# Patient Record
Sex: MALE | Race: WHITE | NOT HISPANIC OR LATINO | Employment: OTHER | URBAN - METROPOLITAN AREA
[De-identification: names, ages, dates, MRNs, and addresses within clinical notes are randomized per-mention and may not be internally consistent; named-entity substitution may affect disease eponyms.]

---

## 2023-03-25 DIAGNOSIS — E11.9 TYPE 2 DIABETES MELLITUS WITHOUT COMPLICATIONS (MULTI): ICD-10-CM

## 2023-03-27 RX ORDER — BLOOD-GLUCOSE METER
EACH MISCELLANEOUS
Qty: 100 STRIP | Refills: 3 | Status: SHIPPED | OUTPATIENT
Start: 2023-03-27 | End: 2024-03-07 | Stop reason: WASHOUT

## 2023-04-27 ENCOUNTER — OFFICE VISIT (OUTPATIENT)
Dept: PRIMARY CARE | Facility: CLINIC | Age: 78
End: 2023-04-27
Payer: MEDICARE

## 2023-04-27 VITALS
DIASTOLIC BLOOD PRESSURE: 70 MMHG | HEART RATE: 51 BPM | SYSTOLIC BLOOD PRESSURE: 138 MMHG | HEIGHT: 73 IN | OXYGEN SATURATION: 96 % | WEIGHT: 206 LBS | TEMPERATURE: 97.2 F | BODY MASS INDEX: 27.3 KG/M2

## 2023-04-27 DIAGNOSIS — D45 POLYCYTHEMIA VERA (MULTI): ICD-10-CM

## 2023-04-27 DIAGNOSIS — I45.2 BIFASCICULAR BLOCK: ICD-10-CM

## 2023-04-27 DIAGNOSIS — I42.8 NONISCHEMIC CARDIOMYOPATHY (MULTI): ICD-10-CM

## 2023-04-27 DIAGNOSIS — I77.810 AORTIC ROOT DILATATION (CMS-HCC): ICD-10-CM

## 2023-04-27 DIAGNOSIS — I50.22 CHRONIC SYSTOLIC HEART FAILURE (MULTI): ICD-10-CM

## 2023-04-27 DIAGNOSIS — I10 BENIGN ESSENTIAL HYPERTENSION: ICD-10-CM

## 2023-04-27 DIAGNOSIS — Z00.00 MEDICARE ANNUAL WELLNESS VISIT, SUBSEQUENT: Primary | ICD-10-CM

## 2023-04-27 DIAGNOSIS — E11.9 CONTROLLED TYPE 2 DIABETES MELLITUS WITHOUT COMPLICATION, WITHOUT LONG-TERM CURRENT USE OF INSULIN (MULTI): ICD-10-CM

## 2023-04-27 DIAGNOSIS — G47.33 OBSTRUCTIVE SLEEP APNEA SYNDROME: ICD-10-CM

## 2023-04-27 DIAGNOSIS — I45.10 RIGHT BUNDLE BRANCH BLOCK (RBBB) DETERMINED BY ELECTROCARDIOGRAPHY: ICD-10-CM

## 2023-04-27 DIAGNOSIS — Z23 ENCOUNTER FOR IMMUNIZATION: ICD-10-CM

## 2023-04-27 PROBLEM — E83.51 HYPOCALCEMIA: Status: ACTIVE | Noted: 2023-04-27

## 2023-04-27 PROBLEM — I44.0 FIRST DEGREE HEART BLOCK: Status: ACTIVE | Noted: 2023-04-27

## 2023-04-27 LAB
POC ALBUMIN /CREATININE RATIO MANUALLY ENTERED: <30 UG/MG CREAT
POC HEMOGLOBIN A1C: 6.3 % (ref 4.2–6.5)
POC URINE ALBUMIN: 10 MG/L
POC URINE CREATININE: 200 MG/DL

## 2023-04-27 PROCEDURE — 1170F FXNL STATUS ASSESSED: CPT | Performed by: FAMILY MEDICINE

## 2023-04-27 PROCEDURE — 1159F MED LIST DOCD IN RCRD: CPT | Performed by: FAMILY MEDICINE

## 2023-04-27 PROCEDURE — 82044 UR ALBUMIN SEMIQUANTITATIVE: CPT | Performed by: FAMILY MEDICINE

## 2023-04-27 PROCEDURE — 90677 PCV20 VACCINE IM: CPT | Performed by: FAMILY MEDICINE

## 2023-04-27 PROCEDURE — 99214 OFFICE O/P EST MOD 30 MIN: CPT | Performed by: FAMILY MEDICINE

## 2023-04-27 PROCEDURE — 3075F SYST BP GE 130 - 139MM HG: CPT | Performed by: FAMILY MEDICINE

## 2023-04-27 PROCEDURE — 1036F TOBACCO NON-USER: CPT | Performed by: FAMILY MEDICINE

## 2023-04-27 PROCEDURE — 3078F DIAST BP <80 MM HG: CPT | Performed by: FAMILY MEDICINE

## 2023-04-27 PROCEDURE — 83036 HEMOGLOBIN GLYCOSYLATED A1C: CPT | Performed by: FAMILY MEDICINE

## 2023-04-27 PROCEDURE — 1160F RVW MEDS BY RX/DR IN RCRD: CPT | Performed by: FAMILY MEDICINE

## 2023-04-27 PROCEDURE — G0009 ADMIN PNEUMOCOCCAL VACCINE: HCPCS | Performed by: FAMILY MEDICINE

## 2023-04-27 RX ORDER — CHLORTHALIDONE 25 MG/1
25 TABLET ORAL DAILY
COMMUNITY
End: 2023-08-08

## 2023-04-27 RX ORDER — LANOLIN ALCOHOL/MO/W.PET/CERES
1 CREAM (GRAM) TOPICAL DAILY
COMMUNITY
Start: 2021-09-14

## 2023-04-27 RX ORDER — LOSARTAN POTASSIUM 25 MG/1
25 TABLET ORAL DAILY
COMMUNITY
End: 2023-10-04

## 2023-04-27 RX ORDER — METFORMIN HYDROCHLORIDE 500 MG/1
1000 TABLET, EXTENDED RELEASE ORAL 2 TIMES DAILY
COMMUNITY
End: 2023-07-14

## 2023-04-27 ASSESSMENT — ACTIVITIES OF DAILY LIVING (ADL)
TAKING_MEDICATION: INDEPENDENT
DOING_HOUSEWORK: INDEPENDENT
DRESSING: INDEPENDENT
BATHING: INDEPENDENT
MANAGING_FINANCES: INDEPENDENT
GROCERY_SHOPPING: INDEPENDENT

## 2023-04-27 ASSESSMENT — PATIENT HEALTH QUESTIONNAIRE - PHQ9
1. LITTLE INTEREST OR PLEASURE IN DOING THINGS: NOT AT ALL
2. FEELING DOWN, DEPRESSED OR HOPELESS: NOT AT ALL
SUM OF ALL RESPONSES TO PHQ9 QUESTIONS 1 AND 2: 0

## 2023-04-27 NOTE — PROGRESS NOTES
Subjective   Reason for Visit: Domo Kelley is an 77 y.o. male here for a Medicare Wellness visit.     HPI  Reviewed Chronic illnesses    Patient Care Team:  Chris Kelsey DO as PCP - General  [facility] Kel[facility]     Review of Systems  No other complaints  Objective   Vitals:  There were no vitals taken for this visit.      Physical Exam  General: Patient is alert and oriented ×3 and appears in no acute distress. No respiratory distress.    Head: Atraumatic normocephalic.    Eyes: EOMI, PERRLA      Ears: Canals patent without any irritation, tympanic membranes without inflammation, no swelling, normal light reflex.    Nose: Nares patent. Turbinates are not swollen. No discharge.    Mouth: Normal mucosa. Moist. No erythema, exudates, tonsillar enlargement.    Neck: Normal range of motion, no masses.  Thyroid is palpable and normal in size without any nodules. No anterior cervical or posterior cervical adenopathy.    Heart: Regular rate and rhythm, no murmurs clicks or gallops    Lungs: Clear to auscultation bilaterally without any rhonchi rales or wheezing, lung sounds heard throughout all lung fields    Abdomen: Soft, nontender, no rigidity, rebound, guarding or organomegaly. Bowel sounds ×4 quadrants.    Musculoskeletal: Normal range of motion, strength is grossly intact in the proximal distal muscles of the upper and lower extremities bilaterally, deep tendon reflexes +2 out of 4 and symmetric bilaterally at the patella, Achilles, biceps, triceps, sensation intact.    Nerves: Cranial nerves II through XII appear grossly intact and without deficit    Skin: Intact, dry, no rashes or erythema    Psych: Normal affect.  Assessment/Plan   Problem List Items Addressed This Visit    None  1. Polycythemia   Initially diagnosed 2015 and received treatment through [facility]   with therapeutic phlebotomies. Transferred care closer to home at [facility].   Current  treatment- therapeutic phlebotomies every 2 months to maintain goal   hematocrit <45%. Hematocrit today- 47.8%      diabetes type 2  - Controlled with HBA1c 6.3 April 27, 2023  -metformin ER 1000 mg twice daily.   -Patient followed up with [facility] December 2022  -Patient was referred to dietary counseling  - Microalbumin done 12/2022 normal  - exercise at least 15 to 20 minutes a day 4 days a week  - Discuss statin at next visit  - Continue Berberine 500 mg 3 x day  -Repeat labs in 6 months    Essential hypertension  -Controlled at 138/70    Obstructive sleep apnea  -Controlled on CPAP patient uses the CPAP for greater than 4 hours a night and uses it every night. Notices relief when using the CPAP.    Vitamin D deficiency  -vitamin D 50,000 IUs once a week and he should take this with a meal    -Receiving phlebotomy every 2 months. Dr. Moctezuma hematologist. DR CHRISTOPHER following    Noniscemic Cardiomyopathy/aortic root dilation/chronic systolic heart failure  - Left ventricular systolic function is normal with a 60% estimated ejection fraction. the left ventricle has no regional variations. This was seen on echocardiogram June 23, 2022  - Stable and followed by Dr Rios    Squamous cell carcinoma- Followed by Dr Sorenson Dermatology    Follow up in 6 months

## 2023-07-14 DIAGNOSIS — E11.9 TYPE 2 DIABETES MELLITUS WITHOUT COMPLICATIONS (MULTI): ICD-10-CM

## 2023-07-14 RX ORDER — METFORMIN HYDROCHLORIDE 500 MG/1
TABLET, EXTENDED RELEASE ORAL
Qty: 360 TABLET | Refills: 3 | Status: SHIPPED | OUTPATIENT
Start: 2023-07-14

## 2023-07-20 ENCOUNTER — TELEPHONE (OUTPATIENT)
Dept: PRIMARY CARE | Facility: CLINIC | Age: 78
End: 2023-07-20
Payer: MEDICARE

## 2023-07-20 DIAGNOSIS — H90.3 SENSORINEURAL HEARING LOSS (SNHL) OF BOTH EARS: Primary | ICD-10-CM

## 2023-08-08 DIAGNOSIS — I10 ESSENTIAL (PRIMARY) HYPERTENSION: ICD-10-CM

## 2023-08-08 RX ORDER — CHLORTHALIDONE 25 MG/1
25 TABLET ORAL DAILY
Qty: 90 TABLET | Refills: 3 | Status: SHIPPED | OUTPATIENT
Start: 2023-08-08

## 2023-09-26 DIAGNOSIS — I10 ESSENTIAL (PRIMARY) HYPERTENSION: ICD-10-CM

## 2023-10-04 RX ORDER — LOSARTAN POTASSIUM 25 MG/1
25 TABLET ORAL DAILY
Qty: 90 TABLET | Refills: 3 | Status: SHIPPED | OUTPATIENT
Start: 2023-10-04

## 2023-10-05 ENCOUNTER — APPOINTMENT (OUTPATIENT)
Dept: AUDIOLOGY | Facility: HOSPITAL | Age: 78
End: 2023-10-05
Payer: MEDICARE

## 2023-10-12 ENCOUNTER — CLINICAL SUPPORT (OUTPATIENT)
Dept: AUDIOLOGY | Facility: HOSPITAL | Age: 78
End: 2023-10-12
Payer: MEDICARE

## 2023-10-12 DIAGNOSIS — H90.3 SENSORINEURAL HEARING LOSS, BILATERAL: Primary | ICD-10-CM

## 2023-10-12 PROBLEM — R53.83 MALAISE AND FATIGUE: Status: ACTIVE | Noted: 2023-10-12

## 2023-10-12 PROBLEM — R53.83 FATIGUE: Status: ACTIVE | Noted: 2023-10-12

## 2023-10-12 PROBLEM — D75.1 SECONDARY POLYCYTHEMIA: Status: ACTIVE | Noted: 2017-11-09

## 2023-10-12 PROBLEM — R00.1 SINUS BRADYCARDIA: Status: ACTIVE | Noted: 2023-10-12

## 2023-10-12 PROBLEM — R53.81 MALAISE AND FATIGUE: Status: ACTIVE | Noted: 2023-10-12

## 2023-10-12 RX ORDER — LISINOPRIL 5 MG/1
1 TABLET ORAL DAILY
COMMUNITY
Start: 2021-06-21 | End: 2023-10-30 | Stop reason: SINTOL

## 2023-10-12 RX ORDER — HYDROCHLOROTHIAZIDE 25 MG/1
25 TABLET ORAL DAILY
COMMUNITY
Start: 2015-11-25 | End: 2023-10-30 | Stop reason: SINTOL

## 2023-10-12 ASSESSMENT — PAIN SCALES - GENERAL: PAINLEVEL_OUTOF10: 0 - NO PAIN

## 2023-10-12 ASSESSMENT — PAIN - FUNCTIONAL ASSESSMENT: PAIN_FUNCTIONAL_ASSESSMENT: 0-10

## 2023-10-12 NOTE — PROGRESS NOTES
"AUDIOLOGY HEARING AID CHECK, FIRST FOLLOW-UP      Name:  Domo Kelley  :  1945  Age:  77 y.o.  Date of Appointment:  10/12/2023     History:  Reason for visit:  Mr. Kelley is seen today for first follow-up visit after new hearing aid fitting.  Patient reports that he is hearing better with the new hearing aids and they are \"easy to wear.\"  He is worried that he has knocked the right hearing aid off of the ear a few times, usually when working outside or removing hearing protection devices.      Current hearing aids:  Hearing Aids:  Phonak Audeo L50R  Right # [id number]    Left # [id number]  Speakers: #1M right, #2M left   Domes: medium vented    Purchased: 2023  Repair/loss warranty ends: 2026  Last hearing evaluation: 2023    Progress:  No reported problems with insertion and removal of hearing aids.   The fit of the hearing aids is good in the office today.  If he continues to have problems with accidentally knocking the aids off, we could consider adding custom molds.    Adequate battery life was reported.    Patient is able to use controls as needed.    No problems with telephone use were reported.  He uses a speaker phone successfully for now.  I gave him information about syncing his hearing aids to his phone and how to download and use the VivaSmart izzy.    No soreness or other comfort problems were reported.  He has had some mild itching that seems to be getting better.  He should contact his physician if itching persists or worsens.    There are no concerns about appropriate care and maintenance of hearing aids.  He is cleaning the aids at least a few times per week.      I adjusted the hearing aids to 100% adaptation levels bilaterally.  He reports improvement / reduction in the hollow/echo sounds bilaterally.      Aided soundfield testing indicated appropriate functional gain ( 20-30 dBHL from 500-4000Hz) bilaterally.    Aided Right:  Speech Reception Threshold = 30 dBHL  Word " discrimination CNC list 2 , 92 % at 55dBHL  Aided Left:  Speech Reception Threshold = 30 dBHL  Word discrimination CNC list 2 , 88 % at 55dBHL      Per data logging, this patient wears the hearing aids 12 hours / day on average, 80 % in calm environments, 10 % speech in noise, 10 % comfort in noise.      IMPRESSIONS:  Patient is handling the new hearing aids well at this stage in the adaptation process.       RECOMMENDATIONS:  -Daily use of hearing aids.  -Daily maintenance to be performed at home.   -Follow-up hearing aid check in 2-3 months or as needed    PATIENT EDUCATION:   Discussed above information with Domo Kelley.  Questions were addressed and the patient was encouraged to contact our department should concerns arise.    Time:  08:23 to 09:03      LORRI Gonzalez, [facility]  Licensed Audiologist

## 2023-10-30 ENCOUNTER — OFFICE VISIT (OUTPATIENT)
Dept: PRIMARY CARE | Facility: CLINIC | Age: 78
End: 2023-10-30
Payer: MEDICARE

## 2023-10-30 VITALS
SYSTOLIC BLOOD PRESSURE: 138 MMHG | HEART RATE: 73 BPM | TEMPERATURE: 97.1 F | OXYGEN SATURATION: 97 % | DIASTOLIC BLOOD PRESSURE: 68 MMHG | WEIGHT: 208 LBS | BODY MASS INDEX: 27.57 KG/M2 | HEIGHT: 73 IN

## 2023-10-30 DIAGNOSIS — D45 POLYCYTHEMIA VERA (MULTI): ICD-10-CM

## 2023-10-30 DIAGNOSIS — I45.10 RIGHT BUNDLE BRANCH BLOCK (RBBB) DETERMINED BY ELECTROCARDIOGRAPHY: ICD-10-CM

## 2023-10-30 DIAGNOSIS — E11.9 CONTROLLED TYPE 2 DIABETES MELLITUS WITHOUT COMPLICATION, WITHOUT LONG-TERM CURRENT USE OF INSULIN (MULTI): Primary | ICD-10-CM

## 2023-10-30 DIAGNOSIS — I10 BENIGN ESSENTIAL HYPERTENSION: ICD-10-CM

## 2023-10-30 DIAGNOSIS — I45.2 BIFASCICULAR BLOCK: ICD-10-CM

## 2023-10-30 DIAGNOSIS — I77.810 AORTIC ROOT DILATATION (CMS-HCC): ICD-10-CM

## 2023-10-30 DIAGNOSIS — G47.33 OBSTRUCTIVE SLEEP APNEA SYNDROME: ICD-10-CM

## 2023-10-30 DIAGNOSIS — J30.9 ALLERGIC RHINITIS, UNSPECIFIED SEASONALITY, UNSPECIFIED TRIGGER: ICD-10-CM

## 2023-10-30 DIAGNOSIS — I42.8 NONISCHEMIC CARDIOMYOPATHY (MULTI): ICD-10-CM

## 2023-10-30 LAB
POC ALBUMIN /CREATININE RATIO MANUALLY ENTERED: <30 UG/MG CREAT
POC HEMOGLOBIN A1C: 6.8 % (ref 4.2–6.5)
POC URINE ALBUMIN: 30 MG/L
POC URINE CREATININE: 300 MG/DL

## 2023-10-30 PROCEDURE — 3078F DIAST BP <80 MM HG: CPT | Performed by: FAMILY MEDICINE

## 2023-10-30 PROCEDURE — 1159F MED LIST DOCD IN RCRD: CPT | Performed by: FAMILY MEDICINE

## 2023-10-30 PROCEDURE — 3075F SYST BP GE 130 - 139MM HG: CPT | Performed by: FAMILY MEDICINE

## 2023-10-30 PROCEDURE — 82044 UR ALBUMIN SEMIQUANTITATIVE: CPT | Performed by: FAMILY MEDICINE

## 2023-10-30 PROCEDURE — 1160F RVW MEDS BY RX/DR IN RCRD: CPT | Performed by: FAMILY MEDICINE

## 2023-10-30 PROCEDURE — 1036F TOBACCO NON-USER: CPT | Performed by: FAMILY MEDICINE

## 2023-10-30 PROCEDURE — 1126F AMNT PAIN NOTED NONE PRSNT: CPT | Performed by: FAMILY MEDICINE

## 2023-10-30 PROCEDURE — 83036 HEMOGLOBIN GLYCOSYLATED A1C: CPT | Performed by: FAMILY MEDICINE

## 2023-10-30 PROCEDURE — 99214 OFFICE O/P EST MOD 30 MIN: CPT | Performed by: FAMILY MEDICINE

## 2023-10-30 RX ORDER — FLUTICASONE PROPIONATE 50 MCG
1 SPRAY, SUSPENSION (ML) NASAL DAILY
Qty: 16 G | Refills: 11 | Status: SHIPPED | OUTPATIENT
Start: 2023-10-30 | End: 2024-05-16 | Stop reason: HOSPADM

## 2023-10-30 ASSESSMENT — PATIENT HEALTH QUESTIONNAIRE - PHQ9
2. FEELING DOWN, DEPRESSED OR HOPELESS: NOT AT ALL
SUM OF ALL RESPONSES TO PHQ9 QUESTIONS 1 AND 2: 0
1. LITTLE INTEREST OR PLEASURE IN DOING THINGS: NOT AT ALL

## 2023-10-30 NOTE — PROGRESS NOTES
"Subjective   Reason for Visit: Domo Kelley is an 77 y.o. male here for follow-up on chronic medical conditions  Hemoglobin A1c and microalbumin needed  HPI  Reviewed Chronic illnesses    Patient Care Team:  Chris Kelsey DO as PCP - General  Carlos Rios MD as Consulting Physician (Cardiology)  Dominga Sorenson MD (Dermatology)  Rasta Sequeira MD as Consulting Physician (Hematology and Oncology)     Review of Systems  Patient is complaining of some drainage down the back of his throat that is constant.  Does not occur with any particular season.  Has not tried anything for it.  No other complaints.  12 systems were reviewed  Objective   Vitals:  /68   Pulse 73   Temp 36.2 °C (97.1 °F)   Ht 1.854 m (6' 1\")   Wt 94.3 kg (208 lb)   SpO2 97%   BMI 27.44 kg/m²       Physical Exam  General: Patient is alert and oriented ×3 and appears in no acute distress. No respiratory distress.    Head: Atraumatic normocephalic.    Eyes: EOMI, PERRLA      Ears: Canals patent without any irritation, tympanic membranes without inflammation, no swelling, normal light reflex.    Nose: Nares patent. Turbinates are swollen. No discharge.    Mouth: Normal mucosa. Moist. No erythema, exudates, tonsillar enlargement.    Neck: Normal range of motion, no masses.  Thyroid is palpable and normal in size without any nodules. No anterior cervical or posterior cervical adenopathy.    Heart: Regular rate and rhythm, no murmurs clicks or gallops    Lungs: Clear to auscultation bilaterally without any rhonchi rales or wheezing, lung sounds heard throughout all lung fields    Abdomen: Soft, nontender, no rigidity, rebound, guarding or organomegaly. Bowel sounds ×4 quadrants.    Musculoskeletal: Normal range of motion, strength is grossly intact in the proximal distal muscles of the upper and lower extremities bilaterally, deep tendon reflexes +2 out of 4 and symmetric bilaterally at the patella, Achilles, biceps, triceps, sensation " intact.    Nerves: Cranial nerves II through XII appear grossly intact and without deficit    Skin: Intact, dry, no rashes or erythema    Psych: Normal affect.  Assessment/Plan   Problem List Items Addressed This Visit       Aortic root dilatation (CMS/HCC)    Benign essential hypertension    Bifascicular block    Controlled type 2 diabetes mellitus without complication, without long-term current use of insulin (CMS/HCC) - Primary    Nonischemic cardiomyopathy (CMS/HCC)    Obstructive sleep apnea syndrome    Polycythemia vera (CMS/HCC)    Overview     POLYCYTHEMIA VERA - (D45)         Right bundle branch block (RBBB) determined by electrocardiography     Other Visit Diagnoses       Allergic rhinitis, unspecified seasonality, unspecified trigger        Relevant Medications    fluticasone (Flonase) 50 mcg/actuation nasal spray        1. Polycythemia   Initially diagnosed 2015 and received treatment through [facility]   with therapeutic phlebotomies. Transferred care closer to home at [facility].   Current treatment- therapeutic phlebotomies every 2 months to maintain goal   hematocrit <45%. Hematocrit today- 47.8%      diabetes type 2  - Controlled with HBA1c 6.8 10/2023  -metformin ER 1000 mg twice daily.   -Patient followed up with [facility] December 2022  -Patient was referred to dietary counseling  - Microalbumin done 10/30/2023 normal  - exercise at least 15 to 20 minutes a day 4 days a week  - Discuss statin at next visit  - Continue Berberine 500 mg 3 x day  -Repeat labs in 6 months    Essential hypertension  -Controlled at 138/68    Obstructive sleep apnea  -Controlled on CPAP patient uses the CPAP for greater than 4 hours a night and uses it every night. Notices relief when using the CPAP.    Vitamin D deficiency         Polycythemia vera  -Receiving phlebotomy every 2 months. Dr. Moctezuma hematologist. DR CHRISTOPHER following    Noniscemic Cardiomyopathy/aortic root dilation/chronic  systolic heart failure  - CONCLUSIONS: of \[facility] [place] [facility]\"" 6/2023  1. Left ventricular systolic function is normal with a 60-65% estimated ejection fraction.  2. There is moderate dilatation of the aortic root.  - Stable and followed by Dr Rios    Squamous cell carcinoma- Followed by Dr Sorenson Dermatology    Allergic rhinitis  - Started salt water nasal rinses  - Instructed to use Flonase 1 spray per nostril after using salt water nasal rinses      Follow up in 6 months

## 2023-11-07 DIAGNOSIS — R53.82 CHRONIC FATIGUE: ICD-10-CM

## 2023-11-07 DIAGNOSIS — D50.9 IRON DEFICIENCY ANEMIA, UNSPECIFIED IRON DEFICIENCY ANEMIA TYPE: ICD-10-CM

## 2023-11-07 DIAGNOSIS — D75.1 SECONDARY POLYCYTHEMIA: ICD-10-CM

## 2023-11-10 DIAGNOSIS — D45 POLYCYTHEMIA VERA (MULTI): Primary | ICD-10-CM

## 2023-11-10 RX ORDER — DIPHENHYDRAMINE HYDROCHLORIDE 50 MG/ML
50 INJECTION INTRAMUSCULAR; INTRAVENOUS AS NEEDED
Status: CANCELLED | OUTPATIENT
Start: 2023-11-14

## 2023-11-10 RX ORDER — HEPARIN 100 UNIT/ML
500 SYRINGE INTRAVENOUS AS NEEDED
Status: CANCELLED | OUTPATIENT
Start: 2023-11-14

## 2023-11-10 RX ORDER — ALBUTEROL SULFATE 0.83 MG/ML
3 SOLUTION RESPIRATORY (INHALATION) AS NEEDED
Status: CANCELLED | OUTPATIENT
Start: 2023-11-14

## 2023-11-10 RX ORDER — FAMOTIDINE 10 MG/ML
20 INJECTION INTRAVENOUS ONCE AS NEEDED
Status: CANCELLED | OUTPATIENT
Start: 2023-11-14

## 2023-11-10 RX ORDER — EPINEPHRINE 0.3 MG/.3ML
0.3 INJECTION SUBCUTANEOUS EVERY 5 MIN PRN
Status: CANCELLED | OUTPATIENT
Start: 2023-11-14

## 2023-11-10 RX ORDER — HEPARIN SODIUM,PORCINE/PF 10 UNIT/ML
50 SYRINGE (ML) INTRAVENOUS AS NEEDED
Status: CANCELLED | OUTPATIENT
Start: 2023-11-14

## 2023-11-14 ENCOUNTER — APPOINTMENT (OUTPATIENT)
Dept: LAB | Facility: HOSPITAL | Age: 78
End: 2023-11-14
Payer: MEDICARE

## 2023-11-14 ENCOUNTER — INFUSION (OUTPATIENT)
Dept: HEMATOLOGY/ONCOLOGY | Facility: CLINIC | Age: 78
End: 2023-11-14
Payer: MEDICARE

## 2023-11-14 ENCOUNTER — OFFICE VISIT (OUTPATIENT)
Dept: HEMATOLOGY/ONCOLOGY | Facility: CLINIC | Age: 78
End: 2023-11-14
Payer: MEDICARE

## 2023-11-14 VITALS
SYSTOLIC BLOOD PRESSURE: 144 MMHG | TEMPERATURE: 97.7 F | DIASTOLIC BLOOD PRESSURE: 83 MMHG | RESPIRATION RATE: 16 BRPM | HEIGHT: 72 IN | HEART RATE: 68 BPM | BODY MASS INDEX: 28.5 KG/M2 | WEIGHT: 210.43 LBS | OXYGEN SATURATION: 97 %

## 2023-11-14 VITALS — SYSTOLIC BLOOD PRESSURE: 132 MMHG | HEART RATE: 64 BPM | DIASTOLIC BLOOD PRESSURE: 81 MMHG

## 2023-11-14 DIAGNOSIS — D45 POLYCYTHEMIA VERA (MULTI): ICD-10-CM

## 2023-11-14 DIAGNOSIS — D45 POLYCYTHEMIA VERA (MULTI): Primary | ICD-10-CM

## 2023-11-14 DIAGNOSIS — D75.1 SECONDARY POLYCYTHEMIA: ICD-10-CM

## 2023-11-14 DIAGNOSIS — D50.9 IRON DEFICIENCY ANEMIA, UNSPECIFIED IRON DEFICIENCY ANEMIA TYPE: ICD-10-CM

## 2023-11-14 DIAGNOSIS — R53.82 CHRONIC FATIGUE: ICD-10-CM

## 2023-11-14 LAB
ALBUMIN SERPL BCP-MCNC: 4.6 G/DL (ref 3.4–5)
ALP SERPL-CCNC: 70 U/L (ref 33–136)
ALT SERPL W P-5'-P-CCNC: 17 U/L (ref 10–52)
ANION GAP SERPL CALC-SCNC: 10 MMOL/L (ref 10–20)
AST SERPL W P-5'-P-CCNC: 16 U/L (ref 9–39)
BASOPHILS # BLD AUTO: 0.02 X10*3/UL (ref 0–0.1)
BASOPHILS NFR BLD AUTO: 0.3 %
BILIRUB SERPL-MCNC: 0.7 MG/DL (ref 0–1.2)
BUN SERPL-MCNC: 17 MG/DL (ref 6–23)
CALCIUM SERPL-MCNC: 9.5 MG/DL (ref 8.6–10.3)
CHLORIDE SERPL-SCNC: 98 MMOL/L (ref 98–107)
CO2 SERPL-SCNC: 31 MMOL/L (ref 21–32)
CREAT SERPL-MCNC: 0.94 MG/DL (ref 0.5–1.3)
EOSINOPHIL # BLD AUTO: 0.09 X10*3/UL (ref 0–0.4)
EOSINOPHIL NFR BLD AUTO: 1.4 %
ERYTHROCYTE [DISTWIDTH] IN BLOOD BY AUTOMATED COUNT: 13 % (ref 11.5–14.5)
FERRITIN SERPL-MCNC: 20 NG/ML (ref 20–300)
GFR SERPL CREATININE-BSD FRML MDRD: 83 ML/MIN/1.73M*2
GLUCOSE SERPL-MCNC: 229 MG/DL (ref 74–99)
HCT VFR BLD AUTO: 47.9 % (ref 41–52)
HGB BLD-MCNC: 16.3 G/DL (ref 13.5–17.5)
IMM GRANULOCYTES # BLD AUTO: 0.01 X10*3/UL (ref 0–0.5)
IMM GRANULOCYTES NFR BLD AUTO: 0.2 % (ref 0–0.9)
IRON SATN MFR SERPL: 21 % (ref 25–45)
IRON SERPL-MCNC: 91 UG/DL (ref 35–150)
LYMPHOCYTES # BLD AUTO: 2.26 X10*3/UL (ref 0.8–3)
LYMPHOCYTES NFR BLD AUTO: 34.3 %
MCH RBC QN AUTO: 28 PG (ref 26–34)
MCHC RBC AUTO-ENTMCNC: 34 G/DL (ref 32–36)
MCV RBC AUTO: 82 FL (ref 80–100)
MONOCYTES # BLD AUTO: 0.62 X10*3/UL (ref 0.05–0.8)
MONOCYTES NFR BLD AUTO: 9.4 %
NEUTROPHILS # BLD AUTO: 3.59 X10*3/UL (ref 1.6–5.5)
NEUTROPHILS NFR BLD AUTO: 54.4 %
PLATELET # BLD AUTO: 174 X10*3/UL (ref 150–450)
POTASSIUM SERPL-SCNC: 4.1 MMOL/L (ref 3.5–5.3)
PROT SERPL-MCNC: 7.2 G/DL (ref 6.4–8.2)
RBC # BLD AUTO: 5.82 X10*6/UL (ref 4.5–5.9)
SODIUM SERPL-SCNC: 135 MMOL/L (ref 136–145)
TIBC SERPL-MCNC: 426 UG/DL (ref 240–445)
UIBC SERPL-MCNC: 335 UG/DL (ref 110–370)
WBC # BLD AUTO: 6.6 X10*3/UL (ref 4.4–11.3)

## 2023-11-14 PROCEDURE — 1159F MED LIST DOCD IN RCRD: CPT | Performed by: INTERNAL MEDICINE

## 2023-11-14 PROCEDURE — 36415 COLL VENOUS BLD VENIPUNCTURE: CPT | Performed by: INTERNAL MEDICINE

## 2023-11-14 PROCEDURE — 80053 COMPREHEN METABOLIC PANEL: CPT | Performed by: INTERNAL MEDICINE

## 2023-11-14 PROCEDURE — 85025 COMPLETE CBC W/AUTO DIFF WBC: CPT | Performed by: INTERNAL MEDICINE

## 2023-11-14 PROCEDURE — 82728 ASSAY OF FERRITIN: CPT | Performed by: INTERNAL MEDICINE

## 2023-11-14 PROCEDURE — 83540 ASSAY OF IRON: CPT | Performed by: INTERNAL MEDICINE

## 2023-11-14 PROCEDURE — 99213 OFFICE O/P EST LOW 20 MIN: CPT | Mod: PO | Performed by: INTERNAL MEDICINE

## 2023-11-14 PROCEDURE — 99195 PHLEBOTOMY: CPT

## 2023-11-14 PROCEDURE — 3077F SYST BP >= 140 MM HG: CPT | Performed by: INTERNAL MEDICINE

## 2023-11-14 PROCEDURE — 3079F DIAST BP 80-89 MM HG: CPT | Performed by: INTERNAL MEDICINE

## 2023-11-14 PROCEDURE — 1160F RVW MEDS BY RX/DR IN RCRD: CPT | Performed by: INTERNAL MEDICINE

## 2023-11-14 PROCEDURE — 1126F AMNT PAIN NOTED NONE PRSNT: CPT | Performed by: INTERNAL MEDICINE

## 2023-11-14 PROCEDURE — 99213 OFFICE O/P EST LOW 20 MIN: CPT | Performed by: INTERNAL MEDICINE

## 2023-11-14 PROCEDURE — 1036F TOBACCO NON-USER: CPT | Performed by: INTERNAL MEDICINE

## 2023-11-14 PROCEDURE — 83550 IRON BINDING TEST: CPT | Performed by: INTERNAL MEDICINE

## 2023-11-14 RX ORDER — HEPARIN SODIUM,PORCINE/PF 10 UNIT/ML
50 SYRINGE (ML) INTRAVENOUS AS NEEDED
Status: CANCELLED | OUTPATIENT
Start: 2023-11-14

## 2023-11-14 RX ORDER — FAMOTIDINE 10 MG/ML
20 INJECTION INTRAVENOUS ONCE AS NEEDED
Status: DISCONTINUED | OUTPATIENT
Start: 2023-11-14 | End: 2023-11-14 | Stop reason: HOSPADM

## 2023-11-14 RX ORDER — DIPHENHYDRAMINE HYDROCHLORIDE 50 MG/ML
50 INJECTION INTRAMUSCULAR; INTRAVENOUS AS NEEDED
Status: CANCELLED | OUTPATIENT
Start: 2023-11-14

## 2023-11-14 RX ORDER — EPINEPHRINE 0.3 MG/.3ML
0.3 INJECTION SUBCUTANEOUS EVERY 5 MIN PRN
Status: DISCONTINUED | OUTPATIENT
Start: 2023-11-14 | End: 2023-11-14 | Stop reason: HOSPADM

## 2023-11-14 RX ORDER — DIPHENHYDRAMINE HYDROCHLORIDE 50 MG/ML
50 INJECTION INTRAMUSCULAR; INTRAVENOUS AS NEEDED
Status: DISCONTINUED | OUTPATIENT
Start: 2023-11-14 | End: 2023-11-14 | Stop reason: HOSPADM

## 2023-11-14 RX ORDER — EPINEPHRINE 0.3 MG/.3ML
0.3 INJECTION SUBCUTANEOUS EVERY 5 MIN PRN
Status: CANCELLED | OUTPATIENT
Start: 2023-11-14

## 2023-11-14 RX ORDER — HEPARIN 100 UNIT/ML
500 SYRINGE INTRAVENOUS AS NEEDED
Status: CANCELLED | OUTPATIENT
Start: 2023-11-14

## 2023-11-14 RX ORDER — ALBUTEROL SULFATE 0.83 MG/ML
3 SOLUTION RESPIRATORY (INHALATION) AS NEEDED
Status: DISCONTINUED | OUTPATIENT
Start: 2023-11-14 | End: 2023-11-14 | Stop reason: HOSPADM

## 2023-11-14 RX ORDER — ALBUTEROL SULFATE 0.83 MG/ML
3 SOLUTION RESPIRATORY (INHALATION) AS NEEDED
Status: CANCELLED | OUTPATIENT
Start: 2023-11-14

## 2023-11-14 RX ORDER — FAMOTIDINE 10 MG/ML
20 INJECTION INTRAVENOUS ONCE AS NEEDED
Status: CANCELLED | OUTPATIENT
Start: 2023-11-14

## 2023-11-14 ASSESSMENT — PATIENT HEALTH QUESTIONNAIRE - PHQ9
SUM OF ALL RESPONSES TO PHQ9 QUESTIONS 1 AND 2: 0
1. LITTLE INTEREST OR PLEASURE IN DOING THINGS: NOT AT ALL
2. FEELING DOWN, DEPRESSED OR HOPELESS: NOT AT ALL

## 2023-11-14 ASSESSMENT — COLUMBIA-SUICIDE SEVERITY RATING SCALE - C-SSRS
6. HAVE YOU EVER DONE ANYTHING, STARTED TO DO ANYTHING, OR PREPARED TO DO ANYTHING TO END YOUR LIFE?: NO
1. IN THE PAST MONTH, HAVE YOU WISHED YOU WERE DEAD OR WISHED YOU COULD GO TO SLEEP AND NOT WAKE UP?: NO
2. HAVE YOU ACTUALLY HAD ANY THOUGHTS OF KILLING YOURSELF?: NO

## 2023-11-14 ASSESSMENT — PAIN SCALES - GENERAL: PAINLEVEL: 0-NO PAIN

## 2023-11-14 NOTE — PROGRESS NOTES
Patient Visit Information:   Visit Type: Follow Up Visit      History of Present Illness:      ID Statement:    DOMO ROBERTS is a 77 year old Male        Chief Complaint: Polycythemia vera   Interval History:    Domo Roberts presents today for interval follow-up and treatment of polycythemia.     He reports he overall feels well. He has been receiving therapeutic phlebotomies every 2 months and tolerating well with good response. Hematocrit today 47.9.   Sometime patient has a headache, very  active, still working full-time, no night sweats, no fever, no chest pain, no shortness of breath, no abdominal pain, no diarrhea, no arthritis, no itching after hot showers     Past Medical History:  Polycythemia: Initially diagnosed May 2015 and started treatment with therapeutic phlebotomies at [facility] and transferred care closer to home at [facility] in July 2021. Has  been receiving therapeutic phlebotomies to maintain goal hematocrit <45%.      Hypertension, Vitamin D deficiency, Diabetes Mellitus, Non-ischemic cardiomyopathy, JULIETA, AV block, CAD, History of squamous cell carcinoma of skin, Osteoarthritis.      Social History:  He grows wine grapes and turf seed.        Review of Systems:   Review of Systems:    Constitutional: Feeling well, no fatigue or weakness. No fever, chills, night sweats.  Head and neck: No dizziness. Intermittent headaches.    HEENT: No sore throat or sinusitis.  Hearing is normal eyesight is good.  Cardiac: No chest pain or palpitations.  Pulmonary: no cough or shortness of breath.  GI: Appetite is good and weight stable.  No constipation or diarrhea.  No abdominal pain  Genitourinary: No frequency or urgency.  No polyuria or dysuria.  Musculocutaneous: History of osteoarthritis.  Endocrine: No thyroid disease. History of diabetes.   Skin: No rash or itching.  Neuromuscular : no fainting or dizziness.  No history of convulsions.  No tingling or numbness.            Allergies and Intolerances:       Allergies:         No Known Allergies: Drug, Unknown, Active     Outpatient Medication Profile:  * Patient Currently Takes Medications as of 25-Jul-2023 09:56 documented in Structured Notes         metFORMIN HCl - 1000 MG Oral Tablet : Last Dose Taken:  , TAKE 1 TABLET TWICE DAILY., Start Date: 02-Dec-2020         Chlorthalidone 25 MG Oral Tablet: Last Dose Taken:  , TAKE 1 TABLET DAILY.,  Start Date: 01-Aug-2019         losartan 25 mg oral tablet: Last Dose Taken:  , 1 tab(s) orally once  a day         Vitamin B Complex 100 injectable solution: Last Dose Taken:  , 1 cap(s)  orally once a day             Medical History:         Polycythemia vera: ICD-10: D45, Status: Active         Polycythemia vera: ICD-10: D45, Status: Active     Family History: No Family History items are recorded  in the problem list.      Social History:   Social Substance History:  ·  Smoking Status never smoker (1)   ·  Alcohol Use denies   ·  Drug Use denies            Vitals and Measurements:   Vitals: Temp: 35.6  HR: 50  RR: 16  BP: 156/75  SPO2%:   97   Measurements: HT(cm): 183  WT(kg): 93.7  BSA: 2.18   BMI:  27.9   Last 3 Weights & Heights: Date:                           Weight/Scale Type:                    Height:   25-Jul-2023 09:51                93.7  kg                     183  cm  30-May-2023 09:05                94.7  kg                     183  cm  28-Mar-2023 10:50                96.1  kg                     183  cm      Physical Exam:      Constitutional: awake/alert/oriented x3, no distress,   Eyes: PERRL, EOMI, clear sclera   ENMT: mucous membranes moist,   Head/Neck: Neck supple,  thyroid without mass or  tenderness, No JVD, trachea midline, no bruits   Respiratory/Thorax: Patent airways, CTAB, normal  breath sounds   Cardiovascular: Regular, rate and rhythm,   normal  S 1and S 2   Gastrointestinal: Nondistended, soft, non-tender,   no masses palpable, no organomegaly, +BS,    Musculoskeletal: ROM intact, no joint swelling, normal  strength   Extremities: normal extremities, no cyanosis edema,   no clubbing   Neurological: alert and oriented x3,   Lymphatic: No significant lymphadenopathy   Psychological: Appropriate mood and behavior   Skin: Warm and dry, no lesions,         Lab Results:     ·  Results         4.4 - 11.3 x10*3/uL 6.6 6.2 R 6.0 R 5.6 R 5.6 R 5.2 R 8.6 R   RBC  4.50 - 5.90 x10*6/uL 5.82 5.76 R 5.81 R 5.59 R 5.81 R 5.64 R 6.04 High  R   Hemoglobin  13.5 - 17.5 g/dL 16.3 16.3 16.4 16.0 16.3 15.9 16.7   Hematocrit  41.0 - 52.0 % 47.9 46.8 47.1 45.7 47.8 46.2 47.7   MCV  80 - 100 fL 82 81 81 82 82 82 79 Low    MCH  26.0 - 34.0 pg 28.0         MCHC  32.0 - 36.0 g/dL 34.0 34.8 34.8 35.0 34.1 34.4 35.0   RDW  11.5 - 14.5 % 13.0 13.0 12.5 12.6 13.4 13.2 13.4   Platelets  150 - 450 x10*3/uL 174           Assessment and Plan:      Assessment and Plan:   Assessment:    1. Polycythemia  Initially diagnosed 2015 and received treatment through [facility] with therapeutic phlebotomies. Transferred care closer to home at [facility]. Current treatment- therapeutic  phlebotomies every 2 months to maintain goal hematocrit <45%. Hematocrit today- 47.8%     2. Multiple medical conditions including HTN, JULIETA, DM, non-ischemic cardiomyopathy, etc.     Plan:  Patient will receive phlebotomy today for hematocrit 47.9%. Continue treatment  of polycythemia with every 2 month phlebotomies for goal hematocrit <45%. Return in 2 months for follow-up visit   .-Monitor CBC, CMP, iron studies.        Total time =20 minutes. 50% or more of this time was spent in counseling and/or coordination of care including reviewing medical history/radiology/labs, examining patient, formulating outlined plan  with team, and discussing plan with patient/family.  I reviewed patient's chart including but not limited to labs, imaging, surgical/procedure notes, pathology, hospital notes, doctor's notes.

## 2023-11-14 NOTE — PATIENT INSTRUCTIONS
Continue labs and phlebotomies every 2 months for hematocrit greater that 45%.     Follow up with Dr. Sequeira in 2 months.     Lab appointments are no longer scheduled. Please arrive at least 15 minutes before scheduled appointment time for blood work.

## 2023-12-13 ENCOUNTER — TELEPHONE (OUTPATIENT)
Dept: PRIMARY CARE | Facility: CLINIC | Age: 78
End: 2023-12-13

## 2023-12-13 ENCOUNTER — CLINICAL SUPPORT (OUTPATIENT)
Dept: AUDIOLOGY | Facility: HOSPITAL | Age: 78
End: 2023-12-13
Payer: MEDICARE

## 2023-12-13 DIAGNOSIS — H90.3 SENSORINEURAL HEARING LOSS, BILATERAL: Primary | ICD-10-CM

## 2023-12-13 ASSESSMENT — PAIN - FUNCTIONAL ASSESSMENT: PAIN_FUNCTIONAL_ASSESSMENT: 0-10

## 2023-12-13 ASSESSMENT — PAIN SCALES - GENERAL: PAINLEVEL_OUTOF10: 0 - NO PAIN

## 2023-12-13 NOTE — PROGRESS NOTES
"AUDIOLOGY HEARING AID CHECK      Name:  Domo Kelley  :  1945  Age:  78 y.o.  Date of Appointment:  2023     History:  Mr. Kelley is seen today for Hearing Aid Check (Routine check at almost 90 days post new fitting).  He reports that he has noticed a \"popping\", more in the left ear, with improved hearing briefly, then return to more muffled hearing.  He also reported respiratory illness in the last few weeks.  He questions what he is cleaning when he uses the brush over the domes.      Current hearing aids:  Hearing Aids:  Phonak Audeo L50R  Right # [id number]    Left # [id number]  Speakers: #1M right, #2M left   Domes: medium vented    Purchased: 2023  Repair/loss warranty ends: 2026  Last hearing evaluation: 2023    Upon initial examination, the left hearing aid was dead, occluded with cerumen, and the right hearing aid sounded good.      Cleaned and checked both devices.    Replaced filters bilaterally.  Gave extra supplies to patient.   Replaced domes bilaterally.  Gave extra supplies to patient.   Also, gave extra brush and reviewed how to do cleaning / filter changes at home.    Both hearing aids sound and look good on final examination.      Otoscopic observation revealed clear ear canals bilaterally.    Per data logging, this patient wears the hearing aids 14 hours / day on average, 86% in calm environments, 8% speech in noise, 6% comfort in noise.    Automatic adaptations show increasing volume in calm situations, but he reports that he has adjusted to the automatic settings and generally does not touch the volume control.  I left programming as is today.  Will monitor    IMPRESSIONS:  Both hearing aids function appropriately following today's routine maintenance procedures.  The patient also reports the aids sound good.      RECOMMENDATIONS:  -Daily use of hearing aids.  -Daily maintenance to be performed at home.   -Follow-up hearing aid check in 6 months or as " needed  -Consult with his physician if ear popping and hearing changes persist or worsen    PATIENT EDUCATION:   Discussed above information with Mr. Kelley.  Questions were addressed and the patient was encouraged to contact our department should concerns arise.    Time:  08:54 to 09:22    LORRI Gonzalez, [facility]  Licensed Audiologist

## 2024-01-10 ENCOUNTER — APPOINTMENT (OUTPATIENT)
Dept: HEMATOLOGY/ONCOLOGY | Facility: CLINIC | Age: 79
End: 2024-01-10
Payer: MEDICARE

## 2024-01-10 ENCOUNTER — INFUSION (OUTPATIENT)
Dept: HEMATOLOGY/ONCOLOGY | Facility: CLINIC | Age: 79
End: 2024-01-10
Payer: MEDICARE

## 2024-01-10 ENCOUNTER — OFFICE VISIT (OUTPATIENT)
Dept: HEMATOLOGY/ONCOLOGY | Facility: CLINIC | Age: 79
End: 2024-01-10
Payer: MEDICARE

## 2024-01-10 VITALS
BODY MASS INDEX: 28.44 KG/M2 | RESPIRATION RATE: 16 BRPM | HEIGHT: 72 IN | TEMPERATURE: 98.1 F | OXYGEN SATURATION: 96 % | SYSTOLIC BLOOD PRESSURE: 147 MMHG | DIASTOLIC BLOOD PRESSURE: 84 MMHG | WEIGHT: 209.99 LBS | HEART RATE: 68 BPM

## 2024-01-10 VITALS — HEART RATE: 80 BPM | DIASTOLIC BLOOD PRESSURE: 78 MMHG | SYSTOLIC BLOOD PRESSURE: 120 MMHG

## 2024-01-10 DIAGNOSIS — D50.9 IRON DEFICIENCY ANEMIA, UNSPECIFIED IRON DEFICIENCY ANEMIA TYPE: ICD-10-CM

## 2024-01-10 DIAGNOSIS — D75.1 SECONDARY POLYCYTHEMIA: ICD-10-CM

## 2024-01-10 DIAGNOSIS — D45 POLYCYTHEMIA VERA (MULTI): ICD-10-CM

## 2024-01-10 DIAGNOSIS — R53.82 CHRONIC FATIGUE: ICD-10-CM

## 2024-01-10 LAB
ANION GAP SERPL CALC-SCNC: 12 MMOL/L (ref 10–20)
BASOPHILS # BLD AUTO: 0.02 X10*3/UL (ref 0–0.1)
BASOPHILS NFR BLD AUTO: 0.3 %
BUN SERPL-MCNC: 17 MG/DL (ref 6–23)
CALCIUM SERPL-MCNC: 9.7 MG/DL (ref 8.6–10.3)
CHLORIDE SERPL-SCNC: 98 MMOL/L (ref 98–107)
CO2 SERPL-SCNC: 30 MMOL/L (ref 21–32)
CREAT SERPL-MCNC: 0.95 MG/DL (ref 0.5–1.3)
EGFRCR SERPLBLD CKD-EPI 2021: 82 ML/MIN/1.73M*2
EOSINOPHIL # BLD AUTO: 0.1 X10*3/UL (ref 0–0.4)
EOSINOPHIL NFR BLD AUTO: 1.4 %
ERYTHROCYTE [DISTWIDTH] IN BLOOD BY AUTOMATED COUNT: 12.9 % (ref 11.5–14.5)
FERRITIN SERPL-MCNC: 25 NG/ML (ref 20–300)
GLUCOSE SERPL-MCNC: 129 MG/DL (ref 74–99)
HCT VFR BLD AUTO: 49.3 % (ref 41–52)
HGB BLD-MCNC: 16.9 G/DL (ref 13.5–17.5)
IMM GRANULOCYTES # BLD AUTO: 0.01 X10*3/UL (ref 0–0.5)
IMM GRANULOCYTES NFR BLD AUTO: 0.1 % (ref 0–0.9)
IRON SATN MFR SERPL: 18 % (ref 25–45)
IRON SERPL-MCNC: 78 UG/DL (ref 35–150)
LYMPHOCYTES # BLD AUTO: 2.59 X10*3/UL (ref 0.8–3)
LYMPHOCYTES NFR BLD AUTO: 37.5 %
MCH RBC QN AUTO: 27.9 PG (ref 26–34)
MCHC RBC AUTO-ENTMCNC: 34.3 G/DL (ref 32–36)
MCV RBC AUTO: 81 FL (ref 80–100)
MONOCYTES # BLD AUTO: 0.57 X10*3/UL (ref 0.05–0.8)
MONOCYTES NFR BLD AUTO: 8.2 %
NEUTROPHILS # BLD AUTO: 3.62 X10*3/UL (ref 1.6–5.5)
NEUTROPHILS NFR BLD AUTO: 52.5 %
PLATELET # BLD AUTO: 177 X10*3/UL (ref 150–450)
POTASSIUM SERPL-SCNC: 3.9 MMOL/L (ref 3.5–5.3)
RBC # BLD AUTO: 6.06 X10*6/UL (ref 4.5–5.9)
SODIUM SERPL-SCNC: 136 MMOL/L (ref 136–145)
TIBC SERPL-MCNC: 434 UG/DL (ref 240–445)
UIBC SERPL-MCNC: 356 UG/DL (ref 110–370)
WBC # BLD AUTO: 6.9 X10*3/UL (ref 4.4–11.3)

## 2024-01-10 PROCEDURE — 99214 OFFICE O/P EST MOD 30 MIN: CPT | Performed by: INTERNAL MEDICINE

## 2024-01-10 PROCEDURE — 3079F DIAST BP 80-89 MM HG: CPT | Performed by: INTERNAL MEDICINE

## 2024-01-10 PROCEDURE — 1036F TOBACCO NON-USER: CPT | Performed by: INTERNAL MEDICINE

## 2024-01-10 PROCEDURE — 3077F SYST BP >= 140 MM HG: CPT | Performed by: INTERNAL MEDICINE

## 2024-01-10 PROCEDURE — 1126F AMNT PAIN NOTED NONE PRSNT: CPT | Performed by: INTERNAL MEDICINE

## 2024-01-10 PROCEDURE — 1159F MED LIST DOCD IN RCRD: CPT | Performed by: INTERNAL MEDICINE

## 2024-01-10 PROCEDURE — 36415 COLL VENOUS BLD VENIPUNCTURE: CPT | Performed by: INTERNAL MEDICINE

## 2024-01-10 PROCEDURE — 99195 PHLEBOTOMY: CPT

## 2024-01-10 RX ORDER — FAMOTIDINE 10 MG/ML
20 INJECTION INTRAVENOUS ONCE AS NEEDED
Status: CANCELLED | OUTPATIENT
Start: 2024-03-04

## 2024-01-10 RX ORDER — DIPHENHYDRAMINE HYDROCHLORIDE 50 MG/ML
50 INJECTION INTRAMUSCULAR; INTRAVENOUS AS NEEDED
Status: DISCONTINUED | OUTPATIENT
Start: 2024-01-10 | End: 2024-01-10 | Stop reason: HOSPADM

## 2024-01-10 RX ORDER — EPINEPHRINE 0.3 MG/.3ML
0.3 INJECTION SUBCUTANEOUS EVERY 5 MIN PRN
Status: DISCONTINUED | OUTPATIENT
Start: 2024-01-10 | End: 2024-01-10 | Stop reason: HOSPADM

## 2024-01-10 RX ORDER — ALBUTEROL SULFATE 0.83 MG/ML
3 SOLUTION RESPIRATORY (INHALATION) AS NEEDED
Status: CANCELLED | OUTPATIENT
Start: 2024-03-04

## 2024-01-10 RX ORDER — FAMOTIDINE 10 MG/ML
20 INJECTION INTRAVENOUS ONCE AS NEEDED
Status: DISCONTINUED | OUTPATIENT
Start: 2024-01-10 | End: 2024-01-10 | Stop reason: HOSPADM

## 2024-01-10 RX ORDER — ALBUTEROL SULFATE 0.83 MG/ML
3 SOLUTION RESPIRATORY (INHALATION) AS NEEDED
Status: DISCONTINUED | OUTPATIENT
Start: 2024-01-10 | End: 2024-01-10 | Stop reason: HOSPADM

## 2024-01-10 RX ORDER — FAMOTIDINE 10 MG/ML
20 INJECTION INTRAVENOUS ONCE AS NEEDED
Status: CANCELLED | OUTPATIENT
Start: 2024-01-10

## 2024-01-10 RX ORDER — EPINEPHRINE 0.3 MG/.3ML
0.3 INJECTION SUBCUTANEOUS EVERY 5 MIN PRN
Status: CANCELLED | OUTPATIENT
Start: 2024-01-10

## 2024-01-10 RX ORDER — HEPARIN 100 UNIT/ML
500 SYRINGE INTRAVENOUS AS NEEDED
Status: CANCELLED | OUTPATIENT
Start: 2024-01-10

## 2024-01-10 RX ORDER — DIPHENHYDRAMINE HYDROCHLORIDE 50 MG/ML
50 INJECTION INTRAMUSCULAR; INTRAVENOUS AS NEEDED
Status: CANCELLED | OUTPATIENT
Start: 2024-03-04

## 2024-01-10 RX ORDER — HEPARIN SODIUM,PORCINE/PF 10 UNIT/ML
50 SYRINGE (ML) INTRAVENOUS AS NEEDED
Status: CANCELLED | OUTPATIENT
Start: 2024-01-10

## 2024-01-10 RX ORDER — EPINEPHRINE 0.3 MG/.3ML
0.3 INJECTION SUBCUTANEOUS EVERY 5 MIN PRN
Status: CANCELLED | OUTPATIENT
Start: 2024-03-04

## 2024-01-10 RX ORDER — ALBUTEROL SULFATE 0.83 MG/ML
3 SOLUTION RESPIRATORY (INHALATION) AS NEEDED
Status: CANCELLED | OUTPATIENT
Start: 2024-01-10

## 2024-01-10 RX ORDER — DIPHENHYDRAMINE HYDROCHLORIDE 50 MG/ML
50 INJECTION INTRAMUSCULAR; INTRAVENOUS AS NEEDED
Status: CANCELLED | OUTPATIENT
Start: 2024-01-10

## 2024-01-10 ASSESSMENT — PAIN SCALES - GENERAL: PAINLEVEL: 0-NO PAIN

## 2024-01-10 NOTE — PROGRESS NOTES
Pt here for phleb following FUV with Dr hein. Hemato 49.3, qualifies for phleb. Pt tolerated phleb, 500cc removed. Pt declined to stay for 15 min watch period. Pt denied any symptoms prior to leaving office. Pt is aware of plan of care, will return in 2 months for next phleb. Will call with further questions or concerns.

## 2024-01-10 NOTE — PATIENT INSTRUCTIONS
Therapeutic phlebotomy every 2 months. Call if symptoms start earlier and need to have phlebotomy sooner. Office can be reached at [phone].     Follow up with Dr. Sequeira in 4 months.

## 2024-01-10 NOTE — PROGRESS NOTES
Patient Visit Information:   Visit Type: Follow Up Visit      History of Present Illness:      ID Statement:    DOMO ROBERTS is a 77 year old Male        Chief Complaint: Polycythemia vera   Interval History:    Domo Roberts presents today for interval follow-up and treatment of polycythemia.     He reports he overall feels well. He has been receiving therapeutic phlebotomies every 2 months and tolerating well with good response.    Sometime patient has a headache, very  active, still working full-time, no night sweats, no fever, no chest pain, no shortness of breath, no abdominal pain, no diarrhea, no arthritis, no itching after hot showers.  Today hematocrit 49.3.     Past Medical History:  Polycythemia: Initially diagnosed May 2015 and started treatment with therapeutic phlebotomies at [facility] and transferred care closer to home at [facility] in July 2021. Has  been receiving therapeutic phlebotomies to maintain goal hematocrit <45%.      Hypertension, Vitamin D deficiency, Diabetes Mellitus, Non-ischemic cardiomyopathy, JULIETA, AV block, CAD, History of squamous cell carcinoma of skin, Osteoarthritis.      Social History:  He grows wine grapes and turf seed.        Review of Systems:   Review of Systems:    Constitutional: Feeling well, no fatigue or weakness. No fever, chills, night sweats.  Head and neck: No dizziness. Intermittent headaches.    HEENT: No sore throat or sinusitis.  Hearing is normal eyesight is good.  Cardiac: No chest pain or palpitations.  Pulmonary: no cough or shortness of breath.  GI: Appetite is good and weight stable.  No constipation or diarrhea.  No abdominal pain  Genitourinary: No frequency or urgency.  No polyuria or dysuria.  Musculocutaneous: History of osteoarthritis.  Endocrine: No thyroid disease. History of diabetes.   Skin: No rash or itching.  Neuromuscular : no fainting or dizziness.  No history of convulsions.  No tingling or numbness.            Allergies and Intolerances:       Allergies:         No Known Allergies: Drug, Unknown, Active     Outpatient Medication Profile:  * Patient Currently Takes Medications as of 25-Jul-2023 09:56 documented in Structured Notes         metFORMIN HCl - 1000 MG Oral Tablet : Last Dose Taken:  , TAKE 1 TABLET TWICE DAILY., Start Date: 02-Dec-2020         Chlorthalidone 25 MG Oral Tablet: Last Dose Taken:  , TAKE 1 TABLET DAILY.,  Start Date: 01-Aug-2019         losartan 25 mg oral tablet: Last Dose Taken:  , 1 tab(s) orally once  a day         Vitamin B Complex 100 injectable solution: Last Dose Taken:  , 1 cap(s)  orally once a day             Medical History:         Polycythemia vera: ICD-10: D45, Status: Active         Polycythemia vera: ICD-10: D45, Status: Active     Family History: No Family History items are recorded  in the problem list.      Social History:   Social Substance History:  ·  Smoking Status never smoker (1)   ·  Alcohol Use denies   ·  Drug Use denies            Vitals and Measurements:   Vitals: Temp: 35.6  HR: 50  RR: 16  BP: 156/75  SPO2%:   97   Measurements: HT(cm): 183  WT(kg): 93.7  BSA: 2.18   BMI:  27.9   Last 3 Weights & Heights: Date:                           Weight/Scale Type:                    Height:   25-Jul-2023 09:51                93.7  kg                     183  cm  30-May-2023 09:05                94.7  kg                     183  cm  28-Mar-2023 10:50                96.1  kg                     183  cm      Physical Exam:      Constitutional: awake/alert/oriented x3, no distress,   Eyes: PERRL, EOMI, clear sclera   ENMT: mucous membranes moist,   Head/Neck: Neck supple,  thyroid without mass or  tenderness, No JVD, trachea midline, no bruits   Respiratory/Thorax: Patent airways, CTAB, normal  breath sounds   Cardiovascular: Regular, rate and rhythm,   normal  S 1and S 2   Gastrointestinal: Nondistended, soft, non-tender,   no masses palpable, no organomegaly, +BS,    Musculoskeletal: ROM intact, no joint swelling, normal  strength   Extremities: normal extremities, no cyanosis edema,   no clubbing   Neurological: alert and oriented x3,   Lymphatic: No significant lymphadenopathy   Psychological: Appropriate mood and behavior   Skin: Warm and dry, no lesions,         Lab Results:     ·  Results         Units 12:25 1 mo ago 3 mo ago 5 mo ago 7 mo ago 9 mo ago 11 mo ago   WBC  4.4 - 11.3 x10*3/uL 6.9 6.6 6.2 R 6.0 R 5.6 R 5.6 R 5.2 R   RBC  4.50 - 5.90 x10*6/uL 6.06 High  5.82 5.76 R 5.81 R 5.59 R 5.81 R 5.64 R   Hemoglobin  13.5 - 17.5 g/dL 16.9 16.3 16.3 16.4 16.0 16.3 15.9   Hematocrit  41.0 - 52.0 % 49.3 47.9 46.8 47.1 45.7 47.8 46.2   MCV  80 - 100 fL 81 82 81 81 82 82 82   MCH  26.0 - 34.0 pg 27.9 28.0        MCHC  32.0 - 36.0 g/dL 34.3 34.0 34.8 34.8 35.0 34.1 34.4   RDW  11.5 - 14.5 % 12.9 13.0 13.0 12.5 12.6 13.4 13.2   Platelets  150 - 450 x10*3/uL 177 174 171 R 159 R 161 R 174 R 178 R   Neutrophils %  40.0 - 80.0 % 52.5 54.4        Immature Granulocytes %, Automated  0.0 - 0.9 % 0.1 0.2 CM        Comment: Immature Granulocyte Count (IG) includes promyelocytes, myelocytes and metamyelocytes but does not include bands. Percent differential counts (%) should be interpreted in the context of the absolute cell counts (cells/UL).   Lymphocytes %  13.0 - 44.0 % 37.5 34.3        Monocytes %  2.0 - 10.0 % 8.2 9.4        Eosinophils %  0.0 - 6.0 % 1.4 1.4        Basophils %  0.0 - 2.0 % 0.3 0.3        Neutrophils Absolute  1.60 - 5.50 x10*3/uL 3.62 3.59 CM        Comment: Percent differential counts (%) should be interpreted in the context of the absolute cell counts (cells/uL).   Immature Granulocytes Absolute, Automated  0.00 - 0.50 x10*3/uL 0.01 0.01        Lymphocytes Absolute  0.80 - 3.00 x10*3/uL 2.59 2.26        Monocytes Absolute  0.05 - 0.80 x10*3/uL 0.57 0.62        Eosinophils Absolute  0.00 - 0.40 x10*3/uL 0.10 0.09        Basophils Absolute  0.00 - 0.10 x10*3/uL             Assessment and Plan:      Assessment and Plan:   Assessment:    1. Polycythemia  Initially diagnosed 2015 and received treatment through [facility] with therapeutic phlebotomies. Transferred care closer to home at [facility]. Current treatment- therapeutic  phlebotomies every 2 months to maintain goal hematocrit <45%. Hematocrit today- 47.8%     2. Multiple medical conditions including HTN, JULIETA, DM, non-ischemic cardiomyopathy, etc.     Plan:  Patient will receive phlebotomy today for hematocrit 49.3%. Continue treatment  of polycythemia with every 2 month phlebotomies for goal hematocrit <45%. Return in 2 months for follow-up visit   .-Monitor CBC, CMP, iron studies.        Total time =30 minutes. 50% or more of this time was spent in counseling and/or coordination of care including reviewing medical history/radiology/labs, examining patient, formulating outlined plan  with team, and discussing plan with patient/family.  I reviewed patient's chart including but not limited to labs, imaging, surgical/procedure notes, pathology, hospital notes, doctor's notes.

## 2024-01-23 DIAGNOSIS — E11.9 CONTROLLED TYPE 2 DIABETES MELLITUS WITHOUT COMPLICATION, WITHOUT LONG-TERM CURRENT USE OF INSULIN (MULTI): Primary | ICD-10-CM

## 2024-01-29 RX ORDER — BLOOD-GLUCOSE SENSOR
EACH MISCELLANEOUS
Qty: 3 EACH | Refills: 3 | Status: SHIPPED | OUTPATIENT
Start: 2024-01-29 | End: 2024-03-07 | Stop reason: SDUPTHER

## 2024-02-05 ENCOUNTER — CLINICAL SUPPORT (OUTPATIENT)
Dept: AUDIOLOGY | Facility: HOSPITAL | Age: 79
End: 2024-02-05

## 2024-02-05 ENCOUNTER — TELEPHONE (OUTPATIENT)
Dept: CARDIOLOGY | Facility: CLINIC | Age: 79
End: 2024-02-05

## 2024-02-05 ENCOUNTER — HOSPITAL ENCOUNTER (OUTPATIENT)
Dept: CARDIOLOGY | Facility: HOSPITAL | Age: 79
Discharge: HOME | End: 2024-02-05

## 2024-02-05 DIAGNOSIS — I77.810 AORTIC ROOT DILATATION (CMS-HCC): ICD-10-CM

## 2024-02-05 DIAGNOSIS — I10 HTN (HYPERTENSION), BENIGN: ICD-10-CM

## 2024-02-05 DIAGNOSIS — I45.2 BIFASCICULAR BLOCK: ICD-10-CM

## 2024-02-05 DIAGNOSIS — H90.3 SENSORINEURAL HEARING LOSS, BILATERAL: Primary | ICD-10-CM

## 2024-02-05 PROCEDURE — V5299 HEARING SERVICE: HCPCS | Performed by: AUDIOLOGIST

## 2024-02-05 PROCEDURE — 93306 TTE W/DOPPLER COMPLETE: CPT

## 2024-02-05 PROCEDURE — 93306 TTE W/DOPPLER COMPLETE: CPT | Performed by: INTERNAL MEDICINE

## 2024-02-05 NOTE — TELEPHONE ENCOUNTER
2/5/24  1503   Called patient. Patient reports feeling lightheaded and dizzy to the point of passing out. Denies a correlation between symptoms and taking berberine; says he has been taking this for at least one year.     Patient reported wanting an EKG to check out his RBBB and that he had an echo today.    Informed patient RBBB would not account for the symptoms and that depending on the results of the echocardiogram, his appt may be moved up.    Patient verbalized understanding and is agreeable.        ----- Message from Racquel Victoria sent at 2/5/2024  2:44 PM EST -----  Regarding: Symptomatic - please advise  Leland Lemons,     Patient called in today with some concerns about feeling faint and light headed on and off over the past couple months. He had an echo done 2/5/24, following up with Dr. Rios on 2/29 but would like to know if there's anything he can adjust med wise to tide him over until then. Thank you

## 2024-02-05 NOTE — PROGRESS NOTES
AUDIOLOGY HEARING AID CHECK      Name:  Domo Kelley  :  1945  Age:  78 y.o.  Date of Appointment:  2024     History:  Mr. Kelley is seen today for Hearing Aid Check (Left hearing aid is not working).  He noticed that it stopped working a few days ago, no change with cleaning.  He did not try changing the filter or dome, and is unsure of how to do that.      Current hearing aids:  Hearing Aids:  Phonak Audeo L50R  Right # [id number]    Left # [id number]  Speakers: #1M right, #2M left   Domes: medium vented    Purchased: 2023  Repair/loss warranty ends: 2026  Last hearing evaluation: 2023    Upon initial examination, the left hearing aid was dead and the right hearing aid was good    Cleaned and checked both devices.  Demonstrated cleaning and how to change the domes/filters to patient.  Replaced filters bilaterally.  Gave extra supplies to patient.   Replaced domes bilaterally.     There was no change for the left hearing aid with new filter, dome, or other cleaning.  I replaced the left  under warranty, and the left hearing aid now works appropriately.      Both hearing aids sound and look good on final examination.      IMPRESSIONS:  Both hearing aids function appropriately following today's routine maintenance procedures.  The patient also reports the aids sound good.      RECOMMENDATIONS:  -Daily use of hearing aids.  -Daily maintenance to be performed at home.   -Follow-up hearing aid check, scheduled for Pat 10, 2024 at 2pm    PATIENT EDUCATION:   Discussed above information with Mr. Kelley.  Questions were addressed and the patient was encouraged to contact our department should concerns arise.    Time:  13:46 to 14:00      LORRI Gonzalez, CCC-A  Senior Audiologist

## 2024-02-06 LAB
AORTIC VALVE MEAN GRADIENT: 5 MMHG
AORTIC VALVE PEAK VELOCITY: 1.46 M/S
AV PEAK GRADIENT: 8.6 MMHG
AVA (PEAK VEL): 2.02 CM2
AVA (VTI): 1.79 CM2
EJECTION FRACTION APICAL 4 CHAMBER: 73.1
EJECTION FRACTION: 70 %
LEFT ATRIUM VOLUME AREA LENGTH INDEX BSA: 17.4 ML/M2
LEFT VENTRICLE INTERNAL DIMENSION DIASTOLE: 4.33 CM (ref 3.5–6)
LEFT VENTRICULAR OUTFLOW TRACT DIAMETER: 1.97 CM
MITRAL VALVE E/A RATIO: 0.78
MITRAL VALVE E/E' RATIO: 8.12
RIGHT VENTRICLE FREE WALL PEAK S': 13.35 CM/S
TRICUSPID ANNULAR PLANE SYSTOLIC EXCURSION: 2.6 CM

## 2024-02-22 RX ORDER — EPINEPHRINE 0.3 MG/.3ML
0.3 INJECTION SUBCUTANEOUS EVERY 5 MIN PRN
Status: CANCELLED | OUTPATIENT
Start: 2024-03-11

## 2024-02-22 RX ORDER — FAMOTIDINE 10 MG/ML
20 INJECTION INTRAVENOUS ONCE AS NEEDED
Status: CANCELLED | OUTPATIENT
Start: 2024-03-11

## 2024-02-22 RX ORDER — ALBUTEROL SULFATE 0.83 MG/ML
3 SOLUTION RESPIRATORY (INHALATION) AS NEEDED
Status: CANCELLED | OUTPATIENT
Start: 2024-03-11

## 2024-02-22 RX ORDER — DIPHENHYDRAMINE HYDROCHLORIDE 50 MG/ML
50 INJECTION INTRAMUSCULAR; INTRAVENOUS AS NEEDED
Status: CANCELLED | OUTPATIENT
Start: 2024-03-11

## 2024-02-24 RX ORDER — LISINOPRIL AND HYDROCHLOROTHIAZIDE 10; 12.5 MG/1; MG/1
TABLET ORAL
COMMUNITY
Start: 2019-08-01 | End: 2024-03-07 | Stop reason: WASHOUT

## 2024-02-24 RX ORDER — AMLODIPINE BESYLATE 5 MG/1
5 TABLET ORAL
COMMUNITY
Start: 2019-07-17

## 2024-02-24 RX ORDER — ERGOCALCIFEROL 1.25 MG/1
1.25 CAPSULE ORAL
COMMUNITY
Start: 2020-12-02 | End: 2024-04-01 | Stop reason: WASHOUT

## 2024-02-29 ENCOUNTER — APPOINTMENT (OUTPATIENT)
Dept: CARDIOLOGY | Facility: CLINIC | Age: 79
End: 2024-02-29
Payer: MEDICARE

## 2024-03-04 PROBLEM — Z98.61 STATUS POST PERCUTANEOUS TRANSLUMINAL CORONARY ANGIOPLASTY: Status: ACTIVE | Noted: 2024-03-04

## 2024-03-04 PROBLEM — Z86.79 HISTORY OF HYPERTENSION: Status: ACTIVE | Noted: 2024-03-04

## 2024-03-04 PROBLEM — J30.9 ALLERGIC RHINITIS: Status: ACTIVE | Noted: 2024-03-04

## 2024-03-04 PROBLEM — R00.2 PALPITATIONS: Status: ACTIVE | Noted: 2023-10-12

## 2024-03-04 PROBLEM — D50.9 IRON DEFICIENCY ANEMIA: Status: ACTIVE | Noted: 2023-11-14

## 2024-03-04 PROBLEM — R73.9 HYPERGLYCEMIA: Status: ACTIVE | Noted: 2024-03-04

## 2024-03-04 PROBLEM — E11.9 NEWLY DIAGNOSED DIABETES (MULTI): Status: ACTIVE | Noted: 2023-04-27

## 2024-03-04 PROBLEM — F41.9 ANXIETY: Status: ACTIVE | Noted: 2024-03-04

## 2024-03-04 PROBLEM — Z85.828 HISTORY OF MALIGNANT NEOPLASM OF SKIN: Status: ACTIVE | Noted: 2024-03-04

## 2024-03-04 NOTE — PROGRESS NOTES
"Counseling:  The patient was counseled regarding diagnostic results, instructions for management, risk factor reductions, prognosis, patient and family education, impressions, risks and benefits of treatment options and importance of compliance with treatment.      Chief Complaint:   The patient presents today for annual followup of cardiomyopathy,heart failure, and aortic root dilatation s/p echocardiogram.      History Of Present Illness:    Domo Kelley is a 78 year old male patient who presents today for annual followup of cardiomyopathy,heart failure, and aortic root dilatation s/p echocardiogram. His PMH is significant for HTN, JULIETA (compliant with CPAP), bradycardia, nonischemic cardiomyopathy/chronic systolic heart failure, DM type 2 and former smoker. Echocardiogram performed 02/05/2024 demonstrated normal LV function and mild dilatation of the ascending aorta (3.8 cm) and aortic root. Over the past year, the patient states that he has done relatively well from a cardiac standpoint. He denies any CP, chest discomfort or SOB. He reports intermittent episodes of feeling faint, which last 1-2 minutes and then resolve, and he is not sure if this related to his bifascicular block, diabetes or polycythemia. The patient is compliant with his prescribed medications.      Last Recorded Vitals:  Vitals:    03/05/24 1139   BP: (!) 152/100   BP Location: Left arm   Pulse: 56   SpO2: 98%   Weight: 96.2 kg (212 lb)   Height: 1.854 m (6' 1\")       Past Medical History:  He has a past medical history of Abnormal result of other cardiovascular function study (09/16/2019), Anxiety disorder, unspecified, Coronary angioplasty status (10/04/2019), Encounter for screening for malignant neoplasm of colon (10/10/2017), Personal history of other diseases of the circulatory system, Personal history of other diseases of the circulatory system (01/06/2020), Personal history of other diseases of the musculoskeletal system and " connective tissue, Personal history of other diseases of the respiratory system, Personal history of other malignant neoplasm of skin, Personal history of other specified conditions (07/20/2019), and Shortness of breath (10/07/2019).    Past Surgical History:  He has a past surgical history that includes Hand surgery (10/10/2017); Other surgical history (10/10/2017); Tonsillectomy (10/10/2017); and Other surgical history (07/17/2019).      Social History:  He reports that he has never smoked. He has never used smokeless tobacco. He reports current alcohol use. He reports that he does not use drugs.    Family History:  Family History   Problem Relation Name Age of Onset    Breast cancer Mother      Skin cancer Father          Allergies:  Lisinopril and Tetanus vaccines and toxoid    Outpatient Medications:  Current Outpatient Medications   Medication Instructions    amLODIPine (NORVASC) 5 mg, oral, Daily RT    berberine/herbal complex no.18 (BERBERINE-HERBAL COMB NO.18 ORAL) 1,000 mg, oral, Daily    blood-glucose sensor (FlywheelStyle Carmela 3 Sensor) device Use 1 sensor q 14 days    chlorthalidone (HYGROTON) 25 mg, oral, Daily    cyanocobalamin (Vitamin B-12) 1,000 mcg tablet 1 tablet, oral, Daily    ergocalciferol (VITAMIN D-2) 1.25 mg, oral    fluticasone (Flonase) 50 mcg/actuation nasal spray 1 spray, Each Nostril, Daily, Shake gently. Before first use, prime pump. After use, clean tip and replace cap.    lisinopriL-hydrochlorothiazide 10-12.5 mg tablet oral    losartan (COZAAR) 25 mg, oral, Daily    metFORMIN XR (Glucophage-XR) 500 mg 24 hr tablet TAKE 2 TABLETS BY MOUTH TWICE A DAY    NON FORMULARY 1 each, 1 in am and 1 in pm    OneTouch Verio test strips strip USE TO TEST ONCE DAILY     Review of Systems   Neurological:         Presyncope   All other systems reviewed and are negative.     Physical Exam:  Constitutional:       Appearance: Healthy appearance. Not in distress.   Neck:      Vascular: No JVR. JVD  normal.   Pulmonary:      Effort: Pulmonary effort is normal.      Breath sounds: Normal breath sounds. No wheezing. No rhonchi. No rales.   Chest:      Chest wall: Not tender to palpatation.   Cardiovascular:      PMI at left midclavicular line. Normal rate. Regular rhythm. Normal S1. Normal S2.       Murmurs: There is no murmur.      No gallop.  No click. No rub.   Pulses:     Intact distal pulses.   Edema:     Peripheral edema absent.   Abdominal:      General: Bowel sounds are normal.      Palpations: Abdomen is soft.      Tenderness: There is no abdominal tenderness.   Musculoskeletal: Normal range of motion.         General: No tenderness. Skin:     General: Skin is warm and dry.   Neurological:      General: No focal deficit present.      Mental Status: Alert and oriented to person, place and time.          Last Labs:  CBC -  Lab Results   Component Value Date    WBC 6.9 01/10/2024    HGB 16.9 01/10/2024    HCT 49.3 01/10/2024    MCV 81 01/10/2024     01/10/2024       CMP -  Lab Results   Component Value Date    CALCIUM 9.7 01/10/2024    PROT 7.2 11/14/2023    ALBUMIN 4.6 11/14/2023    AST 16 11/14/2023    ALT 17 11/14/2023    ALKPHOS 70 11/14/2023    BILITOT 0.7 11/14/2023       LIPID PANEL -   Lab Results   Component Value Date    CHOL 163 07/25/2023    TRIG 113 07/25/2023    HDL 43.6 07/25/2023    CHHDL 3.7 07/25/2023    LDLF 97 07/25/2023    VLDL 23 07/25/2023       RENAL FUNCTION PANEL -   Lab Results   Component Value Date    GLUCOSE 129 (H) 01/10/2024     01/10/2024    K 3.9 01/10/2024    CL 98 01/10/2024    CO2 30 01/10/2024    ANIONGAP 12 01/10/2024    BUN 17 01/10/2024    CREATININE 0.95 01/10/2024    GFRMALE 85 07/25/2023    CALCIUM 9.7 01/10/2024    ALBUMIN 4.6 11/14/2023        Lab Results   Component Value Date    HGBA1C 6.8 (A) 10/30/2023       Last Cardiology Tests:  02/05/2024 - TTE  1. Left ventricular systolic function is normal.  2. Mild dilatation of the ascending aorta (3.68  cm)  3. Mild dilatation of the aortic root     06/14/2023 - TTE  1. Left ventricular systolic function is normal with a 60-65% estimated ejection fraction.  2. There is moderate dilatation of the aortic root.     06/22/2021 - TTE  1. The left ventricular systolic function is mildly decreased with a 50% estimated ejection fraction.  2. There is global hypokinesis of the left ventricle with minor regional variations.     12/30/2019 - TTE  1. The left ventricular systolic function is mildly decreased with a 50% estimated ejection fraction.  2. There is moderate concentric left ventricular hypertrophy.     09/20/2019 - Cardiac Catheterization (LH)  1. Mild non-obstructive coronary artery disease.  2. 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      09/10/2019 - NM Cardiac Stress Test  1. Normal myocardial perfusion scan with no ischemia seen. There is an abnormal gated study showing a dilated left ventricle and mild generalized LV dysfunction. This is consistent with cardiomyopathy.   2. EKG portion nondiagnostic secondary to abnormal resting EKG without additional changes.      09/06/2019 - Echocardiogram   1. Mild left ventricular systolic dysfunction with regional abnormalities with an ejection fraction of 45%.  2. Akinetic mid and distal anterior wall segments.  3. Impaired relaxation filling pattern (Stage I diastolic dysfunction).     Lab review: I have personally reviewed the laboratory result(s).  Diagnostic review: I have personally reviewed the result(s) of the Echocardiogram.    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)     3) Bradycardia  Evaluated by EP  No further treatment unless he needs beta blockers  Reports intermittent presyncope - lasts 1-2 minutes then resolve  Denies true syncope   Check 14-day Holter   Referral to EP for followup  Followup with Marycruz Pedro NP, in 6 months     4) Fatigue  TSH 01/2022 WNL at 2.32     5) Aortic Root Dilatation   3.9 cm on TE June 2021  Repeat TTE June 2023 stable  Repeat TTE Feb 2024 stable      Scribe Attestation  By signing my name below, I, Jemma Desai   attest that this documentation has been prepared under the direction and in the presence of Carlos Rios MD.

## 2024-03-05 ENCOUNTER — OFFICE VISIT (OUTPATIENT)
Dept: CARDIOLOGY | Facility: CLINIC | Age: 79
End: 2024-03-05
Payer: MEDICARE

## 2024-03-05 ENCOUNTER — ANCILLARY PROCEDURE (OUTPATIENT)
Dept: CARDIOLOGY | Facility: CLINIC | Age: 79
End: 2024-03-05
Payer: MEDICARE

## 2024-03-05 VITALS
SYSTOLIC BLOOD PRESSURE: 152 MMHG | WEIGHT: 212 LBS | DIASTOLIC BLOOD PRESSURE: 100 MMHG | HEART RATE: 56 BPM | BODY MASS INDEX: 28.1 KG/M2 | HEIGHT: 73 IN | OXYGEN SATURATION: 98 %

## 2024-03-05 DIAGNOSIS — R42 POSTURAL DIZZINESS WITH PRESYNCOPE: ICD-10-CM

## 2024-03-05 DIAGNOSIS — I45.2 BIFASCICULAR BLOCK: Primary | ICD-10-CM

## 2024-03-05 DIAGNOSIS — I44.0 FIRST DEGREE HEART BLOCK: ICD-10-CM

## 2024-03-05 DIAGNOSIS — R55 POSTURAL DIZZINESS WITH PRESYNCOPE: ICD-10-CM

## 2024-03-05 DIAGNOSIS — I45.2 BIFASCICULAR BLOCK: ICD-10-CM

## 2024-03-05 PROCEDURE — 1159F MED LIST DOCD IN RCRD: CPT | Performed by: INTERNAL MEDICINE

## 2024-03-05 PROCEDURE — 3080F DIAST BP >= 90 MM HG: CPT | Performed by: INTERNAL MEDICINE

## 2024-03-05 PROCEDURE — 1160F RVW MEDS BY RX/DR IN RCRD: CPT | Performed by: INTERNAL MEDICINE

## 2024-03-05 PROCEDURE — 3077F SYST BP >= 140 MM HG: CPT | Performed by: INTERNAL MEDICINE

## 2024-03-05 PROCEDURE — 1126F AMNT PAIN NOTED NONE PRSNT: CPT | Performed by: INTERNAL MEDICINE

## 2024-03-05 PROCEDURE — 99213 OFFICE O/P EST LOW 20 MIN: CPT | Performed by: INTERNAL MEDICINE

## 2024-03-05 PROCEDURE — 1036F TOBACCO NON-USER: CPT | Performed by: INTERNAL MEDICINE

## 2024-03-05 ASSESSMENT — ENCOUNTER SYMPTOMS
LOSS OF SENSATION IN FEET: 0
OCCASIONAL FEELINGS OF UNSTEADINESS: 0
DEPRESSION: 0

## 2024-03-05 NOTE — LETTER
March 5, 2024     Chris Kelsey DO  [address] [facility] [address]    Patient: Domo Kelley   YOB: 1945   Date of Visit: 3/5/2024       Dear Dr. Chris Kelsey DO:    Thank you for referring Domo Kelley to me for evaluation. Below are my notes for this consultation.  If you have questions, please do not hesitate to call me. I look forward to following your patient along with you.       Sincerely,     Carlos Rios MD      CC: No Recipients  ______________________________________________________________________________________    Counseling:  The patient was counseled regarding diagnostic results, instructions for management, risk factor reductions, prognosis, patient and family education, impressions, risks and benefits of treatment options and importance of compliance with treatment.      Chief Complaint:   The patient presents today for annual followup of cardiomyopathy,heart failure, and aortic root dilatation s/p echocardiogram.      History Of Present Illness:    Domo Kelley is a 78 year old male patient who presents today for annual followup of cardiomyopathy,heart failure, and aortic root dilatation s/p echocardiogram. His PMH is significant for HTN, JULIETA (compliant with CPAP), bradycardia, nonischemic cardiomyopathy/chronic systolic heart failure, DM type 2 and former smoker. Echocardiogram performed 02/05/2024 demonstrated normal LV function and mild dilatation of the ascending aorta (3.8 cm) and aortic root. Over the past year, the patient states that he has done relatively well from a cardiac standpoint. He denies any CP, chest discomfort or SOB. He reports intermittent episodes of feeling faint, which last 1-2 minutes and then resolve, and he is not sure if this related to his bifascicular block, diabetes or polycythemia. The patient is compliant with his prescribed medications.      Last Recorded Vitals:  Vitals:    03/05/24 1139   BP: (!) 152/100   BP Location: Left arm  "  Pulse: 56   SpO2: 98%   Weight: 96.2 kg (212 lb)   Height: 1.854 m (6' 1\")       Past Medical History:  He has a past medical history of Abnormal result of other cardiovascular function study (09/16/2019), Anxiety disorder, unspecified, Coronary angioplasty status (10/04/2019), Encounter for screening for malignant neoplasm of colon (10/10/2017), Personal history of other diseases of the circulatory system, Personal history of other diseases of the circulatory system (01/06/2020), Personal history of other diseases of the musculoskeletal system and connective tissue, Personal history of other diseases of the respiratory system, Personal history of other malignant neoplasm of skin, Personal history of other specified conditions (07/20/2019), and Shortness of breath (10/07/2019).    Past Surgical History:  He has a past surgical history that includes Hand surgery (10/10/2017); Other surgical history (10/10/2017); Tonsillectomy (10/10/2017); and Other surgical history (07/17/2019).      Social History:  He reports that he has never smoked. He has never used smokeless tobacco. He reports current alcohol use. He reports that he does not use drugs.    Family History:  Family History   Problem Relation Name Age of Onset   • Breast cancer Mother     • Skin cancer Father          Allergies:  Lisinopril and Tetanus vaccines and toxoid    Outpatient Medications:  Current Outpatient Medications   Medication Instructions   • amLODIPine (NORVASC) 5 mg, oral, Daily RT   • berberine/herbal complex no.18 (BERBERINE-HERBAL COMB NO.18 ORAL) 1,000 mg, oral, Daily   • blood-glucose sensor (FreeStyle Carmela 3 Sensor) device Use 1 sensor q 14 days   • chlorthalidone (HYGROTON) 25 mg, oral, Daily   • cyanocobalamin (Vitamin B-12) 1,000 mcg tablet 1 tablet, oral, Daily   • ergocalciferol (VITAMIN D-2) 1.25 mg, oral   • fluticasone (Flonase) 50 mcg/actuation nasal spray 1 spray, Each Nostril, Daily, Shake gently. Before first use, prime " pump. After use, clean tip and replace cap.   • lisinopriL-hydrochlorothiazide 10-12.5 mg tablet oral   • losartan (COZAAR) 25 mg, oral, Daily   • metFORMIN XR (Glucophage-XR) 500 mg 24 hr tablet TAKE 2 TABLETS BY MOUTH TWICE A DAY   • NON FORMULARY 1 each, 1 in am and 1 in pm   • OneTouch Verio test strips strip USE TO TEST ONCE DAILY     Review of Systems   Neurological:         Presyncope   All other systems reviewed and are negative.     Physical Exam:  Constitutional:       Appearance: Healthy appearance. Not in distress.   Neck:      Vascular: No JVR. JVD normal.   Pulmonary:      Effort: Pulmonary effort is normal.      Breath sounds: Normal breath sounds. No wheezing. No rhonchi. No rales.   Chest:      Chest wall: Not tender to palpatation.   Cardiovascular:      PMI at left midclavicular line. Normal rate. Regular rhythm. Normal S1. Normal S2.       Murmurs: There is no murmur.      No gallop.  No click. No rub.   Pulses:     Intact distal pulses.   Edema:     Peripheral edema absent.   Abdominal:      General: Bowel sounds are normal.      Palpations: Abdomen is soft.      Tenderness: There is no abdominal tenderness.   Musculoskeletal: Normal range of motion.         General: No tenderness. Skin:     General: Skin is warm and dry.   Neurological:      General: No focal deficit present.      Mental Status: Alert and oriented to person, place and time.          Last Labs:  CBC -  Lab Results   Component Value Date    WBC 6.9 01/10/2024    HGB 16.9 01/10/2024    HCT 49.3 01/10/2024    MCV 81 01/10/2024     01/10/2024       CMP -  Lab Results   Component Value Date    CALCIUM 9.7 01/10/2024    PROT 7.2 11/14/2023    ALBUMIN 4.6 11/14/2023    AST 16 11/14/2023    ALT 17 11/14/2023    ALKPHOS 70 11/14/2023    BILITOT 0.7 11/14/2023       LIPID PANEL -   Lab Results   Component Value Date    CHOL 163 07/25/2023    TRIG 113 07/25/2023    HDL 43.6 07/25/2023    CHHDL 3.7 07/25/2023    LDLF 97 07/25/2023     VLDL 23 07/25/2023       RENAL FUNCTION PANEL -   Lab Results   Component Value Date    GLUCOSE 129 (H) 01/10/2024     01/10/2024    K 3.9 01/10/2024    CL 98 01/10/2024    CO2 30 01/10/2024    ANIONGAP 12 01/10/2024    BUN 17 01/10/2024    CREATININE 0.95 01/10/2024    GFRMALE 85 07/25/2023    CALCIUM 9.7 01/10/2024    ALBUMIN 4.6 11/14/2023        Lab Results   Component Value Date    HGBA1C 6.8 (A) 10/30/2023       Last Cardiology Tests:  02/05/2024 - TTE  1. Left ventricular systolic function is normal.  2. Mild dilatation of the ascending aorta (3.68 cm)  3. Mild dilatation of the aortic root     06/14/2023 - TTE  1. Left ventricular systolic function is normal with a 60-65% estimated ejection fraction.  2. There is moderate dilatation of the aortic root.     06/22/2021 - TTE  1. The left ventricular systolic function is mildly decreased with a 50% estimated ejection fraction.  2. There is global hypokinesis of the left ventricle with minor regional variations.     12/30/2019 - TTE  1. The left ventricular systolic function is mildly decreased with a 50% estimated ejection fraction.  2. There is moderate concentric left ventricular hypertrophy.     09/20/2019 - Cardiac Catheterization (LH)  1. Mild non-obstructive coronary artery disease.  2. 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      09/10/2019 - NM Cardiac Stress Test  1. Normal myocardial perfusion scan with no ischemia seen. There is an abnormal gated study showing a dilated left ventricle and mild generalized LV dysfunction. This is consistent with cardiomyopathy.   2. EKG portion nondiagnostic secondary to abnormal resting EKG without additional changes.      09/06/2019 - Echocardiogram   1. Mild left ventricular systolic dysfunction with regional abnormalities with an ejection fraction of 45%.  2. Akinetic mid and distal anterior wall segments.  3. Impaired relaxation filling pattern (Stage I diastolic dysfunction).     Lab review: I have  personally reviewed the laboratory result(s).  Diagnostic review: I have personally reviewed the result(s) of the Echocardiogram.    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)     3) Bradycardia  Evaluated by EP  No further treatment unless he needs beta blockers  Reports intermittent presyncope - lasts 1-2 minutes then resolve  Denies true syncope   Check 14-day Holter   Referral to EP for followup  Followup with Marycruz Pedro NP, in 6 months     4) Fatigue  TSH 01/2022 WNL at 2.32     5) Aortic Root Dilatation   3.9 cm on TE June 2021  Repeat TTE June 2023 stable  Repeat TTE Feb 2024 stable      Scribe Attestation  By signing my name below, I, Jemma Desai   attest that this documentation has been prepared under the direction and in the presence of Carlos Rios MD.

## 2024-03-05 NOTE — PATIENT INSTRUCTIONS
Continue all current medications as prescribed.  Dr. Rios has ordered a heart monitor to assess your heart rhythm.  Dr. Rios has placed a referral to Dr. Lozada for followup.  Followup with Marycruz Pedro NP, in 6 months.      If you have any questions or cardiac concerns, please call our office at [phone].

## 2024-03-07 ENCOUNTER — OFFICE VISIT (OUTPATIENT)
Dept: PRIMARY CARE | Facility: CLINIC | Age: 79
End: 2024-03-07
Payer: MEDICARE

## 2024-03-07 VITALS
HEART RATE: 55 BPM | BODY MASS INDEX: 28.23 KG/M2 | DIASTOLIC BLOOD PRESSURE: 68 MMHG | OXYGEN SATURATION: 98 % | WEIGHT: 213 LBS | SYSTOLIC BLOOD PRESSURE: 114 MMHG | RESPIRATION RATE: 16 BRPM | HEIGHT: 73 IN

## 2024-03-07 DIAGNOSIS — G47.33 OBSTRUCTIVE SLEEP APNEA SYNDROME: ICD-10-CM

## 2024-03-07 DIAGNOSIS — R53.83 OTHER FATIGUE: ICD-10-CM

## 2024-03-07 DIAGNOSIS — I10 BENIGN ESSENTIAL HYPERTENSION: ICD-10-CM

## 2024-03-07 DIAGNOSIS — I42.8 NONISCHEMIC CARDIOMYOPATHY (MULTI): ICD-10-CM

## 2024-03-07 DIAGNOSIS — I77.810 AORTIC ROOT DILATATION (CMS-HCC): ICD-10-CM

## 2024-03-07 DIAGNOSIS — I44.0 FIRST DEGREE HEART BLOCK: ICD-10-CM

## 2024-03-07 DIAGNOSIS — D45 POLYCYTHEMIA VERA (MULTI): ICD-10-CM

## 2024-03-07 DIAGNOSIS — Z00.00 MEDICARE ANNUAL WELLNESS VISIT, SUBSEQUENT: Primary | ICD-10-CM

## 2024-03-07 DIAGNOSIS — E11.9 CONTROLLED TYPE 2 DIABETES MELLITUS WITHOUT COMPLICATION, WITHOUT LONG-TERM CURRENT USE OF INSULIN (MULTI): ICD-10-CM

## 2024-03-07 DIAGNOSIS — I45.10 RIGHT BUNDLE BRANCH BLOCK (RBBB) DETERMINED BY ELECTROCARDIOGRAPHY: ICD-10-CM

## 2024-03-07 DIAGNOSIS — I50.22 CHRONIC SYSTOLIC HEART FAILURE (MULTI): ICD-10-CM

## 2024-03-07 PROCEDURE — 1159F MED LIST DOCD IN RCRD: CPT | Performed by: FAMILY MEDICINE

## 2024-03-07 PROCEDURE — 3074F SYST BP LT 130 MM HG: CPT | Performed by: FAMILY MEDICINE

## 2024-03-07 PROCEDURE — 99214 OFFICE O/P EST MOD 30 MIN: CPT | Performed by: FAMILY MEDICINE

## 2024-03-07 PROCEDURE — 1036F TOBACCO NON-USER: CPT | Performed by: FAMILY MEDICINE

## 2024-03-07 PROCEDURE — 1126F AMNT PAIN NOTED NONE PRSNT: CPT | Performed by: FAMILY MEDICINE

## 2024-03-07 PROCEDURE — 1170F FXNL STATUS ASSESSED: CPT | Performed by: FAMILY MEDICINE

## 2024-03-07 PROCEDURE — 3078F DIAST BP <80 MM HG: CPT | Performed by: FAMILY MEDICINE

## 2024-03-07 PROCEDURE — 1158F ADVNC CARE PLAN TLK DOCD: CPT | Performed by: FAMILY MEDICINE

## 2024-03-07 PROCEDURE — 1123F ACP DISCUSS/DSCN MKR DOCD: CPT | Performed by: FAMILY MEDICINE

## 2024-03-07 PROCEDURE — G0439 PPPS, SUBSEQ VISIT: HCPCS | Performed by: FAMILY MEDICINE

## 2024-03-07 PROCEDURE — 1160F RVW MEDS BY RX/DR IN RCRD: CPT | Performed by: FAMILY MEDICINE

## 2024-03-07 RX ORDER — BLOOD-GLUCOSE SENSOR
EACH MISCELLANEOUS
Qty: 3 EACH | Refills: 11 | Status: SHIPPED | OUTPATIENT
Start: 2024-03-07

## 2024-03-07 RX ORDER — DULAGLUTIDE 0.75 MG/.5ML
0.75 INJECTION, SOLUTION SUBCUTANEOUS
Qty: 2 ML | Refills: 11 | Status: SHIPPED | OUTPATIENT
Start: 2024-03-07

## 2024-03-07 RX ORDER — ATORVASTATIN CALCIUM 10 MG/1
10 TABLET, FILM COATED ORAL DAILY
Qty: 100 TABLET | Refills: 3 | Status: SHIPPED | OUTPATIENT
Start: 2024-03-07 | End: 2025-04-11

## 2024-03-07 ASSESSMENT — ACTIVITIES OF DAILY LIVING (ADL)
MANAGING_FINANCES: INDEPENDENT
TAKING_MEDICATION: INDEPENDENT
GROCERY_SHOPPING: INDEPENDENT
DOING_HOUSEWORK: INDEPENDENT
DRESSING: INDEPENDENT
BATHING: INDEPENDENT

## 2024-03-07 ASSESSMENT — PATIENT HEALTH QUESTIONNAIRE - PHQ9
1. LITTLE INTEREST OR PLEASURE IN DOING THINGS: NOT AT ALL
SUM OF ALL RESPONSES TO PHQ9 QUESTIONS 1 AND 2: 0
2. FEELING DOWN, DEPRESSED OR HOPELESS: NOT AT ALL

## 2024-03-07 NOTE — PROGRESS NOTES
"Subjective   Reason for Visit: Domo Kelley is an 78 y.o. male here for follow-up on chronic medical conditions and Medicare  Hemoglobin A1c     HPI  Reviewed Chronic illnesses    Patient Care Team:  Chris Kelsey DO as PCP - General  Chris Kelsey DO as PCP - [facility] Attributed Provider  Carlos Rios MD as Consulting Physician (Cardiology)  Dominga Sorenson MD (Dermatology)  Rasta Sequeira MD as Consulting Physician (Hematology and Oncology)     Review of Systems    Objective   Vitals:  /68 (BP Location: Right arm, Patient Position: Sitting, BP Cuff Size: Large adult)   Pulse 55   Resp 16   Ht 1.854 m (6' 1\")   Wt 96.6 kg (213 lb)   SpO2 98%   BMI 28.10 kg/m²       Physical Exam  General: Patient is alert and oriented ×3 and appears in no acute distress. No respiratory distress.    Head: Atraumatic normocephalic.    Eyes: EOMI, PERRLA      Ears: Canals patent without any irritation, tympanic membranes without inflammation, no swelling, normal light reflex.    Nose: Nares patent. Turbinates are swollen. No discharge.    Mouth: Normal mucosa. Moist. No erythema, exudates, tonsillar enlargement.    Neck: Normal range of motion, no masses.  Thyroid is palpable and normal in size without any nodules. No anterior cervical or posterior cervical adenopathy.    Heart: Regular rate and rhythm, no murmurs clicks or gallops    Lungs: Clear to auscultation bilaterally without any rhonchi rales or wheezing, lung sounds heard throughout all lung fields    Abdomen: Soft, nontender, no rigidity, rebound, guarding or organomegaly. Bowel sounds ×4 quadrants.    Musculoskeletal: Normal range of motion, strength is grossly intact in the proximal distal muscles of the upper and lower extremities bilaterally, deep tendon reflexes +2 out of 4 and symmetric bilaterally at the patella, Achilles, biceps, triceps, sensation intact.    Nerves: Cranial nerves II through XII appear grossly intact and without deficit    Skin: " Intact, dry, no rashes or erythema    Psych: Normal affect.  Assessment/Plan   Problem List Items Addressed This Visit       Aortic root dilatation (CMS/HCC)    Benign essential hypertension    Chronic systolic heart failure (CMS/HCC)    Controlled type 2 diabetes mellitus without complication, without long-term current use of insulin (CMS/[facility])    Relevant Medications    blood-glucose sensor (FreeStyle Carmela 3 Sensor) device    dulaglutide (Trulicity) 0.75 mg/0.5 mL pen injector    First degree heart block    Nonischemic cardiomyopathy (CMS/HCC)    Obstructive sleep apnea syndrome    Polycythemia vera (CMS/HCC)    Overview     POLYCYTHEMIA VERA - (D45)         Right bundle branch block (RBBB) determined by electrocardiography    Fatigue    Relevant Orders    Thyroid Stimulating Hormone    Triiodothyronine, Free    Thyroxine, Free    Vitamin B12    Vitamin D 1,25 Dihydroxy (for eval of hypercalcemia)    Testosterone,Free and Total     Other Visit Diagnoses       Medicare annual wellness visit, subsequent    -  Primary        1. Polycythemia   Initially diagnosed 2015 and received treatment through [facility]   with therapeutic phlebotomies. Transferred care closer [place] at [facility] [place] [facility].   Current treatment- therapeutic phlebotomies every 2 months to maintain goal   hematocrit <45%. Hematocrit today- 47.8%      diabetes type 2  - Controlled with HBA1c 6.9 3/7/24  -metformin ER 1000 mg twice daily.   - start Trulicity .75 mg weekly  -Patient followed up with [facility] December 2023  -Mediteranian diet suggested  - Microalbumin done 10/30/2023 normal  - exercise at least 15 to 20 minutes a day 4 days a week  - Atorvastatin 10 mg daily started  - Continue Berberine 500 mg 3 x day  - Freestyle Carmela  -Repeat labs in 6 months    Essential hypertension  -Controlled at 114/68    Obstructive sleep apnea  -Controlled on CPAP patient uses the CPAP for greater than 4 hours a night and uses it every  night. Notices relief when using the CPAP.    Vitamin D deficiency         Polycythemia vera  -Receiving phlebotomy every 2 months. Dr. Moctezuma hematologist. DR CHRISTOPHER following    Noniscemic Cardiomyopathy/aortic root dilation/chronic systolic heart failure  02/05/2024 - TTE  1. Left ventricular systolic function is normal.  2. Mild dilatation of the ascending aorta (3.68 cm)  3. Mild dilatation of the aortic root   - Stable and followed by Dr Rios    Squamous cell carcinoma- Followed by Dr Sorenson Dermatology    Allergic rhinitis  - Instructed to use Flonase 1 spray per nostril after using salt water nasal rinses    Fatigue  - Labs ordered    Follow up in 6 months

## 2024-03-11 ENCOUNTER — INFUSION (OUTPATIENT)
Dept: HEMATOLOGY/ONCOLOGY | Facility: CLINIC | Age: 79
End: 2024-03-11
Payer: MEDICARE

## 2024-03-11 VITALS
SYSTOLIC BLOOD PRESSURE: 130 MMHG | HEART RATE: 90 BPM | RESPIRATION RATE: 16 BRPM | BODY MASS INDEX: 28.74 KG/M2 | WEIGHT: 212.2 LBS | OXYGEN SATURATION: 96 % | DIASTOLIC BLOOD PRESSURE: 84 MMHG | TEMPERATURE: 97.9 F | HEIGHT: 72 IN

## 2024-03-11 DIAGNOSIS — D45 POLYCYTHEMIA VERA (MULTI): ICD-10-CM

## 2024-03-11 DIAGNOSIS — D75.1 SECONDARY POLYCYTHEMIA: ICD-10-CM

## 2024-03-11 DIAGNOSIS — R53.82 CHRONIC FATIGUE: ICD-10-CM

## 2024-03-11 DIAGNOSIS — D50.9 IRON DEFICIENCY ANEMIA, UNSPECIFIED IRON DEFICIENCY ANEMIA TYPE: ICD-10-CM

## 2024-03-11 LAB
ALBUMIN SERPL BCP-MCNC: 4.5 G/DL (ref 3.4–5)
ALP SERPL-CCNC: 77 U/L (ref 33–136)
ALT SERPL W P-5'-P-CCNC: 13 U/L (ref 10–52)
ANION GAP SERPL CALC-SCNC: 12 MMOL/L (ref 10–20)
AST SERPL W P-5'-P-CCNC: 13 U/L (ref 9–39)
BASOPHILS # BLD AUTO: 0.02 X10*3/UL (ref 0–0.1)
BASOPHILS NFR BLD AUTO: 0.2 %
BILIRUB SERPL-MCNC: 0.6 MG/DL (ref 0–1.2)
BUN SERPL-MCNC: 23 MG/DL (ref 6–23)
CALCIUM SERPL-MCNC: 9.2 MG/DL (ref 8.6–10.3)
CHLORIDE SERPL-SCNC: 98 MMOL/L (ref 98–107)
CO2 SERPL-SCNC: 29 MMOL/L (ref 21–32)
CREAT SERPL-MCNC: 0.95 MG/DL (ref 0.5–1.3)
EGFRCR SERPLBLD CKD-EPI 2021: 82 ML/MIN/1.73M*2
EOSINOPHIL # BLD AUTO: 0.1 X10*3/UL (ref 0–0.4)
EOSINOPHIL NFR BLD AUTO: 1.2 %
ERYTHROCYTE [DISTWIDTH] IN BLOOD BY AUTOMATED COUNT: 13.1 % (ref 11.5–14.5)
GLUCOSE SERPL-MCNC: 149 MG/DL (ref 74–99)
HCT VFR BLD AUTO: 50 % (ref 41–52)
HGB BLD-MCNC: 17.2 G/DL (ref 13.5–17.5)
IMM GRANULOCYTES # BLD AUTO: 0.01 X10*3/UL (ref 0–0.5)
IMM GRANULOCYTES NFR BLD AUTO: 0.1 % (ref 0–0.9)
IRON SATN MFR SERPL: 17 % (ref 25–45)
IRON SERPL-MCNC: 73 UG/DL (ref 35–150)
LYMPHOCYTES # BLD AUTO: 2.91 X10*3/UL (ref 0.8–3)
LYMPHOCYTES NFR BLD AUTO: 33.6 %
MCH RBC QN AUTO: 27.6 PG (ref 26–34)
MCHC RBC AUTO-ENTMCNC: 34.4 G/DL (ref 32–36)
MCV RBC AUTO: 80 FL (ref 80–100)
MONOCYTES # BLD AUTO: 0.7 X10*3/UL (ref 0.05–0.8)
MONOCYTES NFR BLD AUTO: 8.1 %
NEUTROPHILS # BLD AUTO: 4.92 X10*3/UL (ref 1.6–5.5)
NEUTROPHILS NFR BLD AUTO: 56.8 %
PLATELET # BLD AUTO: 197 X10*3/UL (ref 150–450)
POTASSIUM SERPL-SCNC: 3.9 MMOL/L (ref 3.5–5.3)
PROT SERPL-MCNC: 7 G/DL (ref 6.4–8.2)
RBC # BLD AUTO: 6.23 X10*6/UL (ref 4.5–5.9)
SODIUM SERPL-SCNC: 135 MMOL/L (ref 136–145)
TIBC SERPL-MCNC: 430 UG/DL (ref 240–445)
UIBC SERPL-MCNC: 357 UG/DL (ref 110–370)
WBC # BLD AUTO: 8.7 X10*3/UL (ref 4.4–11.3)

## 2024-03-11 PROCEDURE — 99195 PHLEBOTOMY: CPT

## 2024-03-11 PROCEDURE — 36415 COLL VENOUS BLD VENIPUNCTURE: CPT

## 2024-03-11 RX ORDER — DIPHENHYDRAMINE HYDROCHLORIDE 50 MG/ML
50 INJECTION INTRAMUSCULAR; INTRAVENOUS AS NEEDED
Status: DISCONTINUED | OUTPATIENT
Start: 2024-03-11 | End: 2024-03-11 | Stop reason: HOSPADM

## 2024-03-11 RX ORDER — FAMOTIDINE 10 MG/ML
20 INJECTION INTRAVENOUS ONCE AS NEEDED
Status: CANCELLED | OUTPATIENT
Start: 2024-05-06

## 2024-03-11 RX ORDER — ALBUTEROL SULFATE 0.83 MG/ML
3 SOLUTION RESPIRATORY (INHALATION) AS NEEDED
Status: DISCONTINUED | OUTPATIENT
Start: 2024-03-11 | End: 2024-03-11 | Stop reason: HOSPADM

## 2024-03-11 RX ORDER — ALBUTEROL SULFATE 0.83 MG/ML
3 SOLUTION RESPIRATORY (INHALATION) AS NEEDED
Status: CANCELLED | OUTPATIENT
Start: 2024-05-06

## 2024-03-11 RX ORDER — EPINEPHRINE 0.3 MG/.3ML
0.3 INJECTION SUBCUTANEOUS EVERY 5 MIN PRN
Status: DISCONTINUED | OUTPATIENT
Start: 2024-03-11 | End: 2024-03-11 | Stop reason: HOSPADM

## 2024-03-11 RX ORDER — DIPHENHYDRAMINE HYDROCHLORIDE 50 MG/ML
50 INJECTION INTRAMUSCULAR; INTRAVENOUS AS NEEDED
Status: CANCELLED | OUTPATIENT
Start: 2024-05-06

## 2024-03-11 RX ORDER — FAMOTIDINE 10 MG/ML
20 INJECTION INTRAVENOUS ONCE AS NEEDED
Status: DISCONTINUED | OUTPATIENT
Start: 2024-03-11 | End: 2024-03-11 | Stop reason: HOSPADM

## 2024-03-11 RX ORDER — EPINEPHRINE 0.3 MG/.3ML
0.3 INJECTION SUBCUTANEOUS EVERY 5 MIN PRN
Status: CANCELLED | OUTPATIENT
Start: 2024-05-06

## 2024-03-11 ASSESSMENT — PAIN SCALES - GENERAL: PAINLEVEL: 0-NO PAIN

## 2024-03-11 NOTE — PROGRESS NOTES
Patient in clinic for therapeutic phlebotomy. HCT-50.0 with labs. Patient tolerated without issue, VS remained stable. Patient stated he does not typically stay after for observation and leaves after VS taken. Denied problem or concern. Ambulated out of unit without issue.

## 2024-04-01 ENCOUNTER — OFFICE VISIT (OUTPATIENT)
Dept: CARDIOLOGY | Facility: CLINIC | Age: 79
End: 2024-04-01
Payer: MEDICARE

## 2024-04-01 VITALS
WEIGHT: 210 LBS | DIASTOLIC BLOOD PRESSURE: 82 MMHG | HEIGHT: 72 IN | BODY MASS INDEX: 28.44 KG/M2 | SYSTOLIC BLOOD PRESSURE: 138 MMHG | HEART RATE: 54 BPM

## 2024-04-01 DIAGNOSIS — I45.2 BIFASCICULAR BLOCK: ICD-10-CM

## 2024-04-01 DIAGNOSIS — R42 POSTURAL DIZZINESS WITH PRESYNCOPE: ICD-10-CM

## 2024-04-01 DIAGNOSIS — R55 POSTURAL DIZZINESS WITH PRESYNCOPE: ICD-10-CM

## 2024-04-01 DIAGNOSIS — I45.3 BLOCK, TRIFASCICULAR: ICD-10-CM

## 2024-04-01 DIAGNOSIS — R00.1 BRADYCARDIA: ICD-10-CM

## 2024-04-01 DIAGNOSIS — I44.0 FIRST DEGREE HEART BLOCK: ICD-10-CM

## 2024-04-01 PROCEDURE — 3075F SYST BP GE 130 - 139MM HG: CPT | Performed by: INTERNAL MEDICINE

## 2024-04-01 PROCEDURE — 99214 OFFICE O/P EST MOD 30 MIN: CPT | Performed by: INTERNAL MEDICINE

## 2024-04-01 PROCEDURE — 93000 ELECTROCARDIOGRAM COMPLETE: CPT | Performed by: INTERNAL MEDICINE

## 2024-04-01 PROCEDURE — 1123F ACP DISCUSS/DSCN MKR DOCD: CPT | Performed by: INTERNAL MEDICINE

## 2024-04-01 PROCEDURE — 1159F MED LIST DOCD IN RCRD: CPT | Performed by: INTERNAL MEDICINE

## 2024-04-01 PROCEDURE — 3079F DIAST BP 80-89 MM HG: CPT | Performed by: INTERNAL MEDICINE

## 2024-04-01 PROCEDURE — 1160F RVW MEDS BY RX/DR IN RCRD: CPT | Performed by: INTERNAL MEDICINE

## 2024-04-01 NOTE — PROGRESS NOTES
[facility] Heart and Vascular Electrophysiology    Patient Name: Domo Kelley  Patient : 1945    Referred  for Conduction system disease    Domo Kelley is a 78 y.o. year old male patient with    Aortic root dilation: Echocardiogram 2024 showed mild dilation of ascending aorta 3.8cm and aortic root  Hypertension  JULIETA  NICM  Diabetes  Bradycardia with conduction system disease: We originally saw patient in 2019 with asymptomatic bradycardia at that time, noted on monitor which also noted sinus pause of 2.4 seconds. No episodes of AV block noted. ECG at that time was sinus with RBBB, LAFB, and prolonged NM which placed him at risk of developing high degree AV block. At that time no indication for permanent pacing.        Patient now referred back as he has experienced feeling of faintness, lasting 1-2 minutes.     Patient here for follow up for bradycardia. He reports he was feeling episodes of lightheadedness and fogginess. Episodes occur with exertion and at rest. He has experienced near syncope a few times with exertion. He is compliant with daily medications and is on no rate slowing medications.        Past Medical History:  He has a past medical history of Abnormal result of other cardiovascular function study (2019), Anxiety disorder, unspecified, Coronary angioplasty status (10/04/2019), Encounter for screening for malignant neoplasm of colon (10/10/2017), Personal history of other diseases of the circulatory system, Personal history of other diseases of the circulatory system (2020), Personal history of other diseases of the musculoskeletal system and connective tissue, Personal history of other diseases of the respiratory system, Personal history of other malignant neoplasm of skin, Personal history of other specified conditions (2019), and Shortness of breath (10/07/2019).    Past Surgical History:  He has a past surgical history that includes Hand surgery  (10/10/2017); Other surgical history (10/10/2017); Tonsillectomy (10/10/2017); and Other surgical history (07/17/2019).      Social History:  He reports that he has never smoked. He has never used smokeless tobacco. He reports current alcohol use. He reports that he does not use drugs.    Family History:  Family History   Problem Relation Name Age of Onset    Breast cancer Mother      Skin cancer Father          Allergies:  Lisinopril and Tetanus vaccines and toxoid    Outpatient Medications:  Current Outpatient Medications   Medication Instructions    amLODIPine (NORVASC) 5 mg, oral, Daily RT    atorvastatin (LIPITOR) 10 mg, oral, Daily    berberine/herbal complex no.18 (BERBERINE-HERBAL COMB NO.18 ORAL) 1,000 mg, oral, Daily    blood-glucose sensor (FreeStyle Carmela 3 Sensor) device Use 1 sensor q 14 days    chlorthalidone (HYGROTON) 25 mg, oral, Daily    cyanocobalamin (Vitamin B-12) 1,000 mcg tablet 1 tablet, oral, Daily    fluticasone (Flonase) 50 mcg/actuation nasal spray 1 spray, Each Nostril, Daily, Shake gently. Before first use, prime pump. After use, clean tip and replace cap.    losartan (COZAAR) 25 mg, oral, Daily    metFORMIN XR (Glucophage-XR) 500 mg 24 hr tablet TAKE 2 TABLETS BY MOUTH TWICE A DAY    Trulicity 0.75 mg, subcutaneous, Weekly        ROS:  A 14 point review of systems was done and is negative other than as stated in HPI    Vitals:  Vitals:    04/01/24 0831   BP: 138/82   Pulse: 54   Weight: 95.3 kg (210 lb)   Height: 1.829 m (6')       Physical Exam  Constitutional:       Appearance: Normal appearance.   Eyes:      Pupils: Pupils are equal, round, and reactive to light.   Cardiovascular:      Rate and Rhythm: Normal rate and regular rhythm.      Heart sounds: Normal heart sounds.   Pulmonary:      Effort: Pulmonary effort is normal.      Breath sounds: Normal breath sounds.   Musculoskeletal:         General: Normal range of motion.   Skin:     General: Skin is warm and dry.    Neurological:      Mental Status: He is alert and oriented to person, place, and time.          Labs:   Lab Results   Component Value Date    WBC 8.7 03/11/2024    HGB 17.2 03/11/2024    HCT 50.0 03/11/2024     03/11/2024    ALT 13 03/11/2024    AST 13 03/11/2024     (L) 03/11/2024    K 3.9 03/11/2024    CL 98 03/11/2024    CREATININE 0.95 03/11/2024    BUN 23 03/11/2024    CO2 29 03/11/2024    TSH 2.32 01/19/2022        Cardiac Testing:  ECG  4/1/2024 sinus bradycardia with rate of 54 with prolonged WI 316ms, RBBB, LAFB    Echocardiogram February 2024  1. Left ventricular systolic function is normal.  2. Mild dilatation of the ascending aorta (3.68 cm)  3. Mild dilatation of the aortic root      Echocardiogram June 2023  1. Left ventricular systolic function is normal with a 60-65% estimated ejection fraction.  2. There is moderate dilatation of the aortic root.      Assessment:   Trifascicular block: Patient is now symptomatic with severe conduction system disease. He would benefits from pacemaker implant. Risks and benefits discussed. Patient is agreeable to pacemaker implant.     Plan:  Schedule dual chamber pacemaker  We will follow up 2 weeks post implant

## 2024-04-03 ENCOUNTER — TELEPHONE (OUTPATIENT)
Dept: CARDIOLOGY | Facility: CLINIC | Age: 79
End: 2024-04-03
Payer: MEDICARE

## 2024-04-03 LAB — BODY SURFACE AREA: 2.23 M2

## 2024-04-04 NOTE — TELEPHONE ENCOUNTER
"Patient saw Dr Lozada 4/1/24 and his note states \"Trifascicular block: Patient is now symptomatic with severe conduction system disease. He would benefits from pacemaker implant. Risks and benefits discussed. Patient is agreeable to pacemaker implant.   He was scheduled 4/2/24 for pacemaker but it was cancelled it looks like. No future date yet but I sent message to Arleen/Az to see about when it would be rescheduled.   Should he still start Carvedilol with above issues and pauses on holter?  "

## 2024-04-05 NOTE — TELEPHONE ENCOUNTER
I called and spoke with patient and let him know plan to hold off on starting Carvedilol after PPM is placed. I set a reminder to watch for when pacemaker is placed to then get Carvedilol ordered.

## 2024-04-15 DIAGNOSIS — I45.3 TRIFASCICULAR BUNDLE BRANCH BLOCK: Primary | ICD-10-CM

## 2024-04-30 ENCOUNTER — APPOINTMENT (OUTPATIENT)
Dept: PRIMARY CARE | Facility: CLINIC | Age: 79
End: 2024-04-30
Payer: MEDICARE

## 2024-05-03 ENCOUNTER — TELEPHONE (OUTPATIENT)
Dept: CARDIOLOGY | Facility: CLINIC | Age: 79
End: 2024-05-03
Payer: MEDICARE

## 2024-05-03 NOTE — TELEPHONE ENCOUNTER
Patient is scheduled for a pacemaker implant with Dr. Lozada on 5/15/24 at [place] with arrival time of 6:30. Labs are up to date. NPO after midnight the night before the procedure. Take morning medications with a sip of water. Patient will stay overnight for observation. He will need a ride home. The patient verbalized understanding of instructions including appointment date, time, and location. All questions answered.

## 2024-05-06 ENCOUNTER — LAB (OUTPATIENT)
Dept: LAB | Facility: HOSPITAL | Age: 79
End: 2024-05-06
Payer: MEDICARE

## 2024-05-06 DIAGNOSIS — D75.1 SECONDARY POLYCYTHEMIA: ICD-10-CM

## 2024-05-06 DIAGNOSIS — R53.82 CHRONIC FATIGUE: ICD-10-CM

## 2024-05-06 DIAGNOSIS — D50.9 IRON DEFICIENCY ANEMIA, UNSPECIFIED IRON DEFICIENCY ANEMIA TYPE: ICD-10-CM

## 2024-05-06 DIAGNOSIS — D45 POLYCYTHEMIA VERA (MULTI): ICD-10-CM

## 2024-05-06 LAB
ALBUMIN SERPL BCP-MCNC: 4.4 G/DL (ref 3.4–5)
ALP SERPL-CCNC: 68 U/L (ref 33–136)
ALT SERPL W P-5'-P-CCNC: 11 U/L (ref 10–52)
ANION GAP SERPL CALC-SCNC: 14 MMOL/L (ref 10–20)
AST SERPL W P-5'-P-CCNC: 12 U/L (ref 9–39)
BASOPHILS # BLD AUTO: 0.01 X10*3/UL (ref 0–0.1)
BASOPHILS NFR BLD AUTO: 0.2 %
BILIRUB SERPL-MCNC: 0.7 MG/DL (ref 0–1.2)
BUN SERPL-MCNC: 20 MG/DL (ref 6–23)
CALCIUM SERPL-MCNC: 9.1 MG/DL (ref 8.6–10.3)
CHLORIDE SERPL-SCNC: 98 MMOL/L (ref 98–107)
CO2 SERPL-SCNC: 25 MMOL/L (ref 21–32)
CREAT SERPL-MCNC: 0.96 MG/DL (ref 0.5–1.3)
EGFRCR SERPLBLD CKD-EPI 2021: 81 ML/MIN/1.73M*2
EOSINOPHIL # BLD AUTO: 0.08 X10*3/UL (ref 0–0.4)
EOSINOPHIL NFR BLD AUTO: 1.2 %
ERYTHROCYTE [DISTWIDTH] IN BLOOD BY AUTOMATED COUNT: 13 % (ref 11.5–14.5)
GLUCOSE SERPL-MCNC: 208 MG/DL (ref 74–99)
HCT VFR BLD AUTO: 48.1 % (ref 41–52)
HGB BLD-MCNC: 16.7 G/DL (ref 13.5–17.5)
IMM GRANULOCYTES # BLD AUTO: 0.01 X10*3/UL (ref 0–0.5)
IMM GRANULOCYTES NFR BLD AUTO: 0.2 % (ref 0–0.9)
IRON SATN MFR SERPL: 18 % (ref 25–45)
IRON SERPL-MCNC: 78 UG/DL (ref 35–150)
LYMPHOCYTES # BLD AUTO: 2.05 X10*3/UL (ref 0.8–3)
LYMPHOCYTES NFR BLD AUTO: 31.8 %
MCH RBC QN AUTO: 28.5 PG (ref 26–34)
MCHC RBC AUTO-ENTMCNC: 34.7 G/DL (ref 32–36)
MCV RBC AUTO: 82 FL (ref 80–100)
MONOCYTES # BLD AUTO: 0.51 X10*3/UL (ref 0.05–0.8)
MONOCYTES NFR BLD AUTO: 7.9 %
NEUTROPHILS # BLD AUTO: 3.78 X10*3/UL (ref 1.6–5.5)
NEUTROPHILS NFR BLD AUTO: 58.7 %
PLATELET # BLD AUTO: 183 X10*3/UL (ref 150–450)
POTASSIUM SERPL-SCNC: 3.6 MMOL/L (ref 3.5–5.3)
PROT SERPL-MCNC: 6.9 G/DL (ref 6.4–8.2)
RBC # BLD AUTO: 5.85 X10*6/UL (ref 4.5–5.9)
SODIUM SERPL-SCNC: 133 MMOL/L (ref 136–145)
TIBC SERPL-MCNC: 439 UG/DL (ref 240–445)
UIBC SERPL-MCNC: 361 UG/DL (ref 110–370)
WBC # BLD AUTO: 6.4 X10*3/UL (ref 4.4–11.3)

## 2024-05-06 PROCEDURE — 36415 COLL VENOUS BLD VENIPUNCTURE: CPT

## 2024-05-06 PROCEDURE — 83540 ASSAY OF IRON: CPT | Performed by: INTERNAL MEDICINE

## 2024-05-06 PROCEDURE — 84075 ASSAY ALKALINE PHOSPHATASE: CPT | Performed by: INTERNAL MEDICINE

## 2024-05-06 PROCEDURE — 85025 COMPLETE CBC W/AUTO DIFF WBC: CPT | Performed by: INTERNAL MEDICINE

## 2024-05-08 ENCOUNTER — OFFICE VISIT (OUTPATIENT)
Dept: HEMATOLOGY/ONCOLOGY | Facility: CLINIC | Age: 79
End: 2024-05-08
Payer: MEDICARE

## 2024-05-08 ENCOUNTER — INFUSION (OUTPATIENT)
Dept: HEMATOLOGY/ONCOLOGY | Facility: CLINIC | Age: 79
End: 2024-05-08
Payer: MEDICARE

## 2024-05-08 VITALS — RESPIRATION RATE: 16 BRPM | DIASTOLIC BLOOD PRESSURE: 78 MMHG | HEART RATE: 87 BPM | SYSTOLIC BLOOD PRESSURE: 150 MMHG

## 2024-05-08 VITALS
SYSTOLIC BLOOD PRESSURE: 151 MMHG | HEART RATE: 83 BPM | OXYGEN SATURATION: 96 % | RESPIRATION RATE: 16 BRPM | BODY MASS INDEX: 28.01 KG/M2 | HEIGHT: 72 IN | WEIGHT: 206.79 LBS | TEMPERATURE: 98.6 F | DIASTOLIC BLOOD PRESSURE: 89 MMHG

## 2024-05-08 DIAGNOSIS — D50.9 IRON DEFICIENCY ANEMIA, UNSPECIFIED IRON DEFICIENCY ANEMIA TYPE: ICD-10-CM

## 2024-05-08 DIAGNOSIS — R53.82 CHRONIC FATIGUE: ICD-10-CM

## 2024-05-08 DIAGNOSIS — D45 POLYCYTHEMIA VERA (MULTI): ICD-10-CM

## 2024-05-08 DIAGNOSIS — D75.1 SECONDARY POLYCYTHEMIA: ICD-10-CM

## 2024-05-08 PROCEDURE — 99213 OFFICE O/P EST LOW 20 MIN: CPT | Performed by: INTERNAL MEDICINE

## 2024-05-08 PROCEDURE — 1126F AMNT PAIN NOTED NONE PRSNT: CPT | Performed by: INTERNAL MEDICINE

## 2024-05-08 PROCEDURE — 1123F ACP DISCUSS/DSCN MKR DOCD: CPT | Performed by: INTERNAL MEDICINE

## 2024-05-08 PROCEDURE — 1159F MED LIST DOCD IN RCRD: CPT | Performed by: INTERNAL MEDICINE

## 2024-05-08 PROCEDURE — 1160F RVW MEDS BY RX/DR IN RCRD: CPT | Performed by: INTERNAL MEDICINE

## 2024-05-08 PROCEDURE — 99195 PHLEBOTOMY: CPT

## 2024-05-08 PROCEDURE — 3077F SYST BP >= 140 MM HG: CPT | Performed by: INTERNAL MEDICINE

## 2024-05-08 PROCEDURE — 3079F DIAST BP 80-89 MM HG: CPT | Performed by: INTERNAL MEDICINE

## 2024-05-08 RX ORDER — DIPHENHYDRAMINE HYDROCHLORIDE 50 MG/ML
50 INJECTION INTRAMUSCULAR; INTRAVENOUS AS NEEDED
OUTPATIENT
Start: 2024-07-01

## 2024-05-08 RX ORDER — FAMOTIDINE 10 MG/ML
20 INJECTION INTRAVENOUS ONCE AS NEEDED
OUTPATIENT
Start: 2024-07-01

## 2024-05-08 RX ORDER — HEPARIN SODIUM,PORCINE/PF 10 UNIT/ML
50 SYRINGE (ML) INTRAVENOUS AS NEEDED
OUTPATIENT
Start: 2024-05-08

## 2024-05-08 RX ORDER — ALBUTEROL SULFATE 0.83 MG/ML
3 SOLUTION RESPIRATORY (INHALATION) AS NEEDED
OUTPATIENT
Start: 2024-07-01

## 2024-05-08 RX ORDER — EPINEPHRINE 0.3 MG/.3ML
0.3 INJECTION SUBCUTANEOUS EVERY 5 MIN PRN
OUTPATIENT
Start: 2024-07-01

## 2024-05-08 RX ORDER — HEPARIN 100 UNIT/ML
500 SYRINGE INTRAVENOUS AS NEEDED
OUTPATIENT
Start: 2024-05-08

## 2024-05-08 ASSESSMENT — PAIN SCALES - GENERAL: PAINLEVEL: 0-NO PAIN

## 2024-05-08 NOTE — PROGRESS NOTES
Patient Visit Information:   Visit Type: Follow Up Visit      History of Present Illness:      ID Statement:    DOMO ROBERTS is a 77 year old Male        Chief Complaint: Polycythemia vera   Interval History:      Domo Roberts presents today for interval follow-up and treatment of polycythemia.     He reports he overall feels well. He has been receiving therapeutic phlebotomies every 2 months and tolerating well with good response.    Sometime patient has a headache, very  active, still working full-time, no night sweats, no fever, no chest pain, no shortness of breath, no abdominal pain, no diarrhea, no arthritis, no itching after hot showers.  Today hematocrit 48.1.     Past Medical History:  Polycythemia: Initially diagnosed May 2015 and started treatment with therapeutic phlebotomies at [facility] and transferred care closer to home at [facility] in July 2021. Has  been receiving therapeutic phlebotomies to maintain goal hematocrit <45%.      Hypertension, Vitamin D deficiency, Diabetes Mellitus, Non-ischemic cardiomyopathy, JULIETA, AV block, CAD, History of squamous cell carcinoma of skin, Osteoarthritis.      Social History:  He grows wine grapes and turf seed.        Review of Systems:   Review of Systems:    Constitutional: Feeling well, no fatigue or weakness. No fever, chills, night sweats.  Head and neck: No dizziness. Intermittent headaches.    HEENT: No sore throat or sinusitis.  Hearing is normal eyesight is good.  Cardiac: No chest pain or palpitations.  Pulmonary: no cough or shortness of breath.  GI: Appetite is good and weight stable.  No constipation or diarrhea.  No abdominal pain  Genitourinary: No frequency or urgency.  No polyuria or dysuria.  Musculocutaneous: History of osteoarthritis.  Endocrine: No thyroid disease. History of diabetes.   Skin: No rash or itching.  Neuromuscular : no fainting or dizziness.  No history of convulsions.  No tingling or numbness.            Allergies and Intolerances:       Allergies:         No Known Allergies: Drug, Unknown, Active     Outpatient Medication Profile:  * Patient Currently Takes Medications as of 25-Jul-2023 09:56 documented in Structured Notes         metFORMIN HCl - 1000 MG Oral Tablet : Last Dose Taken:  , TAKE 1 TABLET TWICE DAILY., Start Date: 02-Dec-2020         Chlorthalidone 25 MG Oral Tablet: Last Dose Taken:  , TAKE 1 TABLET DAILY.,  Start Date: 01-Aug-2019         losartan 25 mg oral tablet: Last Dose Taken:  , 1 tab(s) orally once  a day         Vitamin B Complex 100 injectable solution: Last Dose Taken:  , 1 cap(s)  orally once a day             Medical History:         Polycythemia vera: ICD-10: D45, Status: Active         Polycythemia vera: ICD-10: D45, Status: Active     Family History: No Family History items are recorded  in the problem list.      Social History:   Social Substance History:  ·  Smoking Status never smoker (1)   ·  Alcohol Use denies   ·  Drug Use denies            Vitals and Measurements:   Vitals: Temp: 35.6  HR: 50  RR: 16  BP: 156/75  SPO2%:   97   Measurements: HT(cm): 183  WT(kg): 93.7  BSA: 2.18   BMI:  27.9   Last 3 Weights & Heights: Date:                           Weight/Scale Type:                    Height:   25-Jul-2023 09:51                93.7  kg                     183  cm  30-May-2023 09:05                94.7  kg                     183  cm  28-Mar-2023 10:50                96.1  kg                     183  cm      Physical Exam:      Constitutional: awake/alert/oriented x3, no distress,   Eyes: PERRL, EOMI, clear sclera   ENMT: mucous membranes moist,   Head/Neck: Neck supple,  thyroid without mass or  tenderness, No JVD, trachea midline, no bruits   Respiratory/Thorax: Patent airways, CTAB, normal  breath sounds   Cardiovascular: Regular, rate and rhythm,   normal  S 1and S 2   Gastrointestinal: Nondistended, soft, non-tender,   no masses palpable, no organomegaly, +BS,    Musculoskeletal: ROM intact, no joint swelling, normal  strength   Extremities: normal extremities, no cyanosis edema,   no clubbing   Neurological: alert and oriented x3,   Lymphatic: No significant lymphadenopathy   Psychological: Appropriate mood and behavior   Skin: Warm and dry, no lesions,         Lab Results:     Component  Ref Range & Units 2 d ago 1 mo ago 3 mo ago 5 mo ago 7 mo ago 9 mo ago 11 mo ago   WBC  4.4 - 11.3 x10*3/uL 6.4 8.7 6.9 6.6 6.2 R 6.0 R 5.6 R   RBC  4.50 - 5.90 x10*6/uL 5.85 6.23 High  6.06 High  5.82 5.76 R 5.81 R 5.59 R   Hemoglobin  13.5 - 17.5 g/dL 16.7 17.2 16.9 16.3 16.3 16.4 16.0   Hematocrit  41.0 - 52.0 % 48.1 50.0 49.3 47.9 46.8 47.1 45.7   MCV  80 - 100 fL 82 80 81 82 81 81 82   MCH  26.0 - 34.0 pg 28.5 27.6 27.9 28.0      MCHC  32.0 - 36.0 g/dL 34.7 34.4 34.3 34.0 34.8 34.8 35.0   RDW  11.5 - 14.5 % 13.0 13.1 12.9 13.0 13.0 12.5 12.6   Platelets  150 - 450 x10*3/uL 183 197          Component  Ref Range & Units 2 d ago  (5/6/24) 1 mo ago  (3/11/24) 3 mo ago  (1/10/24) 5 mo ago  (11/14/23) 9 mo ago  (7/25/23) 1 yr ago  (3/28/23) 1 yr ago  (1/31/23)   Iron  35 - 150 ug/dL 78 73 78 91 64 72 55   UIBC  110 - 370 ug/dL 361 357 356 335      TIBC  240 - 445 ug/dL 439 430 434 426 431 430 397   % Saturation  25 - 45 % 18 Low  17 Low  18 Low          ·  Results             Assessment and Plan:      Assessment and Plan:   Assessment:    1. Polycythemia  Initially diagnosed 2015 and received treatment through [facility] with therapeutic phlebotomies. Transferred care closer to home at [facility]. Current treatment- therapeutic  phlebotomies every 2 months to maintain goal hematocrit <45%. Hematocrit today- 47.8%     2. Multiple medical conditions including HTN, JULIETA, DM, non-ischemic cardiomyopathy, etc.     Plan:  Patient will receive phlebotomy today for hematocrit 48.1%. Continue treatment  of polycythemia with every 2 month phlebotomies for goal hematocrit <45%. Return in  2 months for follow-up visit   .-Monitor CBC, CMP, iron studies.        Total time =30 minutes. 50% or more of this time was spent in counseling and/or coordination of care including reviewing medical history/radiology/labs, examining patient, formulating outlined plan  with team, and discussing plan with patient/family.  I reviewed patient's chart including but not limited to labs, imaging, surgical/procedure notes, pathology, hospital notes, doctor's notes.

## 2024-05-08 NOTE — PROGRESS NOTES
Patient identified by name & .   HGB 48.1; qualifies for phleb for HGB >45.   Patient denies acute distress- none noted  Phleb completed without deficits- patient discharged home in stable condition.   Patient to return in 7 weeks for labs and phleb same day.

## 2024-05-08 NOTE — PATIENT INSTRUCTIONS
Continue lab checks and phlebotomies every 2 months.     Follow up with Dr. Sequeira in 4 months.

## 2024-05-14 ASSESSMENT — PAIN SCALES - GENERAL: PAINLEVEL_OUTOF10: 0 - NO PAIN

## 2024-05-14 ASSESSMENT — PAIN - FUNCTIONAL ASSESSMENT: PAIN_FUNCTIONAL_ASSESSMENT: 0-10

## 2024-05-15 ENCOUNTER — APPOINTMENT (OUTPATIENT)
Dept: CARDIOLOGY | Facility: HOSPITAL | Age: 79
End: 2024-05-15
Payer: MEDICARE

## 2024-05-15 ENCOUNTER — APPOINTMENT (OUTPATIENT)
Dept: HEMATOLOGY/ONCOLOGY | Facility: CLINIC | Age: 79
End: 2024-05-15
Payer: MEDICARE

## 2024-05-15 ENCOUNTER — HOSPITAL ENCOUNTER (OUTPATIENT)
Facility: HOSPITAL | Age: 79
Discharge: HOME | End: 2024-05-16
Attending: INTERNAL MEDICINE | Admitting: INTERNAL MEDICINE
Payer: MEDICARE

## 2024-05-15 DIAGNOSIS — Z95.0 PRESENCE OF CARDIAC PACEMAKER: ICD-10-CM

## 2024-05-15 DIAGNOSIS — I45.3 BLOCK, TRIFASCICULAR: ICD-10-CM

## 2024-05-15 DIAGNOSIS — I44.0 FIRST DEGREE HEART BLOCK: ICD-10-CM

## 2024-05-15 DIAGNOSIS — R00.1 BRADYCARDIA: Primary | ICD-10-CM

## 2024-05-15 PROBLEM — I25.10 CAD (CORONARY ARTERY DISEASE): Status: ACTIVE | Noted: 2024-05-15

## 2024-05-15 PROCEDURE — 2550000001 HC RX 255 CONTRASTS: Performed by: INTERNAL MEDICINE

## 2024-05-15 PROCEDURE — 99152 MOD SED SAME PHYS/QHP 5/>YRS: CPT | Performed by: INTERNAL MEDICINE

## 2024-05-15 PROCEDURE — 93010 ELECTROCARDIOGRAM REPORT: CPT | Performed by: INTERNAL MEDICINE

## 2024-05-15 PROCEDURE — 2500000004 HC RX 250 GENERAL PHARMACY W/ HCPCS (ALT 636 FOR OP/ED): Performed by: INTERNAL MEDICINE

## 2024-05-15 PROCEDURE — 93005 ELECTROCARDIOGRAM TRACING: CPT | Mod: 59

## 2024-05-15 PROCEDURE — 2500000004 HC RX 250 GENERAL PHARMACY W/ HCPCS (ALT 636 FOR OP/ED): Performed by: NURSE PRACTITIONER

## 2024-05-15 PROCEDURE — G0378 HOSPITAL OBSERVATION PER HR: HCPCS

## 2024-05-15 PROCEDURE — C1892 INTRO/SHEATH,FIXED,PEEL-AWAY: HCPCS | Performed by: INTERNAL MEDICINE

## 2024-05-15 PROCEDURE — C1883 ADAPT/EXT, PACING/NEURO LEAD: HCPCS | Performed by: INTERNAL MEDICINE

## 2024-05-15 PROCEDURE — C1894 INTRO/SHEATH, NON-LASER: HCPCS | Performed by: INTERNAL MEDICINE

## 2024-05-15 PROCEDURE — 7100000010 HC PHASE TWO TIME - EACH INCREMENTAL 1 MINUTE: Performed by: INTERNAL MEDICINE

## 2024-05-15 PROCEDURE — 7100000009 HC PHASE TWO TIME - INITIAL BASE CHARGE: Performed by: INTERNAL MEDICINE

## 2024-05-15 PROCEDURE — 99153 MOD SED SAME PHYS/QHP EA: CPT | Performed by: INTERNAL MEDICINE

## 2024-05-15 PROCEDURE — 2500000005 HC RX 250 GENERAL PHARMACY W/O HCPCS: Performed by: INTERNAL MEDICINE

## 2024-05-15 PROCEDURE — 2780000003 HC OR 278 NO HCPCS: Performed by: INTERNAL MEDICINE

## 2024-05-15 PROCEDURE — 2500000001 HC RX 250 WO HCPCS SELF ADMINISTERED DRUGS (ALT 637 FOR MEDICARE OP): Performed by: NURSE PRACTITIONER

## 2024-05-15 PROCEDURE — 33208 INSRT HEART PM ATRIAL & VENT: CPT | Performed by: INTERNAL MEDICINE

## 2024-05-15 PROCEDURE — 2720000007 HC OR 272 NO HCPCS: Performed by: INTERNAL MEDICINE

## 2024-05-15 PROCEDURE — A4217 STERILE WATER/SALINE, 500 ML: HCPCS | Performed by: NURSE PRACTITIONER

## 2024-05-15 PROCEDURE — 7100000011 HC EXTENDED STAY RECOVERY HOURLY - NURSING UNIT

## 2024-05-15 PROCEDURE — C1785 PMKR, DUAL, RATE-RESP: HCPCS | Performed by: INTERNAL MEDICINE

## 2024-05-15 PROCEDURE — 2750000001 HC OR 275 NO HCPCS: Performed by: INTERNAL MEDICINE

## 2024-05-15 DEVICE — PULSE GENERATOR
Type: IMPLANTABLE DEVICE | Site: CHEST | Status: FUNCTIONAL
Brand: ASSURITY MRI™

## 2024-05-15 RX ORDER — LOSARTAN POTASSIUM 25 MG/1
25 TABLET ORAL DAILY
Status: DISCONTINUED | OUTPATIENT
Start: 2024-05-15 | End: 2024-05-16 | Stop reason: HOSPADM

## 2024-05-15 RX ORDER — ACETAMINOPHEN 160 MG/5ML
650 SOLUTION ORAL EVERY 4 HOURS PRN
Status: DISCONTINUED | OUTPATIENT
Start: 2024-05-15 | End: 2024-05-16 | Stop reason: HOSPADM

## 2024-05-15 RX ORDER — FENTANYL CITRATE 50 UG/ML
INJECTION, SOLUTION INTRAMUSCULAR; INTRAVENOUS AS NEEDED
Status: DISCONTINUED | OUTPATIENT
Start: 2024-05-15 | End: 2024-05-15 | Stop reason: HOSPADM

## 2024-05-15 RX ORDER — CHLORTHALIDONE 25 MG/1
25 TABLET ORAL DAILY
Status: DISCONTINUED | OUTPATIENT
Start: 2024-05-16 | End: 2024-05-16 | Stop reason: HOSPADM

## 2024-05-15 RX ORDER — CHLORHEXIDINE GLUCONATE 40 MG/ML
SOLUTION TOPICAL ONCE
Status: COMPLETED | OUTPATIENT
Start: 2024-05-15 | End: 2024-05-15

## 2024-05-15 RX ORDER — MIDAZOLAM HYDROCHLORIDE 1 MG/ML
INJECTION, SOLUTION INTRAMUSCULAR; INTRAVENOUS AS NEEDED
Status: DISCONTINUED | OUTPATIENT
Start: 2024-05-15 | End: 2024-05-15 | Stop reason: HOSPADM

## 2024-05-15 RX ORDER — ACETAMINOPHEN 325 MG/1
650 TABLET ORAL EVERY 4 HOURS PRN
Status: DISCONTINUED | OUTPATIENT
Start: 2024-05-15 | End: 2024-05-16 | Stop reason: HOSPADM

## 2024-05-15 RX ORDER — ACETAMINOPHEN 650 MG/1
650 SUPPOSITORY RECTAL EVERY 4 HOURS PRN
Status: DISCONTINUED | OUTPATIENT
Start: 2024-05-15 | End: 2024-05-16 | Stop reason: HOSPADM

## 2024-05-15 RX ORDER — NALOXONE HYDROCHLORIDE 0.4 MG/ML
0.2 INJECTION, SOLUTION INTRAMUSCULAR; INTRAVENOUS; SUBCUTANEOUS EVERY 5 MIN PRN
Status: DISCONTINUED | OUTPATIENT
Start: 2024-05-15 | End: 2024-05-16 | Stop reason: HOSPADM

## 2024-05-15 RX ORDER — ONDANSETRON 4 MG/1
4 TABLET, FILM COATED ORAL EVERY 8 HOURS PRN
Status: DISCONTINUED | OUTPATIENT
Start: 2024-05-15 | End: 2024-05-16 | Stop reason: HOSPADM

## 2024-05-15 RX ORDER — CEFAZOLIN SODIUM 2 G/100ML
2 INJECTION, SOLUTION INTRAVENOUS ONCE
Status: COMPLETED | OUTPATIENT
Start: 2024-05-15 | End: 2024-05-15

## 2024-05-15 RX ORDER — ATORVASTATIN CALCIUM 10 MG/1
10 TABLET, FILM COATED ORAL DAILY
Status: DISCONTINUED | OUTPATIENT
Start: 2024-05-15 | End: 2024-05-16 | Stop reason: HOSPADM

## 2024-05-15 RX ORDER — IODIXANOL 320 MG/ML
INJECTION, SOLUTION INTRAVASCULAR AS NEEDED
Status: DISCONTINUED | OUTPATIENT
Start: 2024-05-15 | End: 2024-05-15 | Stop reason: HOSPADM

## 2024-05-15 RX ORDER — ONDANSETRON HYDROCHLORIDE 2 MG/ML
4 INJECTION, SOLUTION INTRAVENOUS EVERY 8 HOURS PRN
Status: DISCONTINUED | OUTPATIENT
Start: 2024-05-15 | End: 2024-05-16 | Stop reason: HOSPADM

## 2024-05-15 RX ORDER — LANOLIN ALCOHOL/MO/W.PET/CERES
1000 CREAM (GRAM) TOPICAL DAILY
Status: DISCONTINUED | OUTPATIENT
Start: 2024-05-15 | End: 2024-05-16 | Stop reason: HOSPADM

## 2024-05-15 RX ORDER — LIDOCAINE HYDROCHLORIDE 20 MG/ML
INJECTION, SOLUTION INFILTRATION; PERINEURAL AS NEEDED
Status: DISCONTINUED | OUTPATIENT
Start: 2024-05-15 | End: 2024-05-15 | Stop reason: HOSPADM

## 2024-05-15 RX ORDER — AMLODIPINE BESYLATE 5 MG/1
5 TABLET ORAL
Status: DISCONTINUED | OUTPATIENT
Start: 2024-05-15 | End: 2024-05-16 | Stop reason: HOSPADM

## 2024-05-15 RX ORDER — SODIUM CHLORIDE 9 MG/ML
30 INJECTION, SOLUTION INTRAVENOUS CONTINUOUS
Status: DISCONTINUED | OUTPATIENT
Start: 2024-05-15 | End: 2024-05-15

## 2024-05-15 RX ADMIN — ATORVASTATIN CALCIUM 10 MG: 10 TABLET, FILM COATED ORAL at 20:35

## 2024-05-15 RX ADMIN — CEFAZOLIN SODIUM 2 G: 2 INJECTION, SOLUTION INTRAVENOUS at 07:51

## 2024-05-15 RX ADMIN — Medication: at 07:30

## 2024-05-15 RX ADMIN — SODIUM CHLORIDE 30 ML/HR: 9 INJECTION, SOLUTION INTRAVENOUS at 07:30

## 2024-05-15 RX ADMIN — ACETAMINOPHEN 650 MG: 325 TABLET ORAL at 20:34

## 2024-05-15 RX ADMIN — VANCOMYCIN HYDROCHLORIDE: 1 INJECTION, POWDER, LYOPHILIZED, FOR SOLUTION INTRAVENOUS at 09:05

## 2024-05-15 SDOH — SOCIAL STABILITY: SOCIAL INSECURITY: DOES ANYONE TRY TO KEEP YOU FROM HAVING/CONTACTING OTHER FRIENDS OR DOING THINGS OUTSIDE YOUR HOME?: NO

## 2024-05-15 SDOH — SOCIAL STABILITY: SOCIAL INSECURITY: ARE THERE ANY APPARENT SIGNS OF INJURIES/BEHAVIORS THAT COULD BE RELATED TO ABUSE/NEGLECT?: NO

## 2024-05-15 SDOH — SOCIAL STABILITY: SOCIAL INSECURITY: HAVE YOU HAD ANY THOUGHTS OF HARMING ANYONE ELSE?: NO

## 2024-05-15 SDOH — SOCIAL STABILITY: SOCIAL INSECURITY: DO YOU FEEL UNSAFE GOING BACK TO THE PLACE WHERE YOU ARE LIVING?: NO

## 2024-05-15 SDOH — SOCIAL STABILITY: SOCIAL INSECURITY: ABUSE: ADULT

## 2024-05-15 SDOH — SOCIAL STABILITY: SOCIAL INSECURITY: ARE YOU OR HAVE YOU BEEN THREATENED OR ABUSED PHYSICALLY, EMOTIONALLY, OR SEXUALLY BY ANYONE?: NO

## 2024-05-15 SDOH — SOCIAL STABILITY: SOCIAL INSECURITY: HAS ANYONE EVER THREATENED TO HURT YOUR FAMILY OR YOUR PETS?: NO

## 2024-05-15 SDOH — SOCIAL STABILITY: SOCIAL INSECURITY: DO YOU FEEL ANYONE HAS EXPLOITED OR TAKEN ADVANTAGE OF YOU FINANCIALLY OR OF YOUR PERSONAL PROPERTY?: NO

## 2024-05-15 SDOH — SOCIAL STABILITY: SOCIAL INSECURITY: HAVE YOU HAD THOUGHTS OF HARMING ANYONE ELSE?: NO

## 2024-05-15 SDOH — SOCIAL STABILITY: SOCIAL INSECURITY: WERE YOU ABLE TO COMPLETE ALL THE BEHAVIORAL HEALTH SCREENINGS?: YES

## 2024-05-15 ASSESSMENT — PAIN SCALES - GENERAL
PAINLEVEL_OUTOF10: 0 - NO PAIN
PAINLEVEL_OUTOF10: 6
PAINLEVEL_OUTOF10: 0 - NO PAIN
PAINLEVEL_OUTOF10: 0 - NO PAIN

## 2024-05-15 ASSESSMENT — COGNITIVE AND FUNCTIONAL STATUS - GENERAL
PATIENT BASELINE BEDBOUND: NO
MOBILITY SCORE: 24
PATIENT BASELINE BEDBOUND: NO
MOBILITY SCORE: 24
DAILY ACTIVITIY SCORE: 24
DAILY ACTIVITIY SCORE: 24

## 2024-05-15 ASSESSMENT — PATIENT HEALTH QUESTIONNAIRE - PHQ9
1. LITTLE INTEREST OR PLEASURE IN DOING THINGS: NOT AT ALL
2. FEELING DOWN, DEPRESSED OR HOPELESS: NOT AT ALL
SUM OF ALL RESPONSES TO PHQ9 QUESTIONS 1 & 2: 0

## 2024-05-15 ASSESSMENT — COLUMBIA-SUICIDE SEVERITY RATING SCALE - C-SSRS
2. HAVE YOU ACTUALLY HAD ANY THOUGHTS OF KILLING YOURSELF?: NO
6. HAVE YOU EVER DONE ANYTHING, STARTED TO DO ANYTHING, OR PREPARED TO DO ANYTHING TO END YOUR LIFE?: NO
1. IN THE PAST MONTH, HAVE YOU WISHED YOU WERE DEAD OR WISHED YOU COULD GO TO SLEEP AND NOT WAKE UP?: NO

## 2024-05-15 ASSESSMENT — PAIN - FUNCTIONAL ASSESSMENT
PAIN_FUNCTIONAL_ASSESSMENT: 0-10

## 2024-05-15 ASSESSMENT — LIFESTYLE VARIABLES
HOW OFTEN DO YOU HAVE 6 OR MORE DRINKS ON ONE OCCASION: NEVER
HOW OFTEN DO YOU HAVE A DRINK CONTAINING ALCOHOL: NEVER
AUDIT-C TOTAL SCORE: 0
SKIP TO QUESTIONS 9-10: 1
HOW MANY STANDARD DRINKS CONTAINING ALCOHOL DO YOU HAVE ON A TYPICAL DAY: PATIENT DOES NOT DRINK
AUDIT-C TOTAL SCORE: 0

## 2024-05-15 ASSESSMENT — ACTIVITIES OF DAILY LIVING (ADL)
ADEQUATE_TO_COMPLETE_ADL: YES
BATHING: INDEPENDENT
FEEDING YOURSELF: INDEPENDENT
LACK_OF_TRANSPORTATION: NO
WALKS IN HOME: INDEPENDENT
DRESSING YOURSELF: INDEPENDENT
TOILETING: INDEPENDENT
HEARING - RIGHT EAR: FUNCTIONAL
JUDGMENT_ADEQUATE_SAFELY_COMPLETE_DAILY_ACTIVITIES: YES
GROOMING: INDEPENDENT
HEARING - LEFT EAR: FUNCTIONAL
PATIENT'S MEMORY ADEQUATE TO SAFELY COMPLETE DAILY ACTIVITIES?: YES

## 2024-05-15 NOTE — DISCHARGE INSTRUCTIONS
SEE the BLUE handout in your folder.    You will have a Check on your pacemaker or defibrillator at the Device [facility]. You will check in at the cardiology department at [facility]. You will come in through the main lobby and follow signs for Cardiology.     Pacemaker Insertion Discharge Instructions    About this topic  A pacemaker helps your heart to beat properly. A pacemaker is used when the heart does not beat normally. This is called an arrhythmia. Your heart may be beating too fast or too slow. Also, your heart may beat with an irregular rhythm. Any of these arrhythmias can affect your health if not treated right away.  The pacemaker is placed under the skin of your chest, just below your collarbone. Leads or wires are attached to the pacemaker. The leads will be hooked up to your heart to help control your heartbeat. Pacemakers work in many ways. Your doctor will decide which settings your pacemaker will need. Some send an electric pulse for each heartbeat. Others only send an electric pulse if the heart rate is too high or too low. A pacemaker is made up of two parts:  Pulse generator ? Houses the battery and a small computer that records the heartbeat  Lead wires ? Send the electric pulses from the generator to the heart  Your doctor may place leads in one part of the heart or in more than one place, based on the cause of your rhythm problem. Talk to your doctor about where they will place your leads.    What care is needed at home?  Ask your doctor what you need to do when you go home. Make sure you ask questions if you do not understand what the doctor says. This way you will know what you need to do.  Talk to your doctor about how to care for your cut site. Ask your doctor about:  When you should change your bandages- Remove your dressing 5-7 days after your procedure   How to care for your cut sites-once you remove your dressing you will see your incision that will be covered with  steri-strips. Do not remove the strips, let them fall off on their own. You will not have any sutures that need removed. They will dissolve on their own. You can clean your incision with mild soap and water, rinse and dry with clean towel after your shower. Do not submerge your incision under water. Ask your device nurse when it is safe to do that at your device check after your procedure.   When you may take a bath or shower-It is okay to shower 3 days after your procedure   If you need to limit your arm movement or wear a sling on the side where the device was placed-you do not have to wear a sling but you may find that it reminds you to not lift your arm above your shoulder. Keep your elbow below the level of your shoulder for 6 weeks   If you should learn to take your heart rate and blood pressure-not routinely needed   To help others in case of an emergency:  Wear a disease medical alert ID. This will let other people know that you have a pacemaker.  Always carry your medical card. This card has information about your pacemaker. It will be able to let other people know what to do in an emergency.  Be careful around electrical devices or anything with magnets.  Talk with your doctor about which electric or magnetic sources or equipment are safe to be around. Learn which ones you must avoid.  Take extra care with cell phones and devices like smart watches. They may have a magnet that can affect your device. To be safe, keep these things away from your pacemaker. Carry them in a pocket or bag below your waist. Do not sleep with them on your bed.  Most appliances in your home are safe for you to use.  Check with your doctor about metal detectors that you walk through. You may be able to walk through them at a normal pace. Your pacemaker will likely set it off. Show your medical alert ID or card. You can also ask to be searched by hand.  Stay at least 2 feet (0.6 meters) away from industrial welders, large motors,  electrical generators, and equipment.  Tell your other doctors about your pacemaker before having any other tests, like an MRI, or procedures.  What follow-up care is needed?  Your doctor may ask you to make visits to the office to check on your progress. Be sure to keep these visits. Your doctor may order you to have an ECG (electrocardiogram) to check electrical pulses of your heart. Your doctor will also want to check:  Your pacemaker. This is to make sure it is working properly.  The pacemaker batteries. The batteries will need to be replaced before they start to run down. The batteries may last for years, based on how much your device is used. Leads or wires may also need to be replaced over time.  We used skin glue, the glue will fall off on its own. You will not need any sutures removed.     Will physical activity be limited?  You may have to limit your activity until the cut site is healed and the wires are securely in place. Talk to your doctor about:  The right amount of activity for you. Ask when you may begin light sports and workouts or other tiring activities. Do not play full contact sports such as football. This could damage your pacemaker or may loosen the wires connected to your heart.  How far you may raise your arm on the side of your body with the device. There will be a time when you are not allowed to reach over your head, out to the side, or do stretching on the side of your body with the device. You may be asked to wear a sling.  How much weight you may lift.  What problems could happen?  Bleeding  Damage to a blood vessel or nerve  A collapsed lung  Infection  Device may not work the right way or becomes dislodged  When do I need to call the doctor?  Signs of wound infection. These include swelling, redness, warmth around the wound; too much pain when touched; yellowish, greenish, or bloody discharge; foul smell coming from the cut site; cut site opens up.  The same signs you had before  your pacemaker was placed  Dizziness or shortness of breath. Call for emergency help right away.  Chest pain. Call for emergency help right away.  Hiccups that do not go away  Fainting or passing out. Call for emergency help right away.  You are not feeling better in 2 to 3 days or you are feeling worse  Helpful tips  Be to keep your ID card with your pacemaker information in a safe place. It will include the , serial number, and date the implant was put in. Also write down your pacemaker settings on this card. This information is helpful in case of an emergency.  Teach Back: Helping You Understand  The Teach Back Method helps you understand the information we are giving you. After you talk with the staff, tell them in your own words what you learned. This helps to make sure the staff has described each thing clearly. It also helps to explain things that may have been confusing. Before going home, make sure you can do these:  I can tell you about my procedure.  I can tell you how to care for my cut site.  I can tell you how I need to be careful around electrical devices and magnets with my pacemaker.  I can tell you what signs will make me call for emergency help.  If you have any questions about the instructions you received please call the NP Vangie Jones at [phone] or Dr. Lozada's office at [phone],

## 2024-05-15 NOTE — Clinical Note
Discharge orders noted and given to pt including follow up and med instruction.  Verbalized understanding.  Copy of avs form given to pt.  All personal belongings returned to pt. See signed pt belongings form in chart confirming pt received belongings.  Pt denies si, hi or  a v hallucinations upon dc from [facility] [place]. No c/o voiced.  Pt's transportation per [facility] here to transport pt home.  Pt dc'd home ambulatory in [facility] with belongings at side.  Escorted downstairs to transport van per mht in nad.    There were no immediate complications during the procedure.

## 2024-05-15 NOTE — Clinical Note
The PACEMAKER, GENERATOR, DUAL ASSURITY MRI - [id number] device was inserted. The leads were placed into the connector and visually verified to be in correct position. Injury current obtained.

## 2024-05-16 ENCOUNTER — HOSPITAL ENCOUNTER (OUTPATIENT)
Dept: CARDIOLOGY | Facility: HOSPITAL | Age: 79
Discharge: HOME | End: 2024-05-16
Payer: MEDICARE

## 2024-05-16 ENCOUNTER — APPOINTMENT (OUTPATIENT)
Dept: RADIOLOGY | Facility: HOSPITAL | Age: 79
End: 2024-05-16
Payer: MEDICARE

## 2024-05-16 VITALS
WEIGHT: 205.69 LBS | RESPIRATION RATE: 18 BRPM | HEIGHT: 72 IN | OXYGEN SATURATION: 94 % | TEMPERATURE: 97.7 F | DIASTOLIC BLOOD PRESSURE: 81 MMHG | SYSTOLIC BLOOD PRESSURE: 134 MMHG | HEART RATE: 66 BPM | BODY MASS INDEX: 27.86 KG/M2

## 2024-05-16 PROBLEM — R00.1 BRADYCARDIA: Status: RESOLVED | Noted: 2024-04-01 | Resolved: 2024-05-16

## 2024-05-16 PROBLEM — I45.3 BLOCK, TRIFASCICULAR: Status: RESOLVED | Noted: 2024-04-01 | Resolved: 2024-05-16

## 2024-05-16 LAB
ANION GAP SERPL CALC-SCNC: 13 MMOL/L (ref 10–20)
APTT PPP: 34 SECONDS (ref 27–38)
ATRIAL RATE: 49 BPM
ATRIAL RATE: 65 BPM
BUN SERPL-MCNC: 17 MG/DL (ref 6–23)
CALCIUM SERPL-MCNC: 9.2 MG/DL (ref 8.6–10.3)
CHLORIDE SERPL-SCNC: 100 MMOL/L (ref 98–107)
CO2 SERPL-SCNC: 26 MMOL/L (ref 21–32)
CREAT SERPL-MCNC: 0.82 MG/DL (ref 0.5–1.3)
EGFRCR SERPLBLD CKD-EPI 2021: 90 ML/MIN/1.73M*2
ERYTHROCYTE [DISTWIDTH] IN BLOOD BY AUTOMATED COUNT: 12.8 % (ref 11.5–14.5)
GLUCOSE SERPL-MCNC: 152 MG/DL (ref 74–99)
HCT VFR BLD AUTO: 46.2 % (ref 41–52)
HGB BLD-MCNC: 16.5 G/DL (ref 13.5–17.5)
INR PPP: 1 (ref 0.9–1.1)
MCH RBC QN AUTO: 28.2 PG (ref 26–34)
MCHC RBC AUTO-ENTMCNC: 35.7 G/DL (ref 32–36)
MCV RBC AUTO: 79 FL (ref 80–100)
NRBC BLD-RTO: 0 /100 WBCS (ref 0–0)
P AXIS: 42 DEGREES
P AXIS: 67 DEGREES
P OFFSET: 112 MS
P OFFSET: 142 MS
P ONSET: 54 MS
P ONSET: 80 MS
PLATELET # BLD AUTO: 181 X10*3/UL (ref 150–450)
POTASSIUM SERPL-SCNC: 3.6 MMOL/L (ref 3.5–5.3)
PR INTERVAL: 214 MS
PR INTERVAL: 296 MS
PROTHROMBIN TIME: 11.7 SECONDS (ref 9.8–12.8)
Q ONSET: 187 MS
Q ONSET: 202 MS
QRS COUNT: 11 BEATS
QRS COUNT: 8 BEATS
QRS DURATION: 180 MS
QRS DURATION: 188 MS
QT INTERVAL: 474 MS
QT INTERVAL: 476 MS
QTC CALCULATION(BAZETT): 429 MS
QTC CALCULATION(BAZETT): 492 MS
QTC FREDERICIA: 445 MS
QTC FREDERICIA: 486 MS
R AXIS: -59 DEGREES
R AXIS: -88 DEGREES
RBC # BLD AUTO: 5.85 X10*6/UL (ref 4.5–5.9)
SODIUM SERPL-SCNC: 135 MMOL/L (ref 136–145)
T AXIS: -37 DEGREES
T AXIS: 23 DEGREES
T OFFSET: 424 MS
T OFFSET: 440 MS
VENTRICULAR RATE: 49 BPM
VENTRICULAR RATE: 65 BPM
WBC # BLD AUTO: 8 X10*3/UL (ref 4.4–11.3)

## 2024-05-16 PROCEDURE — 93005 ELECTROCARDIOGRAM TRACING: CPT

## 2024-05-16 PROCEDURE — 71046 X-RAY EXAM CHEST 2 VIEWS: CPT | Performed by: RADIOLOGY

## 2024-05-16 PROCEDURE — 99239 HOSP IP/OBS DSCHRG MGMT >30: CPT | Performed by: NURSE PRACTITIONER

## 2024-05-16 PROCEDURE — 93010 ELECTROCARDIOGRAM REPORT: CPT | Performed by: INTERNAL MEDICINE

## 2024-05-16 PROCEDURE — 85027 COMPLETE CBC AUTOMATED: CPT | Performed by: NURSE PRACTITIONER

## 2024-05-16 PROCEDURE — 36415 COLL VENOUS BLD VENIPUNCTURE: CPT | Performed by: NURSE PRACTITIONER

## 2024-05-16 PROCEDURE — 71046 X-RAY EXAM CHEST 2 VIEWS: CPT

## 2024-05-16 PROCEDURE — 36416 COLLJ CAPILLARY BLOOD SPEC: CPT | Performed by: NURSE PRACTITIONER

## 2024-05-16 PROCEDURE — 2500000001 HC RX 250 WO HCPCS SELF ADMINISTERED DRUGS (ALT 637 FOR MEDICARE OP): Performed by: NURSE PRACTITIONER

## 2024-05-16 PROCEDURE — 80048 BASIC METABOLIC PNL TOTAL CA: CPT | Performed by: NURSE PRACTITIONER

## 2024-05-16 PROCEDURE — 7100000011 HC EXTENDED STAY RECOVERY HOURLY - NURSING UNIT

## 2024-05-16 PROCEDURE — 85610 PROTHROMBIN TIME: CPT | Performed by: NURSE PRACTITIONER

## 2024-05-16 RX ORDER — ACETAMINOPHEN AND CODEINE PHOSPHATE 300; 30 MG/1; MG/1
1 TABLET ORAL EVERY 4 HOURS PRN
Status: DISCONTINUED | OUTPATIENT
Start: 2024-05-16 | End: 2024-05-16 | Stop reason: HOSPADM

## 2024-05-16 RX ADMIN — CHLORTHALIDONE 25 MG: 25 TABLET ORAL at 09:07

## 2024-05-16 RX ADMIN — Medication 1000 MCG: at 09:07

## 2024-05-16 RX ADMIN — LOSARTAN POTASSIUM 25 MG: 25 TABLET, FILM COATED ORAL at 09:07

## 2024-05-16 ASSESSMENT — ENCOUNTER SYMPTOMS
SHORTNESS OF BREATH: 0
DYSPNEA ON EXERTION: 0
MEMORY LOSS: 0
CONSTITUTIONAL NEGATIVE: 1
PALPITATIONS: 0
GASTROINTESTINAL NEGATIVE: 1
FOCAL WEAKNESS: 0
DECREASED APPETITE: 0
RESPIRATORY NEGATIVE: 1
BLURRED VISION: 0
LIGHT-HEADEDNESS: 0
COUGH: 0
DEPRESSION: 0
ORTHOPNEA: 0
IRREGULAR HEARTBEAT: 0
SYNCOPE: 0

## 2024-05-16 ASSESSMENT — PAIN - FUNCTIONAL ASSESSMENT: PAIN_FUNCTIONAL_ASSESSMENT: 0-10

## 2024-05-16 ASSESSMENT — PAIN SCALES - GENERAL: PAINLEVEL_OUTOF10: 0 - NO PAIN

## 2024-05-16 NOTE — DISCHARGE SUMMARY
CARDIOLOGY DISCHARGE SUMMARY  Discharge Diagnosis  Bradycardia    HPI:  Domo Kelley is a 78 y.o. year old male patient with     Aortic root dilation: Echocardiogram February 2024 showed mild dilation of ascending aorta 3.8cm and aortic root  Hypertension  JULIETA  NICM  Diabetes  Bradycardia with conduction system disease: We originally saw patient in 2019 with asymptomatic bradycardia at that time, noted on monitor which also noted sinus pause of 2.4 seconds. No episodes of AV block noted. ECG at that time was sinus with RBBB, LAFB, and prolonged MN which placed him at risk of developing high degree AV block. At that time no indication for permanent pacing.        Patient now referred back as he has experienced feeling of faintness, lasting 1-2 minutes.      Patient here for follow up for bradycardia. He reports he was feeling episodes of lightheadedness and fogginess. Episodes occur with exertion and at rest. He has experienced near syncope a few times with exertion. He is compliant with daily medications and is on no rate slowing medications.     Subjective Data:  Had dual chamber PPM implanted yesterday without event.  Pocket looks good.  CXR reviewed and no pneumothorax. Device check with normal function.  K 3.6, creatinine 0.82.  Monitor shows SR with Vpacing currently.    No CP/dyspnea/lightheadedness.     Last Labs:  Results from last 7 days   Lab Units 05/16/24  0406   SODIUM mmol/L 135*   POTASSIUM mmol/L 3.6   CHLORIDE mmol/L 100   CO2 mmol/L 26   BUN mg/dL 17   CREATININE mg/dL 0.82   GLUCOSE mg/dL 152*   CALCIUM mg/dL 9.2     Results from last 7 days   Lab Units 05/16/24  0406   WBC AUTO x10*3/uL 8.0   HEMOGLOBIN g/dL 16.5   HEMATOCRIT % 46.2   PLATELETS AUTO x10*3/uL 181         Hospital Course  Had dual chamber PPM implanted yesterday without event.  Pocket looks good.  CXR reviewed and no pneumothorax. Device check with normal function.    Review of Systems   Constitutional: Negative. Negative for  decreased appetite and malaise/fatigue.   HENT: Negative.     Eyes:  Negative for blurred vision and visual disturbance.   Cardiovascular:  Negative for chest pain, dyspnea on exertion, irregular heartbeat, leg swelling, orthopnea, palpitations and syncope.   Respiratory: Negative.  Negative for cough and shortness of breath.    Musculoskeletal:  Negative for arthritis.   Gastrointestinal: Negative.    Neurological:  Negative for focal weakness and light-headedness.   Psychiatric/Behavioral:  Negative for depression and memory loss.         Pertinent Physical Exam At Time of Discharge  Physical Exam  Constitutional:       Appearance: Normal appearance.   HENT:      Head: Normocephalic.   Eyes:      Conjunctiva/sclera: Conjunctivae normal.   Cardiovascular:      Rate and Rhythm: Normal rate and regular rhythm.      Pulses: Normal pulses.      Heart sounds: S1 normal and S2 normal. No murmur heard.     No friction rub. No gallop.      Comments: L chest with pacer pocket, no swelling or hematoma.  Occlusive dressing intact.   Pulmonary:      Effort: Pulmonary effort is normal.      Breath sounds: Normal breath sounds.   Abdominal:      General: Bowel sounds are normal.      Palpations: Abdomen is soft.      Tenderness: There is no abdominal tenderness.   Musculoskeletal:      Cervical back: Neck supple.      Right lower leg: No edema.      Left lower leg: No edema.   Skin:     General: Skin is warm and dry.   Neurological:      General: No focal deficit present.      Mental Status: He is alert and oriented to person, place, and time.   Psychiatric:         Attention and Perception: Attention normal.         Mood and Affect: Mood normal.          ASSESSMENT/PLAN  Trifascicular block/bradycardia: Patient is now symptomatic with severe conduction system disease. He would benefits from pacemaker implant. Risks and benefits discussed. Patient is agreeable to pacemaker implant.      Schedule dual chamber pacemaker  We will  follow up 2 weeks post implant  5-16-24: No issues overnight.  Reviewed restrictions/post procedure instructions with patient. He also has printed material for d/c.  Will arrange outpt follow up with device clinic and with EP service.  EKG reviewed and shows SR with V pacing, 1st deg AVB, Qtc 492 ms.  Patient is ready for d/c     Total d/c time took > 35 minutes.     Home Medications     Medication List      CONTINUE taking these medications     FreeStyle Carmela 3 Sensor device; Generic drug: blood-glucose sensor; Use   1 sensor q 14 days     ASK your doctor about these medications     amLODIPine 5 mg tablet; Commonly known as: Norvasc   atorvastatin 10 mg tablet; Commonly known as: Lipitor; Take 1 tablet (10   mg) by mouth once daily.   BERBERINE-HERBAL COMB NO.18 ORAL   chlorthalidone 25 mg tablet; Commonly known as: [place]gro[place]; TAKE 1 TABLET   BY MOUTH EVERY DAY   cyanocobalamin 1,000 mcg tablet; Commonly known as: Vitamin B-12   fluticasone 50 mcg/actuation nasal spray; Commonly known as: Flonase;   Administer 1 spray into each nostril once daily. Shake gently. Before   first use, prime pump. After use, clean tip and replace cap.   losartan 25 mg tablet; Commonly known as: Cozaar; TAKE 1 TABLET BY MOUTH   EVERY DAY   metFORMIN  mg 24 hr tablet; Commonly known as: Glucophage-XR; TAKE   2 TABLETS BY MOUTH TWICE A DAY   Trulicity 0.75 mg/0.5 mL pen injector; Generic drug: dulaglutide; Inject   0.75 mg under the skin 1 (one) time per week.       Outpatient Follow-Up  Future Appointments   Date Time Provider Department Center   6/10/2024  2:00 PM LORRI Gonzalez, NATALYA-A ARIC [facility]   6/26/2024  2:30 PM INF 04 [place] [id number] [facility]   7/1/2024  2:30 PM [place] [facility] [place]   9/5/2024  2:00 PM Chris Kelsey DO [id number] [facility]   9/6/2024 10:30 AM Marycruz Pedro, APRN-CNP [id number] [facility]   9/10/2024 11:30 AM Rasta Sequeira MD [id number] [facility]        Shereen LOPEZ  JACKLYN Yan-CNP  5/16/2024  9:36 AM

## 2024-05-16 NOTE — CARE PLAN
The clinical goals for the shift include Pt will be hemodynamically stable.     Problem: Pain  Goal: My pain/discomfort is manageable  Outcome: Progressing     Problem: Daily Care  Goal: Daily care needs are met  Outcome: Progressing     Problem: Psychosocial Needs  Goal: Demonstrates ability to cope with hospitalization/illness  Outcome: Progressing  Goal: Collaborate with me, my family, and caregiver to identify my specific goals  Outcome: Progressing     Problem: Discharge Barriers  Goal: My discharge needs are met  Outcome: Progressing

## 2024-05-24 ENCOUNTER — TELEPHONE (OUTPATIENT)
Dept: CARDIOLOGY | Facility: CLINIC | Age: 79
End: 2024-05-24
Payer: MEDICARE

## 2024-05-24 NOTE — TELEPHONE ENCOUNTER
----- Message from Shelli Walker RN sent at 5/16/2024  8:47 AM EDT -----  Regarding: AG patient ? pacer yet  Vesta sent this reminder to herself:    Pacemaker placed? When placed needs to start on Carvedilol for VT on holter.    Is this something EP team can help with?    ----- Message -----  From: Vesta Greco RN  Sent: 4/10/2024  12:00 AM EDT  To: Vesta Greco RN  Subject: Pacemaker placed? When placed needs to start#

## 2024-05-30 NOTE — PROGRESS NOTES
Electrophysiology Follow up Visit    Domo Kelley is a 78 y.o. year old male patient with    Aortic root dilation: Echocardiogram February 2024 showed mild dilation of ascending aorta 3.8cm and aortic root  Hypertension  JULIETA  NICM  Diabetes  Bradycardia with conduction system disease: We originally saw patient in 2019 with asymptomatic bradycardia at that time, noted on monitor which also noted sinus pause of 2.4 seconds. No episodes of AV block noted. ECG at that time was sinus with RBBB, LAFB, and prolonged NV which placed him at risk of developing high degree AV block. At that time no indication for permanent pacing.        Patient now referred back as he has experienced feeling of faintness, lasting 1-2 minutes.      We saw patient in April 2024.  He had trifascicular block and developed symptoms including lightheadedness, fogginess and near syncope.  He was not on any rate slowing medications.  Agreeable to pacemaker implant.    5/15/2024 successful implant of Abbott/St Gurwinder dual-chamber pacemaker.    Patient here for follow-up post pacemaker implant. He denies any further fogginess or near syncope. He has adhered to activity restrictions.      Medical History  He has a past medical history of Abnormal result of other cardiovascular function study (09/16/2019), Anxiety disorder, unspecified, Aortic root dilation (CMS-HCC), Bradycardia, Coronary angioplasty status (10/04/2019), Encounter for screening for malignant neoplasm of colon (10/10/2017), Hyperlipidemia, Hypertension, JULIETA (obstructive sleep apnea), Personal history of other diseases of the circulatory system, Personal history of other diseases of the circulatory system (01/06/2020), Personal history of other diseases of the musculoskeletal system and connective tissue, Personal history of other diseases of the respiratory system, Personal history of other malignant neoplasm of skin, Personal history of other specified conditions (07/20/2019), and  Shortness of breath (10/07/2019).    Social History  He reports that he has never smoked. He has never used smokeless tobacco. He reports current alcohol use. He reports that he does not use drugs.      FamHx:   Family History   Problem Relation Name Age of Onset    Breast cancer Mother      Skin cancer Father         Allergies   Allergen Reactions    Lisinopril Cough    Tetanus Vaccines And Toxoid Unknown        Outpatient Medications:  Current Outpatient Medications   Medication Instructions    amLODIPine (NORVASC) 5 mg, oral, Daily RT    atorvastatin (LIPITOR) 10 mg, oral, Daily    berberine/herbal complex no.18 (BERBERINE-HERBAL COMB NO.18 ORAL) 1,000 mg, oral, Daily    blood-glucose sensor (FreeStyle Carmela 3 Sensor) device Use 1 sensor q 14 days    chlorthalidone (HYGROTON) 25 mg, oral, Daily    cyanocobalamin (Vitamin B-12) 1,000 mcg tablet 1 tablet, oral, Daily    losartan (COZAAR) 25 mg, oral, Daily    metFORMIN XR (Glucophage-XR) 500 mg 24 hr tablet TAKE 2 TABLETS BY MOUTH TWICE A DAY    Trulicity 0.75 mg, subcutaneous, Once Weekly         Last Recorded Vitals: .vital      5/15/2024     2:32 PM 5/15/2024     2:47 PM 5/15/2024     7:49 PM 5/15/2024    11:46 PM 5/16/2024     3:39 AM 5/16/2024     7:37 AM 5/31/2024     1:37 PM   Vitals   Systolic 151 156 129 118 131 134 134   Diastolic 104 79 79 77 87 81 70   Heart Rate 73 80 61 68 74 66 68   Temp   37.2 °C (99 °F) 37.4 °C (99.3 °F) 36.4 °C (97.6 °F) 36.5 °C (97.7 °F)    Resp 16 16 18 18 18 18    Height (in)       1.829 m (6')   Weight (lb)     205.69  205   BMI     27.9 kg/m2  27.8 kg/m2   BSA (m2)     2.18 m2  2.17 m2   Visit Report       Report    Visit Vitals  /70   Pulse 68   Ht 1.829 m (6')   Wt 93 kg (205 lb)   BMI 27.80 kg/m²   Smoking Status Never   BSA 2.17 m²        Physical Exam  Constitutional:       Appearance: Normal appearance.   Eyes:      Pupils: Pupils are equal, round, and reactive to light.   Cardiovascular:      Rate and Rhythm:  Normal rate and regular rhythm.      Heart sounds: Normal heart sounds.   Pulmonary:      Effort: Pulmonary effort is normal.      Breath sounds: Normal breath sounds.   Musculoskeletal:         General: Normal range of motion.   Skin:     General: Skin is warm and dry.   Neurological:      Mental Status: He is alert and oriented to person, place, and time.        Cardiac Testing Reviewed Study(s):  ECG  4/1/2024 sinus bradycardia with rate of 54 with prolonged OR 316ms, RBBB, LAFB  5/31/2024 sinus rhythm with rate of 68, prolonged OR, RBBB, LAFB     Echocardiogram February 2024  1. Left ventricular systolic function is normal.  2. Mild dilatation of the ascending aorta (3.68 cm)  3. Mild dilatation of the aortic root      Echocardiogram June 2023  1. Left ventricular systolic function is normal with a 60-65% estimated ejection fraction.  2. There is moderate dilatation of the aortic root.    Lab Results   Component Value Date    WBC 8.0 05/16/2024    HGB 16.5 05/16/2024    HCT 46.2 05/16/2024     05/16/2024    ALT 11 05/06/2024    AST 12 05/06/2024     (L) 05/16/2024    K 3.6 05/16/2024     05/16/2024    CREATININE 0.82 05/16/2024    BUN 17 05/16/2024    CO2 26 05/16/2024    TSH 2.32 01/19/2022    INR 1.0 05/16/2024       Assessment  Trifascicular block s/p Jerome/StJude dual chamber pacemaker. ECG sinus rhythm with prolonged OR, RBBB, LAFB. Left chest incision is healing well with no hematoma, erythema, tenderness, or drainage. We reviewed activity restrictions that will adhere for the next few weeks. He is scheduled with device clinic next month. We will continue to follow patient through device clinic. He will continue to follow with general cardiology as scheduled.    Plan  Continue current medications  Follow up with device clinic as scheduled  Continue to follow up with general cardiology as scheduled      Exclusive of any other services or procedures performed, Corina PORTER,  APRN-CNP , spent 40 minutes in duration for this visit today.  This time consisted of chart review, obtaining history, and/or performing the exam as documented above as well as documenting the clinical information for the encounter in the electronic record, discussing treatment options, plans, and/or goals with patient, family, and/or caregiver, refilling medications, updating the electronic record, ordering medicines, lab work, imaging, referrals, and/or procedures as documented above and communicating with other [facility] professionals. I have discussed the results of laboratory, radiology, and cardiology studies with the patient and their family/caregiver.

## 2024-05-31 ENCOUNTER — OFFICE VISIT (OUTPATIENT)
Dept: CARDIOLOGY | Facility: CLINIC | Age: 79
End: 2024-05-31
Payer: MEDICARE

## 2024-05-31 VITALS
HEIGHT: 72 IN | WEIGHT: 205 LBS | SYSTOLIC BLOOD PRESSURE: 134 MMHG | HEART RATE: 68 BPM | DIASTOLIC BLOOD PRESSURE: 70 MMHG | BODY MASS INDEX: 27.77 KG/M2

## 2024-05-31 DIAGNOSIS — Z95.0 PRESENCE OF CARDIAC PACEMAKER: Primary | ICD-10-CM

## 2024-05-31 DIAGNOSIS — I45.3 TRIFASCICULAR BUNDLE BRANCH BLOCK: ICD-10-CM

## 2024-05-31 PROCEDURE — 99024 POSTOP FOLLOW-UP VISIT: CPT | Performed by: NURSE PRACTITIONER

## 2024-05-31 PROCEDURE — 1159F MED LIST DOCD IN RCRD: CPT | Performed by: NURSE PRACTITIONER

## 2024-05-31 PROCEDURE — 3078F DIAST BP <80 MM HG: CPT | Performed by: NURSE PRACTITIONER

## 2024-05-31 PROCEDURE — 3075F SYST BP GE 130 - 139MM HG: CPT | Performed by: NURSE PRACTITIONER

## 2024-05-31 PROCEDURE — 1123F ACP DISCUSS/DSCN MKR DOCD: CPT | Performed by: NURSE PRACTITIONER

## 2024-06-10 ENCOUNTER — CLINICAL SUPPORT (OUTPATIENT)
Dept: AUDIOLOGY | Facility: HOSPITAL | Age: 79
End: 2024-06-10

## 2024-06-10 DIAGNOSIS — H90.3 SENSORINEURAL HEARING LOSS, BILATERAL: Primary | ICD-10-CM

## 2024-06-10 PROCEDURE — V5299 HEARING SERVICE: HCPCS | Mod: AUDSP | Performed by: AUDIOLOGIST

## 2024-06-10 ASSESSMENT — PAIN - FUNCTIONAL ASSESSMENT: PAIN_FUNCTIONAL_ASSESSMENT: 0-10

## 2024-06-10 ASSESSMENT — PAIN SCALES - GENERAL: PAINLEVEL_OUTOF10: 0 - NO PAIN

## 2024-06-10 NOTE — PROGRESS NOTES
AUDIOLOGY HEARING AID CHECK      Name:  Domo Kelley  :  1945  Age:  78 y.o.  Date of Appointment:  6/10/2024     History:  Mr. Kelley is seen today for Hearing Aid Check  He reports no concerns with his hearing aids, and he is here for routine check and maintenance.    He does not hear his tinnitus when wearing the hearing aids, but he does hear it occasionally when not wearing the hearing aids.    Current hearing aids:  Hearing Aids:  Phonak Audeo L50R  Right # [id number]    Left # [id number]  Speakers: #1M right, #2M left   Domes: medium vented    Purchased: 2023  Repair/loss warranty ends: 2026  Last hearing evaluation: 2023    Upon initial examination, the hearing aids sounded good.      Cleaned and checked both devices.    Replaced filters bilaterally.  Replaced domes bilaterally.     Both hearing aids sound and look good on final examination.      Otoscopic observation revealed clear ear canals bilaterally.    Per data logging, this patient wears the hearing aids 6 hours / day on average.      IMPRESSIONS:  Both hearing aids function appropriately following today's routine maintenance procedures.  The patient also reports the aids sound good.      RECOMMENDATIONS:  -Daily use of hearing aids.  -Daily maintenance to be performed at home.   -Follow-up hearing aid check in 6-12 months or as needed.  Appointment is scheduled on 2025 at 8:30am.   -Enrichment of the sound environment (such as use of soft music, fan, or noise generators) and use of hearing aids to reduce tinnitus annoyance.     PATIENT EDUCATION:   Discussed above information with Mr. Kelley.  Questions were addressed and the patient was encouraged to contact our department should concerns arise.    Time:  13:55 to 14:17    LORRI Gonzalez, CCC-A  Senior Audiologist

## 2024-06-20 DIAGNOSIS — D45 POLYCYTHEMIA VERA (MULTI): Primary | ICD-10-CM

## 2024-06-20 RX ORDER — EPINEPHRINE 0.3 MG/.3ML
0.3 INJECTION SUBCUTANEOUS EVERY 5 MIN PRN
OUTPATIENT
Start: 2024-06-26

## 2024-06-20 RX ORDER — DIPHENHYDRAMINE HYDROCHLORIDE 50 MG/ML
50 INJECTION INTRAMUSCULAR; INTRAVENOUS AS NEEDED
OUTPATIENT
Start: 2024-06-26

## 2024-06-20 RX ORDER — FAMOTIDINE 10 MG/ML
20 INJECTION INTRAVENOUS ONCE AS NEEDED
OUTPATIENT
Start: 2024-06-26

## 2024-06-20 RX ORDER — ALBUTEROL SULFATE 0.83 MG/ML
3 SOLUTION RESPIRATORY (INHALATION) AS NEEDED
OUTPATIENT
Start: 2024-06-26

## 2024-06-20 NOTE — H&P
History Of Present Illness:    Domo Kelley is a 78 y.o. male presenting with trifascicular block who comes in today for pacemaker implant.     Last Recorded Vitals:  Vitals:    05/15/24 1949 05/15/24 2346 05/16/24 0339 05/16/24 0737   BP: 129/79 118/77 131/87 134/81   BP Location: Right arm Right arm Right arm Right arm   Patient Position: Lying Lying Lying Lying   Pulse: 61 68 74 66   Resp: 18 18 18 18   Temp: 37.2 °C (99 °F) 37.4 °C (99.3 °F) 36.4 °C (97.6 °F) 36.5 °C (97.7 °F)   TempSrc: Temporal Temporal Temporal Temporal   SpO2: 94% 95% 94% 94%   Weight:   93.3 kg (205 lb 11 oz)    Height:           Last Labs:  CBC - 5/16/2024:  4:06 AM  8.0 16.5 181    46.2      CMP - 5/16/2024:  4:06 AM  9.2 6.9 12 --- 0.7   _ 4.4 11 68      PTT - 5/16/2024:  4:06 AM  1.0   11.7 34     POC HEMOGLOBIN A1c   Date/Time Value Ref Range Status   10/30/2023 08:45 AM 6.8 (A) 4.2 - 6.5 % Final   04/27/2023 09:52 AM 6.3 4.2 - 6.5 % Final     VLDL   Date/Time Value Ref Range Status   07/25/2023 09:36 AM 23 0 - 40 mg/dL Final   06/28/2022 09:25 AM 21 0 - 40 mg/dL Final   06/10/2021 07:53 AM 28 0 - 40 mg/dL Final      Last I/O:  No intake/output data recorded.    Past Cardiology Tests (Last 3 Years):  EKG:  ECG 12 Lead 05/16/2024      ECG 12 lead PRN 05/15/2024      ECG 12 lead (Clinic Performed) 04/01/2024    Echo:  Transthoracic Echo (TTE) Complete 02/05/2024    Ejection Fractions:  EF   Date/Time Value Ref Range Status   02/05/2024 01:49 PM 70 %      Cath:  No results found for this or any previous visit from the past 1095 days.    Stress Test:  No results found for this or any previous visit from the past 1095 days.    Cardiac Imaging:  No results found for this or any previous visit from the past 1095 days.      Past Medical History:  He has a past medical history of Abnormal result of other cardiovascular function study (09/16/2019), Anxiety disorder, unspecified, Aortic root dilation (CMS-HCC), Bradycardia, Coronary  angioplasty status (10/04/2019), Encounter for screening for malignant neoplasm of colon (10/10/2017), Hyperlipidemia, Hypertension, JULIETA (obstructive sleep apnea), Personal history of other diseases of the circulatory system, Personal history of other diseases of the circulatory system (01/06/2020), Personal history of other diseases of the musculoskeletal system and connective tissue, Personal history of other diseases of the respiratory system, Personal history of other malignant neoplasm of skin, Personal history of other specified conditions (07/20/2019), and Shortness of breath (10/07/2019).    Past Surgical History:  He has a past surgical history that includes Hand surgery (10/10/2017); Other surgical history (10/10/2017); Tonsillectomy (10/10/2017); Other surgical history (07/17/2019); and Cardiac electrophysiology procedure (N/A, 5/15/2024).      Social History:  He reports that he has never smoked. He has never used smokeless tobacco. He reports current alcohol use. He reports that he does not use drugs.    Family History:  Family History   Problem Relation Name Age of Onset    Breast cancer Mother      Skin cancer Father          Allergies:  Lisinopril and Tetanus vaccines and toxoid    Inpatient Medications:        Outpatient Medications:  Current Outpatient Medications   Medication Instructions    amLODIPine (NORVASC) 5 mg, oral, Daily RT    atorvastatin (LIPITOR) 10 mg, oral, Daily    berberine/herbal complex no.18 (BERBERINE-HERBAL COMB NO.18 ORAL) 1,000 mg, oral, Daily    blood-glucose sensor (FreeStyle Carmela 3 Sensor) device Use 1 sensor q 14 days    chlorthalidone (HYGROTON) 25 mg, oral, Daily    cyanocobalamin (Vitamin B-12) 1,000 mcg tablet 1 tablet, oral, Daily    losartan (COZAAR) 25 mg, oral, Daily    metFORMIN XR (Glucophage-XR) 500 mg 24 hr tablet TAKE 2 TABLETS BY MOUTH TWICE A DAY    Trulicity 0.75 mg, subcutaneous, Once Weekly            Assessment/Plan   79y/o man with symptomatic  trifascicular block who comes in today for pacemaker implant. Will be admitted for overnight observation.        Code Status:  Full Code          Adrien Cordero MD

## 2024-06-26 ENCOUNTER — APPOINTMENT (OUTPATIENT)
Dept: LAB | Facility: HOSPITAL | Age: 79
End: 2024-06-26
Payer: MEDICARE

## 2024-06-26 ENCOUNTER — INFUSION (OUTPATIENT)
Dept: HEMATOLOGY/ONCOLOGY | Facility: CLINIC | Age: 79
End: 2024-06-26
Payer: MEDICARE

## 2024-06-26 VITALS
DIASTOLIC BLOOD PRESSURE: 69 MMHG | HEART RATE: 91 BPM | WEIGHT: 206.5 LBS | TEMPERATURE: 96.8 F | BODY MASS INDEX: 27.97 KG/M2 | OXYGEN SATURATION: 95 % | HEIGHT: 72 IN | SYSTOLIC BLOOD PRESSURE: 115 MMHG | RESPIRATION RATE: 16 BRPM

## 2024-06-26 DIAGNOSIS — D45 POLYCYTHEMIA VERA (MULTI): ICD-10-CM

## 2024-06-26 LAB
BASOPHILS # BLD AUTO: 0.01 X10*3/UL (ref 0–0.1)
BASOPHILS NFR BLD AUTO: 0.1 %
EOSINOPHIL # BLD AUTO: 0.09 X10*3/UL (ref 0–0.4)
EOSINOPHIL NFR BLD AUTO: 1.2 %
ERYTHROCYTE [DISTWIDTH] IN BLOOD BY AUTOMATED COUNT: 13 % (ref 11.5–14.5)
HCT VFR BLD AUTO: 45.3 % (ref 41–52)
HGB BLD-MCNC: 15.4 G/DL (ref 13.5–17.5)
IMM GRANULOCYTES # BLD AUTO: 0.01 X10*3/UL (ref 0–0.5)
IMM GRANULOCYTES NFR BLD AUTO: 0.1 % (ref 0–0.9)
LYMPHOCYTES # BLD AUTO: 2.18 X10*3/UL (ref 0.8–3)
LYMPHOCYTES NFR BLD AUTO: 28.1 %
MCH RBC QN AUTO: 27.6 PG (ref 26–34)
MCHC RBC AUTO-ENTMCNC: 34 G/DL (ref 32–36)
MCV RBC AUTO: 81 FL (ref 80–100)
MONOCYTES # BLD AUTO: 0.56 X10*3/UL (ref 0.05–0.8)
MONOCYTES NFR BLD AUTO: 7.2 %
NEUTROPHILS # BLD AUTO: 4.92 X10*3/UL (ref 1.6–5.5)
NEUTROPHILS NFR BLD AUTO: 63.3 %
PLATELET # BLD AUTO: 173 X10*3/UL (ref 150–450)
RBC # BLD AUTO: 5.58 X10*6/UL (ref 4.5–5.9)
WBC # BLD AUTO: 7.8 X10*3/UL (ref 4.4–11.3)

## 2024-06-26 PROCEDURE — 85025 COMPLETE CBC W/AUTO DIFF WBC: CPT | Performed by: INTERNAL MEDICINE

## 2024-06-26 PROCEDURE — 99195 PHLEBOTOMY: CPT

## 2024-06-26 PROCEDURE — 36415 COLL VENOUS BLD VENIPUNCTURE: CPT

## 2024-06-26 RX ORDER — EPINEPHRINE 0.3 MG/.3ML
0.3 INJECTION SUBCUTANEOUS EVERY 5 MIN PRN
Status: DISCONTINUED | OUTPATIENT
Start: 2024-06-26 | End: 2024-06-26 | Stop reason: HOSPADM

## 2024-06-26 RX ORDER — FAMOTIDINE 10 MG/ML
20 INJECTION INTRAVENOUS ONCE AS NEEDED
Status: DISCONTINUED | OUTPATIENT
Start: 2024-06-26 | End: 2024-06-26 | Stop reason: HOSPADM

## 2024-06-26 RX ORDER — DIPHENHYDRAMINE HYDROCHLORIDE 50 MG/ML
50 INJECTION INTRAMUSCULAR; INTRAVENOUS AS NEEDED
OUTPATIENT
Start: 2024-08-18

## 2024-06-26 RX ORDER — ALBUTEROL SULFATE 0.83 MG/ML
3 SOLUTION RESPIRATORY (INHALATION) AS NEEDED
Status: DISCONTINUED | OUTPATIENT
Start: 2024-06-26 | End: 2024-06-26 | Stop reason: HOSPADM

## 2024-06-26 RX ORDER — DIPHENHYDRAMINE HYDROCHLORIDE 50 MG/ML
50 INJECTION INTRAMUSCULAR; INTRAVENOUS AS NEEDED
Status: DISCONTINUED | OUTPATIENT
Start: 2024-06-26 | End: 2024-06-26 | Stop reason: HOSPADM

## 2024-06-26 RX ORDER — FAMOTIDINE 10 MG/ML
20 INJECTION INTRAVENOUS ONCE AS NEEDED
OUTPATIENT
Start: 2024-08-18

## 2024-06-26 RX ORDER — EPINEPHRINE 0.3 MG/.3ML
0.3 INJECTION SUBCUTANEOUS EVERY 5 MIN PRN
OUTPATIENT
Start: 2024-08-18

## 2024-06-26 RX ORDER — ALBUTEROL SULFATE 0.83 MG/ML
3 SOLUTION RESPIRATORY (INHALATION) AS NEEDED
OUTPATIENT
Start: 2024-08-18

## 2024-06-26 ASSESSMENT — PAIN SCALES - GENERAL: PAINLEVEL: 0-NO PAIN

## 2024-06-26 NOTE — PROGRESS NOTES
Patient here for phlebotomy. Hematocrit 45.3. Patient request phlebotomies be every 7 weeks. AVS and lab work given to patient. Patient tolerated well, 500cc removed. Patient refused to stay for 15 minute observation. Discharged in stable condition.

## 2024-07-15 DIAGNOSIS — E11.9 TYPE 2 DIABETES MELLITUS WITHOUT COMPLICATIONS (MULTI): ICD-10-CM

## 2024-07-16 RX ORDER — METFORMIN HYDROCHLORIDE 500 MG/1
TABLET, EXTENDED RELEASE ORAL
Qty: 360 TABLET | Refills: 3 | Status: SHIPPED | OUTPATIENT
Start: 2024-07-16

## 2024-08-07 ENCOUNTER — HOSPITAL ENCOUNTER (OUTPATIENT)
Dept: CARDIOLOGY | Facility: HOSPITAL | Age: 79
Discharge: HOME | End: 2024-08-07
Payer: MEDICARE

## 2024-08-07 DIAGNOSIS — Z95.0 PRESENCE OF CARDIAC PACEMAKER: ICD-10-CM

## 2024-08-07 DIAGNOSIS — R00.1 BRADYCARDIA: Primary | ICD-10-CM

## 2024-08-07 DIAGNOSIS — I44.0 FIRST DEGREE HEART BLOCK: ICD-10-CM

## 2024-08-07 DIAGNOSIS — R00.1 BRADYCARDIA: ICD-10-CM

## 2024-08-07 PROCEDURE — 93280 PM DEVICE PROGR EVAL DUAL: CPT

## 2024-08-14 ENCOUNTER — INFUSION (OUTPATIENT)
Dept: HEMATOLOGY/ONCOLOGY | Facility: CLINIC | Age: 79
End: 2024-08-14
Payer: MEDICARE

## 2024-08-14 VITALS
HEIGHT: 72 IN | BODY MASS INDEX: 28.44 KG/M2 | RESPIRATION RATE: 16 BRPM | HEART RATE: 74 BPM | SYSTOLIC BLOOD PRESSURE: 126 MMHG | WEIGHT: 210 LBS | OXYGEN SATURATION: 95 % | TEMPERATURE: 98.1 F | DIASTOLIC BLOOD PRESSURE: 71 MMHG

## 2024-08-14 DIAGNOSIS — D45 POLYCYTHEMIA VERA (MULTI): ICD-10-CM

## 2024-08-14 LAB
BASOPHILS # BLD AUTO: 0.01 X10*3/UL (ref 0–0.1)
BASOPHILS NFR BLD AUTO: 0.1 %
EOSINOPHIL # BLD AUTO: 0.1 X10*3/UL (ref 0–0.4)
EOSINOPHIL NFR BLD AUTO: 1.3 %
ERYTHROCYTE [DISTWIDTH] IN BLOOD BY AUTOMATED COUNT: 13 % (ref 11.5–14.5)
HCT VFR BLD AUTO: 44.3 % (ref 41–52)
HGB BLD-MCNC: 14.3 G/DL (ref 13.5–17.5)
IMM GRANULOCYTES # BLD AUTO: 0.01 X10*3/UL (ref 0–0.5)
IMM GRANULOCYTES NFR BLD AUTO: 0.1 % (ref 0–0.9)
LYMPHOCYTES # BLD AUTO: 1.57 X10*3/UL (ref 0.8–3)
LYMPHOCYTES NFR BLD AUTO: 21.1 %
MCH RBC QN AUTO: 26.4 PG (ref 26–34)
MCHC RBC AUTO-ENTMCNC: 32.3 G/DL (ref 32–36)
MCV RBC AUTO: 82 FL (ref 80–100)
MONOCYTES # BLD AUTO: 0.81 X10*3/UL (ref 0.05–0.8)
MONOCYTES NFR BLD AUTO: 10.9 %
NEUTROPHILS # BLD AUTO: 4.95 X10*3/UL (ref 1.6–5.5)
NEUTROPHILS NFR BLD AUTO: 66.5 %
PLATELET # BLD AUTO: 169 X10*3/UL (ref 150–450)
RBC # BLD AUTO: 5.41 X10*6/UL (ref 4.5–5.9)
WBC # BLD AUTO: 7.5 X10*3/UL (ref 4.4–11.3)

## 2024-08-14 PROCEDURE — 36415 COLL VENOUS BLD VENIPUNCTURE: CPT

## 2024-08-14 PROCEDURE — 99195 PHLEBOTOMY: CPT

## 2024-08-14 RX ORDER — EPINEPHRINE 0.3 MG/.3ML
0.3 INJECTION SUBCUTANEOUS EVERY 5 MIN PRN
OUTPATIENT
Start: 2024-08-18

## 2024-08-14 RX ORDER — FAMOTIDINE 10 MG/ML
20 INJECTION INTRAVENOUS ONCE AS NEEDED
OUTPATIENT
Start: 2024-08-18

## 2024-08-14 RX ORDER — ALBUTEROL SULFATE 0.83 MG/ML
3 SOLUTION RESPIRATORY (INHALATION) AS NEEDED
OUTPATIENT
Start: 2024-08-18

## 2024-08-14 RX ORDER — HEPARIN 100 UNIT/ML
500 SYRINGE INTRAVENOUS AS NEEDED
OUTPATIENT
Start: 2024-08-14

## 2024-08-14 RX ORDER — HEPARIN SODIUM,PORCINE/PF 10 UNIT/ML
50 SYRINGE (ML) INTRAVENOUS AS NEEDED
OUTPATIENT
Start: 2024-08-14

## 2024-08-14 RX ORDER — DIPHENHYDRAMINE HYDROCHLORIDE 50 MG/ML
50 INJECTION INTRAMUSCULAR; INTRAVENOUS AS NEEDED
OUTPATIENT
Start: 2024-08-18

## 2024-08-14 ASSESSMENT — PAIN SCALES - GENERAL: PAINLEVEL: 0-NO PAIN

## 2024-08-14 NOTE — PROGRESS NOTES
Patient identified by name & .   Patient Hematocrit 44.3- parameter 45 but patient still wishes to be phlebotomized. Dr. Sequeira states okay to receive phleb with Hematocrit of 44.3. Patient tolerated without complaints. Declined to stay for observation period- denies distress- none noted. VSS.  Discharged home, ambulatory, in stable condition.

## 2024-08-15 ENCOUNTER — TELEMEDICINE (OUTPATIENT)
Dept: PRIMARY CARE | Facility: CLINIC | Age: 79
End: 2024-08-15
Payer: MEDICARE

## 2024-08-15 ENCOUNTER — TELEPHONE (OUTPATIENT)
Dept: PRIMARY CARE | Facility: CLINIC | Age: 79
End: 2024-08-15
Payer: MEDICARE

## 2024-08-15 DIAGNOSIS — U07.1 COVID: Primary | ICD-10-CM

## 2024-08-15 DIAGNOSIS — I10 ESSENTIAL (PRIMARY) HYPERTENSION: ICD-10-CM

## 2024-08-15 PROCEDURE — 99441 PR PHYS/QHP TELEPHONE EVALUATION 5-10 MIN: CPT | Performed by: FAMILY MEDICINE

## 2024-08-15 PROCEDURE — 1123F ACP DISCUSS/DSCN MKR DOCD: CPT | Performed by: FAMILY MEDICINE

## 2024-08-15 PROCEDURE — 1160F RVW MEDS BY RX/DR IN RCRD: CPT | Performed by: FAMILY MEDICINE

## 2024-08-15 PROCEDURE — 1159F MED LIST DOCD IN RCRD: CPT | Performed by: FAMILY MEDICINE

## 2024-08-15 PROCEDURE — 1036F TOBACCO NON-USER: CPT | Performed by: FAMILY MEDICINE

## 2024-08-15 RX ORDER — CHLORTHALIDONE 25 MG/1
25 TABLET ORAL DAILY
Qty: 90 TABLET | Refills: 3 | Status: SHIPPED | OUTPATIENT
Start: 2024-08-15

## 2024-08-15 NOTE — PROGRESS NOTES
Subjective   Patient ID: Domo Kelley is a 78 y.o. male who presents for Cough (Cough, body aches, no energy x 2 days, positive covid today).  HPI  Started with fatigue and cough.  He has headache and dry mouth, TEMP 100.7.  Chills and achey.  He tested and was positive for COVID.  Has been vaccinated.  Denies SOB or Chest pain,.    Review of Systems    Objective   Physical Exam    Assessment/Plan   Problem List Items Addressed This Visit    None  Visit Diagnoses       COVID    -  Primary        Time on the phone was less than 10 minutes  COVID Treatment:  Given Paxlovid and stopped atorvastatin for 10 days  Vitamin D 3 4000 IUs daily  N-acetylcysteine 500 to 600 mg 2 times a day  Vitamin C 1000 mg 3-4 times a day  Water mixed with hydrogen peroxide and gargle 3 times a day  Salt water nasal rinses 2-3 times a day    pulse oximeter:  If oxygen saturations are dropping below 90% please contact our office    If symptoms worsening: Shortness of breath, fatigue, swelling in the legs or arms contact our office or go to the ED

## 2024-08-15 NOTE — TELEPHONE ENCOUNTER
Pt wife lm stating keerthi tested positive for covid they are requesting a script for paxlovid would you like them on for virtual?

## 2024-09-04 ENCOUNTER — TELEPHONE (OUTPATIENT)
Dept: CARDIOLOGY | Facility: HOSPITAL | Age: 79
End: 2024-09-04
Payer: MEDICARE

## 2024-09-04 PROBLEM — I50.32 HEART FAILURE WITH IMPROVED EJECTION FRACTION (HFIMPEF) (MULTI): Status: ACTIVE | Noted: 2024-09-04

## 2024-09-04 PROBLEM — Q24.5 CORONARY ARTERY ANOMALY (HHS-HCC): Status: ACTIVE | Noted: 2024-09-04

## 2024-09-04 ASSESSMENT — ENCOUNTER SYMPTOMS
NAUSEA: 0
SHORTNESS OF BREATH: 0
HEMATURIA: 0
IRREGULAR HEARTBEAT: 0
VOMITING: 0
CHILLS: 0
HEMATOCHEZIA: 0
WHEEZING: 0
SYNCOPE: 0
NEAR-SYNCOPE: 0
FEVER: 0
ORTHOPNEA: 0
ALTERED MENTAL STATUS: 0
COUGH: 0

## 2024-09-04 NOTE — PATIENT INSTRUCTIONS
Recommend Mediterranean style of eating  Continue current medications  Check repeat echocardiogram Feb 2025  Follow-up with Dr. Rios in 6 months  If you have any questions or cardiac concerns, please call our office at [phone].

## 2024-09-04 NOTE — PROGRESS NOTES
"Subjective   Reason for Visit: Domo Kelley is an 78 y.o. male here for follow-up on chronic medical conditions and annual physical exam  Hemoglobin A1c     HPI  Reviewed Chronic illnesses  Concerns today: had COIVID and followingup on it.  When can he get the vaccinaiont    In general the patient states that his health is:good    Regular dental visits: Regular.  No issues  Vision problems: no changes in the vision  Hearing loss: Hearing aides    Diet: Well balanced  Exercise: acitve  Weight concerns: no  Sleep: JULIETA and uses CPAP.  Gets good sleep.  Uses everynight.    Caffeine: regualr  Water: good intake    Urinary difficulties has urinary frequency and nocturia    Colon cancer screening:  Colonoscopy    Patient Care Team:  Chris Kelsey DO as PCP - General  Chris Kelsey DO as PCP - MSSP ACO Attributed Provider  Carlos Rios MD as Consulting Physician (Cardiology)  Dominga Sorenson MD (Dermatology)  Rasta Sequeira MD as Consulting Physician (Hematology and Oncology)     Review of Systems    Objective   Vitals:  /78 (BP Location: Left arm, Patient Position: Sitting, BP Cuff Size: Adult)   Pulse 94   Temp 36.4 °C (97.6 °F)   Ht 1.836 m (6' 0.28\")   Wt 92.5 kg (204 lb)   SpO2 97%   BMI 27.45 kg/m²       Physical Exam  General: Patient is alert and oriented ×3 and appears in no acute distress. No respiratory distress.    Head: Atraumatic normocephalic.    Eyes: EOMI, PERRLA      Ears: Canals patent without any irritation, tympanic membranes without inflammation, no swelling, normal light reflex.    Nose: Nares patent. Turbinates are swollen. No discharge.    Mouth: Normal mucosa. Moist. No erythema, exudates, tonsillar enlargement.    Neck: Normal range of motion, no masses.  Thyroid is palpable and normal in size without any nodules. No anterior cervical or posterior cervical adenopathy.    Heart: Regular rate and rhythm, no murmurs clicks or gallops    Lungs: Clear to auscultation bilaterally " without any rhonchi rales or wheezing, lung sounds heard throughout all lung fields    Abdomen: Soft, nontender, no rigidity, rebound, guarding or organomegaly. Bowel sounds ×4 quadrants.    Musculoskeletal: Normal range of motion, strength is grossly intact in the proximal distal muscles of the upper and lower extremities bilaterally, deep tendon reflexes +2 out of 4 and symmetric bilaterally at the patella, Achilles, biceps, triceps, sensation intact.    Nerves: Cranial nerves II through XII appear grossly intact and without deficit    Skin: Intact, dry, no rashes or erythema    Psych: Normal affect.  Assessment/Plan   Problem List Items Addressed This Visit       Aortic root dilatation (CMS-HCC)    Benign essential hypertension    Relevant Orders    CBC and Auto Differential    Comprehensive Metabolic Panel    Hemoglobin A1C    Lipid Panel    Vitamin B12    Chronic systolic heart failure (Multi)    Controlled type 2 diabetes mellitus without complication, without long-term current use of insulin (Multi)    Relevant Orders    POCT glycosylated hemoglobin (Hb A1C) manually resulted (Completed)    CBC and Auto Differential    Comprehensive Metabolic Panel    Hemoglobin A1C    Lipid Panel    Vitamin B12    First degree heart block    Nonischemic cardiomyopathy (Multi)    Obstructive sleep apnea syndrome    Relevant Orders    Home sleep apnea test (HSAT)    Polycythemia vera (Multi)    Overview     POLYCYTHEMIA VERA - (D45)         Right bundle branch block (RBBB) determined by electrocardiography     Other Visit Diagnoses       Annual physical exam    -  Primary    Vitamin D deficiency        Relevant Orders    Vitamin D 25-Hydroxy,Total (for eval of Vitamin D levels)    Encounter for immunization        Relevant Orders    Flu vaccine, trivalent, preservative free, HIGH-DOSE, age 65y+ (Fluzone)          1. Polycythemia   Initially diagnosed 2015 and received treatment through [facility]   with therapeutic  phlebotomies. Transferred care closer to home at [facility].   Current treatment- therapeutic phlebotomies every 2 months to maintain goal   hematocrit <45%. Hematocrit today- 47.8%      diabetes type 2  - Controlled with HBA1c 6.8 9/5/24  -metformin ER 1000 mg twice daily.   - Trulicity .75 mg weekly  -Patient followed up with [facility] December 2023  -Mediteranian diet suggested  - Microalbumin done 10/30/2023 normal  - exercise at least 15 to 20 minutes a day 4 days a week  - Atorvastatin 10 mg daily   - Continue Berberine 500 mg 3 x day  - Freestyle Carmela  -Repeat labs in 6 months    Essential hypertension  -Controlled at 126/78    Obstructive sleep apnea  -Controlled on CPAP patient uses the CPAP for greater than 4 hours a night and uses it every night. Notices relief when using the CPAP.  - Repeationg sleep study  - after the study we will send to Anshul Garcia MD for evaluation of Inspire device    Vitamin D deficiency         Polycythemia vera  -Receiving phlebotomy every 2 months. Dr. Moctezuma hematologist. DR CHRISTOPHER following    Noniscemic Cardiomyopathy/aortic root dilation/chronic systolic heart failure  Pace maker  Aortic root dilation  02/05/2024 - TTE  1. Left ventricular systolic function is normal.  2. Mild dilatation of the ascending aorta (3.68 cm)  3. Mild dilatation of the aortic root   - Stable and followed by Dr Rios    Squamous cell carcinoma- Followed by Dr Sorenson Dermatology    Allergic rhinitis  - Instructed to use Flonase 1 spray per nostril after using salt water nasal rinses    Follow up in 6 months

## 2024-09-05 ENCOUNTER — APPOINTMENT (OUTPATIENT)
Dept: PRIMARY CARE | Facility: CLINIC | Age: 79
End: 2024-09-05
Payer: MEDICARE

## 2024-09-05 VITALS
DIASTOLIC BLOOD PRESSURE: 78 MMHG | BODY MASS INDEX: 27.63 KG/M2 | WEIGHT: 204 LBS | OXYGEN SATURATION: 97 % | SYSTOLIC BLOOD PRESSURE: 126 MMHG | HEIGHT: 72 IN | TEMPERATURE: 97.6 F | HEART RATE: 94 BPM

## 2024-09-05 DIAGNOSIS — G47.33 OBSTRUCTIVE SLEEP APNEA SYNDROME: ICD-10-CM

## 2024-09-05 DIAGNOSIS — E11.9 CONTROLLED TYPE 2 DIABETES MELLITUS WITHOUT COMPLICATION, WITHOUT LONG-TERM CURRENT USE OF INSULIN (MULTI): ICD-10-CM

## 2024-09-05 DIAGNOSIS — I10 BENIGN ESSENTIAL HYPERTENSION: ICD-10-CM

## 2024-09-05 DIAGNOSIS — Z00.00 ANNUAL PHYSICAL EXAM: Primary | ICD-10-CM

## 2024-09-05 DIAGNOSIS — I45.10 RIGHT BUNDLE BRANCH BLOCK (RBBB) DETERMINED BY ELECTROCARDIOGRAPHY: ICD-10-CM

## 2024-09-05 DIAGNOSIS — E55.9 VITAMIN D DEFICIENCY: ICD-10-CM

## 2024-09-05 DIAGNOSIS — I42.8 NONISCHEMIC CARDIOMYOPATHY (MULTI): ICD-10-CM

## 2024-09-05 DIAGNOSIS — D45 POLYCYTHEMIA VERA (MULTI): ICD-10-CM

## 2024-09-05 DIAGNOSIS — I77.810 AORTIC ROOT DILATATION (CMS-HCC): ICD-10-CM

## 2024-09-05 DIAGNOSIS — I50.22 CHRONIC SYSTOLIC HEART FAILURE (MULTI): ICD-10-CM

## 2024-09-05 DIAGNOSIS — I44.0 FIRST DEGREE HEART BLOCK: ICD-10-CM

## 2024-09-05 DIAGNOSIS — Z23 ENCOUNTER FOR IMMUNIZATION: ICD-10-CM

## 2024-09-05 LAB — POC HEMOGLOBIN A1C: 6.8 % (ref 4.2–6.5)

## 2024-09-05 PROCEDURE — 1158F ADVNC CARE PLAN TLK DOCD: CPT | Performed by: FAMILY MEDICINE

## 2024-09-05 PROCEDURE — 3074F SYST BP LT 130 MM HG: CPT | Performed by: FAMILY MEDICINE

## 2024-09-05 PROCEDURE — 1159F MED LIST DOCD IN RCRD: CPT | Performed by: FAMILY MEDICINE

## 2024-09-05 PROCEDURE — 99214 OFFICE O/P EST MOD 30 MIN: CPT | Performed by: FAMILY MEDICINE

## 2024-09-05 PROCEDURE — 1036F TOBACCO NON-USER: CPT | Performed by: FAMILY MEDICINE

## 2024-09-05 PROCEDURE — 3078F DIAST BP <80 MM HG: CPT | Performed by: FAMILY MEDICINE

## 2024-09-05 PROCEDURE — 83036 HEMOGLOBIN GLYCOSYLATED A1C: CPT | Performed by: FAMILY MEDICINE

## 2024-09-05 PROCEDURE — 1160F RVW MEDS BY RX/DR IN RCRD: CPT | Performed by: FAMILY MEDICINE

## 2024-09-05 PROCEDURE — 90662 IIV NO PRSV INCREASED AG IM: CPT | Performed by: FAMILY MEDICINE

## 2024-09-05 PROCEDURE — 1123F ACP DISCUSS/DSCN MKR DOCD: CPT | Performed by: FAMILY MEDICINE

## 2024-09-05 PROCEDURE — G0008 ADMIN INFLUENZA VIRUS VAC: HCPCS | Performed by: FAMILY MEDICINE

## 2024-09-05 ASSESSMENT — PATIENT HEALTH QUESTIONNAIRE - PHQ9
SUM OF ALL RESPONSES TO PHQ9 QUESTIONS 1 AND 2: 0
2. FEELING DOWN, DEPRESSED OR HOPELESS: NOT AT ALL
1. LITTLE INTEREST OR PLEASURE IN DOING THINGS: NOT AT ALL

## 2024-09-06 ENCOUNTER — APPOINTMENT (OUTPATIENT)
Dept: CARDIOLOGY | Facility: CLINIC | Age: 79
End: 2024-09-06
Payer: MEDICARE

## 2024-09-06 ENCOUNTER — LAB (OUTPATIENT)
Dept: LAB | Facility: LAB | Age: 79
End: 2024-09-06
Payer: MEDICARE

## 2024-09-06 VITALS
SYSTOLIC BLOOD PRESSURE: 144 MMHG | HEART RATE: 62 BPM | OXYGEN SATURATION: 97 % | WEIGHT: 206.4 LBS | BODY MASS INDEX: 27.96 KG/M2 | DIASTOLIC BLOOD PRESSURE: 90 MMHG | HEIGHT: 72 IN

## 2024-09-06 DIAGNOSIS — R42 POSTURAL DIZZINESS WITH PRESYNCOPE: ICD-10-CM

## 2024-09-06 DIAGNOSIS — I77.810 AORTIC ROOT DILATATION (CMS-HCC): ICD-10-CM

## 2024-09-06 DIAGNOSIS — E55.9 VITAMIN D DEFICIENCY: ICD-10-CM

## 2024-09-06 DIAGNOSIS — I50.32 HEART FAILURE WITH IMPROVED EJECTION FRACTION (HFIMPEF) (MULTI): Primary | ICD-10-CM

## 2024-09-06 DIAGNOSIS — R55 POSTURAL DIZZINESS WITH PRESYNCOPE: ICD-10-CM

## 2024-09-06 DIAGNOSIS — Q24.5 CORONARY ARTERY ANOMALY (HHS-HCC): ICD-10-CM

## 2024-09-06 LAB — 25(OH)D3 SERPL-MCNC: 30 NG/ML (ref 30–100)

## 2024-09-06 PROCEDURE — 82306 VITAMIN D 25 HYDROXY: CPT

## 2024-09-06 PROCEDURE — 1036F TOBACCO NON-USER: CPT | Performed by: NURSE PRACTITIONER

## 2024-09-06 PROCEDURE — 3077F SYST BP >= 140 MM HG: CPT | Performed by: NURSE PRACTITIONER

## 2024-09-06 PROCEDURE — 36415 COLL VENOUS BLD VENIPUNCTURE: CPT

## 2024-09-06 PROCEDURE — 99214 OFFICE O/P EST MOD 30 MIN: CPT | Performed by: NURSE PRACTITIONER

## 2024-09-06 PROCEDURE — 1123F ACP DISCUSS/DSCN MKR DOCD: CPT | Performed by: NURSE PRACTITIONER

## 2024-09-06 PROCEDURE — 1159F MED LIST DOCD IN RCRD: CPT | Performed by: NURSE PRACTITIONER

## 2024-09-06 PROCEDURE — 3080F DIAST BP >= 90 MM HG: CPT | Performed by: NURSE PRACTITIONER

## 2024-09-06 PROCEDURE — 1160F RVW MEDS BY RX/DR IN RCRD: CPT | Performed by: NURSE PRACTITIONER

## 2024-09-06 SDOH — ECONOMIC STABILITY: FOOD INSECURITY: WITHIN THE PAST 12 MONTHS, YOU WORRIED THAT YOUR FOOD WOULD RUN OUT BEFORE YOU GOT MONEY TO BUY MORE.: NEVER TRUE

## 2024-09-06 SDOH — ECONOMIC STABILITY: FOOD INSECURITY: WITHIN THE PAST 12 MONTHS, THE FOOD YOU BOUGHT JUST DIDN'T LAST AND YOU DIDN'T HAVE MONEY TO GET MORE.: NEVER TRUE

## 2024-09-06 ASSESSMENT — ENCOUNTER SYMPTOMS
PALPITATIONS: 0
LIGHT-HEADEDNESS: 1
DYSPNEA ON EXERTION: 0

## 2024-09-08 NOTE — RESULT ENCOUNTER NOTE
Please let the patient know that his vitamin D was low and I would like him to take vitamin D3 2000 IUs daily

## 2024-09-09 ENCOUNTER — TELEPHONE (OUTPATIENT)
Dept: PRIMARY CARE | Facility: CLINIC | Age: 79
End: 2024-09-09
Payer: MEDICARE

## 2024-09-09 NOTE — TELEPHONE ENCOUNTER
----- Message from Chris Kelsey sent at 9/8/2024  4:56 PM EDT -----  Please let the patient know that his vitamin D was low and I would like him to take vitamin D3 2000 IUs daily

## 2024-09-09 NOTE — TELEPHONE ENCOUNTER
I called Domo and let him know. He said he saw the results and he started taking the Vitamin D3 already.

## 2024-09-10 ENCOUNTER — INFUSION (OUTPATIENT)
Dept: HEMATOLOGY/ONCOLOGY | Facility: CLINIC | Age: 79
End: 2024-09-10
Payer: MEDICARE

## 2024-09-10 ENCOUNTER — OFFICE VISIT (OUTPATIENT)
Dept: HEMATOLOGY/ONCOLOGY | Facility: CLINIC | Age: 79
End: 2024-09-10
Payer: MEDICARE

## 2024-09-10 VITALS
WEIGHT: 209.55 LBS | OXYGEN SATURATION: 97 % | TEMPERATURE: 97.9 F | BODY MASS INDEX: 28.38 KG/M2 | RESPIRATION RATE: 16 BRPM | HEART RATE: 98 BPM | DIASTOLIC BLOOD PRESSURE: 80 MMHG | HEIGHT: 72 IN | SYSTOLIC BLOOD PRESSURE: 156 MMHG

## 2024-09-10 DIAGNOSIS — D45 POLYCYTHEMIA VERA (MULTI): Primary | ICD-10-CM

## 2024-09-10 DIAGNOSIS — D50.9 IRON DEFICIENCY ANEMIA, UNSPECIFIED IRON DEFICIENCY ANEMIA TYPE: ICD-10-CM

## 2024-09-10 DIAGNOSIS — D75.1 SECONDARY POLYCYTHEMIA: ICD-10-CM

## 2024-09-10 DIAGNOSIS — R53.82 CHRONIC FATIGUE: ICD-10-CM

## 2024-09-10 DIAGNOSIS — D45 POLYCYTHEMIA VERA (MULTI): ICD-10-CM

## 2024-09-10 LAB
ALBUMIN SERPL BCP-MCNC: 4.4 G/DL (ref 3.4–5)
ALP SERPL-CCNC: 66 U/L (ref 33–136)
ALT SERPL W P-5'-P-CCNC: 19 U/L (ref 10–52)
ANION GAP SERPL CALC-SCNC: 10 MMOL/L (ref 10–20)
AST SERPL W P-5'-P-CCNC: 17 U/L (ref 9–39)
BASOPHILS # BLD AUTO: 0.02 X10*3/UL (ref 0–0.1)
BASOPHILS NFR BLD AUTO: 0.3 %
BILIRUB SERPL-MCNC: 0.7 MG/DL (ref 0–1.2)
BUN SERPL-MCNC: 21 MG/DL (ref 6–23)
CALCIUM SERPL-MCNC: 9.2 MG/DL (ref 8.6–10.3)
CHLORIDE SERPL-SCNC: 100 MMOL/L (ref 98–107)
CO2 SERPL-SCNC: 30 MMOL/L (ref 21–32)
CREAT SERPL-MCNC: 0.9 MG/DL (ref 0.5–1.3)
EGFRCR SERPLBLD CKD-EPI 2021: 88 ML/MIN/1.73M*2
EOSINOPHIL # BLD AUTO: 0.11 X10*3/UL (ref 0–0.4)
EOSINOPHIL NFR BLD AUTO: 1.8 %
ERYTHROCYTE [DISTWIDTH] IN BLOOD BY AUTOMATED COUNT: 13.1 % (ref 11.5–14.5)
GLUCOSE SERPL-MCNC: 168 MG/DL (ref 74–99)
HCT VFR BLD AUTO: 42.7 % (ref 41–52)
HGB BLD-MCNC: 13.8 G/DL (ref 13.5–17.5)
IMM GRANULOCYTES # BLD AUTO: 0.01 X10*3/UL (ref 0–0.5)
IMM GRANULOCYTES NFR BLD AUTO: 0.2 % (ref 0–0.9)
IRON SATN MFR SERPL: 13 % (ref 25–45)
IRON SERPL-MCNC: 55 UG/DL (ref 35–150)
LYMPHOCYTES # BLD AUTO: 2.18 X10*3/UL (ref 0.8–3)
LYMPHOCYTES NFR BLD AUTO: 35.7 %
MCH RBC QN AUTO: 25.9 PG (ref 26–34)
MCHC RBC AUTO-ENTMCNC: 32.3 G/DL (ref 32–36)
MCV RBC AUTO: 80 FL (ref 80–100)
MONOCYTES # BLD AUTO: 0.62 X10*3/UL (ref 0.05–0.8)
MONOCYTES NFR BLD AUTO: 10.2 %
NEUTROPHILS # BLD AUTO: 3.16 X10*3/UL (ref 1.6–5.5)
NEUTROPHILS NFR BLD AUTO: 51.8 %
PLATELET # BLD AUTO: 174 X10*3/UL (ref 150–450)
POTASSIUM SERPL-SCNC: 3.9 MMOL/L (ref 3.5–5.3)
PROT SERPL-MCNC: 6.9 G/DL (ref 6.4–8.2)
RBC # BLD AUTO: 5.32 X10*6/UL (ref 4.5–5.9)
SODIUM SERPL-SCNC: 136 MMOL/L (ref 136–145)
TIBC SERPL-MCNC: 432 UG/DL (ref 240–445)
UIBC SERPL-MCNC: 377 UG/DL (ref 110–370)
WBC # BLD AUTO: 6.1 X10*3/UL (ref 4.4–11.3)

## 2024-09-10 PROCEDURE — 36415 COLL VENOUS BLD VENIPUNCTURE: CPT | Performed by: INTERNAL MEDICINE

## 2024-09-10 PROCEDURE — 1159F MED LIST DOCD IN RCRD: CPT | Performed by: INTERNAL MEDICINE

## 2024-09-10 PROCEDURE — 1126F AMNT PAIN NOTED NONE PRSNT: CPT | Performed by: INTERNAL MEDICINE

## 2024-09-10 PROCEDURE — 1160F RVW MEDS BY RX/DR IN RCRD: CPT | Performed by: INTERNAL MEDICINE

## 2024-09-10 PROCEDURE — 3077F SYST BP >= 140 MM HG: CPT | Performed by: INTERNAL MEDICINE

## 2024-09-10 PROCEDURE — 1123F ACP DISCUSS/DSCN MKR DOCD: CPT | Performed by: INTERNAL MEDICINE

## 2024-09-10 PROCEDURE — 99213 OFFICE O/P EST LOW 20 MIN: CPT | Performed by: INTERNAL MEDICINE

## 2024-09-10 PROCEDURE — 3079F DIAST BP 80-89 MM HG: CPT | Performed by: INTERNAL MEDICINE

## 2024-09-10 RX ORDER — ALBUTEROL SULFATE 0.83 MG/ML
3 SOLUTION RESPIRATORY (INHALATION) AS NEEDED
OUTPATIENT
Start: 2024-10-15

## 2024-09-10 RX ORDER — FAMOTIDINE 10 MG/ML
20 INJECTION INTRAVENOUS ONCE AS NEEDED
OUTPATIENT
Start: 2024-10-15

## 2024-09-10 RX ORDER — EPINEPHRINE 0.3 MG/.3ML
0.3 INJECTION SUBCUTANEOUS EVERY 5 MIN PRN
OUTPATIENT
Start: 2024-10-15

## 2024-09-10 RX ORDER — DIPHENHYDRAMINE HYDROCHLORIDE 50 MG/ML
50 INJECTION INTRAMUSCULAR; INTRAVENOUS AS NEEDED
OUTPATIENT
Start: 2024-10-15

## 2024-09-10 ASSESSMENT — PAIN SCALES - GENERAL: PAINLEVEL: 0-NO PAIN

## 2024-09-10 NOTE — PROGRESS NOTES
Patient Visit Information:   Visit Type: Follow Up Visit      History of Present Illness:      ID Statement:    DOMO ROBERTS is a 77 year old Male        Chief Complaint: Polycythemia vera   Interval History:      Domo Roberts presents today for interval follow-up and treatment of polycythemia.     He reports he overall feels well. He has been receiving therapeutic phlebotomies every 2 months and tolerating well with good response.    Sometime patient has a headache, very  active, still working full-time, no night sweats, no fever, no chest pain, no shortness of breath, no abdominal pain, no diarrhea, no arthritis, no itching after hot showers.  Today hematocrit 42.7.     Past Medical History:  Polycythemia: Initially diagnosed May 2015 and started treatment with therapeutic phlebotomies at [facility] and transferred care closer  at [facility] in July 2021. Has  been receiving therapeutic phlebotomies to maintain goal hematocrit <45%.      Hypertension, Vitamin D deficiency, Diabetes Mellitus, Non-ischemic cardiomyopathy, JULIETA, AV block, CAD, History of squamous cell carcinoma of skin, Osteoarthritis.      Social History:  He grows wine grapes and turf seed.        Review of Systems:   Review of Systems:    Constitutional: Feeling well, no fatigue or weakness. No fever, chills, night sweats.  Head and neck: No dizziness. Intermittent headaches.    HEENT: No sore throat or sinusitis.  Hearing is normal eyesight is good.  Cardiac: No chest pain or palpitations.  Pulmonary: no cough or shortness of breath.  GI: Appetite is good and weight stable.  No constipation or diarrhea.  No abdominal pain  Genitourinary: No frequency or urgency.  No polyuria or dysuria.  Musculocutaneous: History of osteoarthritis.  Endocrine: No thyroid disease. History of diabetes.   Skin: No rash or itching.  Neuromuscular : no fainting or dizziness.  No history of convulsions.  No tingling or numbness.           Allergies  and Intolerances:       Allergies:         No Known Allergies: Drug, Unknown, Active     Outpatient Medication Profile:  * Patient Currently Takes Medications as of 25-Jul-2023 09:56 documented in Structured Notes         metFORMIN HCl - 1000 MG Oral Tablet : Last Dose Taken:  , TAKE 1 TABLET TWICE DAILY., Start Date: 02-Dec-2020         Chlorthalidone 25 MG Oral Tablet: Last Dose Taken:  , TAKE 1 TABLET DAILY.,  Start Date: 01-Aug-2019         losartan 25 mg oral tablet: Last Dose Taken:  , 1 tab(s) orally once  a day         Vitamin B Complex 100 injectable solution: Last Dose Taken:  , 1 cap(s)  orally once a day             Medical History:         Polycythemia vera: ICD-10: D45, Status: Active         Polycythemia vera: ICD-10: D45, Status: Active     Family History: No Family History items are recorded  in the problem list.      Social History:   Social Substance History:  ·  Smoking Status never smoker (1)   ·  Alcohol Use denies   ·  Drug Use denies            Vitals and Measurements:   Vitals: Temp: 35.6  HR: 50  RR: 16  BP: 156/75  SPO2%:   97   Measurements: HT(cm): 183  WT(kg): 93.7  BSA: 2.18   BMI:  27.9   Last 3 Weights & Heights: Date:                           Weight/Scale Type:                    Height:   25-Jul-2023 09:51                93.7  kg                     183  cm  30-May-2023 09:05                94.7  kg                     183  cm  28-Mar-2023 10:50                96.1  kg                     183  cm      Physical Exam:      Constitutional: awake/alert/oriented x3, no distress,   Eyes: PERRL, EOMI, clear sclera   ENMT: mucous membranes moist,   Head/Neck: Neck supple,  thyroid without mass or  tenderness, No JVD, trachea midline, no bruits   Respiratory/Thorax: Patent airways, CTAB, normal  breath sounds   Cardiovascular: Regular, rate and rhythm,   normal  S 1and S 2   Gastrointestinal: Nondistended, soft, non-tender,   no masses palpable, no organomegaly, +BS,   Musculoskeletal: ROM  intact, no joint swelling, normal  strength   Extremities: normal extremities, no cyanosis edema,   no clubbing   Neurological: alert and oriented x3,   Lymphatic: No significant lymphadenopathy   Psychological: Appropriate mood and behavior   Skin: Warm and dry, no lesions,         Lab Results:           Ref Range & Units 11:25 3 wk ago 2 mo ago 3 mo ago 4 mo ago 6 mo ago 8 mo ago   WBC  4.4 - 11.3 x10*3/uL 6.1 7.5 7.8 8.0 6.4 8.7 6.9   RBC  4.50 - 5.90 x10*6/uL 5.32 5.41 5.58 5.85 5.85 6.23 High  6.06 High    Hemoglobin  13.5 - 17.5 g/dL 13.8 14.3 15.4 16.5 16.7 17.2 16.9   Hematocrit  41.0 - 52.0 % 42.7 44.3 45.3 46.2 48.1 50.0 49.3   MCV  80 - 100 fL 80 82 81 79 Low  82 80 81   MCH  26.0 - 34.0 pg 25.9 Low  26.4 27.6 28.2 28.5 27.6 27.9   MCHC  32.0 - 36.0 g/dL 32.3 32.3 34.0 35.7 34.7 34.4 34.3   RDW  11.5 - 14.5 % 13.1 13.0 13.0 12.8 13.0 13.1 12.9   Platelets  150 - 450 x10*3/uL 174 169 173           ·  Results             Assessment and Plan:      Assessment and Plan:   Assessment:    1. Polycythemia  Initially diagnosed 2015 and received treatment through [facility] with therapeutic phlebotomies. Transferred care closer to [place] at [facility] [place]. Current treatment- therapeutic  phlebotomies every 2 months to maintain goal hematocrit <45%. Hematocrit today- 47.8%     2. Multiple medical conditions including HTN, JULIETA, DM, non-ischemic cardiomyopathy, etc.     Plan:  No phlebotomy today, HCT 42.7 continue treatment  of polycythemia with every 2 month phlebotomies for goal hematocrit <45%. Return in 2 months for follow-up visit   .-Monitor CBC, CMP, iron studies.        Total time =20 minutes. 50% or more of this time was spent in counseling and/or coordination of care including reviewing medical history/radiology/labs, examining patient, formulating outlined plan  with team, and discussing plan with patient/family.  I reviewed patient's chart including but not limited to labs, imaging,  surgical/procedure notes, pathology, hospital notes, doctor's notes.

## 2024-09-10 NOTE — PATIENT INSTRUCTIONS
Follow up today for history of polycythemia.     Therapeutic phlebotomy for hematocrit greater than 45 every 8 weeks.     Hematocrit today 42.7.     Follow up with Dr. Sequeira in 4 months.

## 2024-09-23 ENCOUNTER — PROCEDURE VISIT (OUTPATIENT)
Dept: SLEEP MEDICINE | Facility: HOSPITAL | Age: 79
End: 2024-09-23
Payer: MEDICARE

## 2024-09-23 DIAGNOSIS — G47.33 OBSTRUCTIVE SLEEP APNEA SYNDROME: ICD-10-CM

## 2024-09-23 PROCEDURE — 95806 SLEEP STUDY UNATT&RESP EFFT: CPT | Performed by: HOSPITALIST

## 2024-09-28 DIAGNOSIS — I10 ESSENTIAL (PRIMARY) HYPERTENSION: ICD-10-CM

## 2024-09-30 RX ORDER — LOSARTAN POTASSIUM 25 MG/1
25 TABLET ORAL DAILY
Qty: 90 TABLET | Refills: 3 | Status: SHIPPED | OUTPATIENT
Start: 2024-09-30

## 2024-10-01 ENCOUNTER — TELEPHONE (OUTPATIENT)
Dept: PRIMARY CARE | Facility: CLINIC | Age: 79
End: 2024-10-01
Payer: MEDICARE

## 2024-10-01 DIAGNOSIS — G47.33 OBSTRUCTIVE SLEEP APNEA SYNDROME: Primary | ICD-10-CM

## 2024-10-01 NOTE — TELEPHONE ENCOUNTER
----- Message from Chris Kelsey sent at 10/1/2024 12:07 PM EDT -----  Will need an inlab sleep stiudy or repeat home sleep study.    This one had several times where there was a loss of signal

## 2024-10-01 NOTE — RESULT ENCOUNTER NOTE
Will need an inlab sleep stiudy or repeat home sleep study.    This one had several times where there was a loss of signal

## 2024-10-02 ENCOUNTER — APPOINTMENT (OUTPATIENT)
Dept: HEMATOLOGY/ONCOLOGY | Facility: CLINIC | Age: 79
End: 2024-10-02
Payer: MEDICARE

## 2024-10-09 ENCOUNTER — INFUSION (OUTPATIENT)
Dept: HEMATOLOGY/ONCOLOGY | Facility: CLINIC | Age: 79
End: 2024-10-09
Payer: MEDICARE

## 2024-10-09 ENCOUNTER — APPOINTMENT (OUTPATIENT)
Dept: LAB | Facility: HOSPITAL | Age: 79
End: 2024-10-09
Payer: MEDICARE

## 2024-10-09 VITALS
TEMPERATURE: 98.2 F | WEIGHT: 208.5 LBS | RESPIRATION RATE: 16 BRPM | OXYGEN SATURATION: 96 % | BODY MASS INDEX: 28.06 KG/M2 | DIASTOLIC BLOOD PRESSURE: 80 MMHG | SYSTOLIC BLOOD PRESSURE: 130 MMHG | HEART RATE: 85 BPM

## 2024-10-09 DIAGNOSIS — D45 POLYCYTHEMIA VERA: ICD-10-CM

## 2024-10-09 LAB
BASOPHILS # BLD AUTO: 0.01 X10*3/UL (ref 0–0.1)
BASOPHILS NFR BLD AUTO: 0.1 %
EOSINOPHIL # BLD AUTO: 0.09 X10*3/UL (ref 0–0.4)
EOSINOPHIL NFR BLD AUTO: 1.3 %
ERYTHROCYTE [DISTWIDTH] IN BLOOD BY AUTOMATED COUNT: 13.2 % (ref 11.5–14.5)
HCT VFR BLD AUTO: 45.9 % (ref 41–52)
HGB BLD-MCNC: 14.9 G/DL (ref 13.5–17.5)
IMM GRANULOCYTES # BLD AUTO: 0.01 X10*3/UL (ref 0–0.5)
IMM GRANULOCYTES NFR BLD AUTO: 0.1 % (ref 0–0.9)
LYMPHOCYTES # BLD AUTO: 2.74 X10*3/UL (ref 0.8–3)
LYMPHOCYTES NFR BLD AUTO: 41 %
MCH RBC QN AUTO: 25.6 PG (ref 26–34)
MCHC RBC AUTO-ENTMCNC: 32.5 G/DL (ref 32–36)
MCV RBC AUTO: 79 FL (ref 80–100)
MONOCYTES # BLD AUTO: 0.65 X10*3/UL (ref 0.05–0.8)
MONOCYTES NFR BLD AUTO: 9.7 %
NEUTROPHILS # BLD AUTO: 3.19 X10*3/UL (ref 1.6–5.5)
NEUTROPHILS NFR BLD AUTO: 47.8 %
PLATELET # BLD AUTO: 200 X10*3/UL (ref 150–450)
RBC # BLD AUTO: 5.82 X10*6/UL (ref 4.5–5.9)
WBC # BLD AUTO: 6.7 X10*3/UL (ref 4.4–11.3)

## 2024-10-09 PROCEDURE — 85025 COMPLETE CBC W/AUTO DIFF WBC: CPT

## 2024-10-09 PROCEDURE — 99195 PHLEBOTOMY: CPT

## 2024-10-09 PROCEDURE — 36415 COLL VENOUS BLD VENIPUNCTURE: CPT

## 2024-10-09 RX ORDER — HEPARIN 100 UNIT/ML
500 SYRINGE INTRAVENOUS AS NEEDED
OUTPATIENT
Start: 2024-10-09

## 2024-10-09 RX ORDER — DIPHENHYDRAMINE HYDROCHLORIDE 50 MG/ML
50 INJECTION INTRAMUSCULAR; INTRAVENOUS AS NEEDED
OUTPATIENT
Start: 2024-10-15

## 2024-10-09 RX ORDER — FAMOTIDINE 10 MG/ML
20 INJECTION INTRAVENOUS ONCE AS NEEDED
OUTPATIENT
Start: 2024-10-15

## 2024-10-09 RX ORDER — ALBUTEROL SULFATE 0.83 MG/ML
3 SOLUTION RESPIRATORY (INHALATION) AS NEEDED
OUTPATIENT
Start: 2024-10-15

## 2024-10-09 RX ORDER — EPINEPHRINE 0.3 MG/.3ML
0.3 INJECTION SUBCUTANEOUS EVERY 5 MIN PRN
OUTPATIENT
Start: 2024-10-15

## 2024-10-09 RX ORDER — HEPARIN SODIUM,PORCINE/PF 10 UNIT/ML
50 SYRINGE (ML) INTRAVENOUS AS NEEDED
OUTPATIENT
Start: 2024-10-09

## 2024-10-09 ASSESSMENT — PAIN SCALES - GENERAL: PAINLEVEL: 0-NO PAIN

## 2024-10-09 NOTE — PROGRESS NOTES
Patient identified by name & .   Hematocrit 45.9; phleb completed- VSS pre and post phleb. Patient denies acute distress- none noted. Discharged home in stable condition. Returns  for labs, FUV and phleb. Verbalizes understanding.

## 2024-10-12 ENCOUNTER — PROCEDURE VISIT (OUTPATIENT)
Dept: SLEEP MEDICINE | Facility: HOSPITAL | Age: 79
End: 2024-10-12
Payer: MEDICARE

## 2024-10-12 DIAGNOSIS — G47.33 OBSTRUCTIVE SLEEP APNEA SYNDROME: ICD-10-CM

## 2024-10-12 PROCEDURE — 95806 SLEEP STUDY UNATT&RESP EFFT: CPT | Performed by: ALLERGY & IMMUNOLOGY

## 2024-10-28 ENCOUNTER — TELEPHONE (OUTPATIENT)
Dept: PRIMARY CARE | Facility: CLINIC | Age: 79
End: 2024-10-28
Payer: MEDICARE

## 2024-11-12 ENCOUNTER — HOSPITAL ENCOUNTER (OUTPATIENT)
Dept: CARDIOLOGY | Facility: HOSPITAL | Age: 79
Discharge: HOME | End: 2024-11-12
Payer: MEDICARE

## 2024-11-12 ENCOUNTER — APPOINTMENT (OUTPATIENT)
Dept: CARDIOLOGY | Facility: HOSPITAL | Age: 79
End: 2024-11-12
Payer: MEDICARE

## 2024-11-12 DIAGNOSIS — R00.1 BRADYCARDIA: ICD-10-CM

## 2024-11-12 DIAGNOSIS — I44.0 FIRST DEGREE HEART BLOCK: ICD-10-CM

## 2024-11-12 PROCEDURE — 93294 REM INTERROG EVL PM/LDLS PM: CPT | Performed by: INTERNAL MEDICINE

## 2024-11-12 PROCEDURE — 93296 REM INTERROG EVL PM/IDS: CPT

## 2024-11-13 ENCOUNTER — TELEPHONE (OUTPATIENT)
Dept: CARDIOLOGY | Facility: HOSPITAL | Age: 79
End: 2024-11-13
Payer: MEDICARE

## 2024-11-13 NOTE — TELEPHONE ENCOUNTER
11/13/24 1627  Dr. Lebron aware of alert. Last labs stable.    ----- Message from Nurse Greta CROOKS sent at 11/12/2024  9:53 AM EST -----  Regarding: AG-RED-rapid NSVT  Remote shows ·High Ventricular Rate detected- on 11.10.24 @5:56pm, 2 sec @233 bpm, EGM- rapid NSVT 8 beats, max V~275 bpm.  Spoke to patient and he states that he has occas brief palpitations (awareness only), but does not recall any at the time of this episode (11.10.24).

## 2024-11-27 ENCOUNTER — INFUSION (OUTPATIENT)
Dept: HEMATOLOGY/ONCOLOGY | Facility: CLINIC | Age: 79
End: 2024-11-27
Payer: MEDICARE

## 2024-11-27 VITALS
HEART RATE: 89 BPM | DIASTOLIC BLOOD PRESSURE: 84 MMHG | TEMPERATURE: 97.7 F | BODY MASS INDEX: 28.16 KG/M2 | RESPIRATION RATE: 16 BRPM | SYSTOLIC BLOOD PRESSURE: 116 MMHG | WEIGHT: 207.9 LBS | OXYGEN SATURATION: 97 % | HEIGHT: 72 IN

## 2024-11-27 DIAGNOSIS — D45 POLYCYTHEMIA VERA: ICD-10-CM

## 2024-11-27 LAB
BASOPHILS # BLD AUTO: 0.01 X10*3/UL (ref 0–0.1)
BASOPHILS NFR BLD AUTO: 0.1 %
EOSINOPHIL # BLD AUTO: 0.13 X10*3/UL (ref 0–0.4)
EOSINOPHIL NFR BLD AUTO: 1.8 %
ERYTHROCYTE [DISTWIDTH] IN BLOOD BY AUTOMATED COUNT: 14.3 % (ref 11.5–14.5)
HCT VFR BLD AUTO: 46.2 % (ref 41–52)
HGB BLD-MCNC: 14.8 G/DL (ref 13.5–17.5)
IMM GRANULOCYTES # BLD AUTO: 0 X10*3/UL (ref 0–0.5)
IMM GRANULOCYTES NFR BLD AUTO: 0 % (ref 0–0.9)
LYMPHOCYTES # BLD AUTO: 2.61 X10*3/UL (ref 0.8–3)
LYMPHOCYTES NFR BLD AUTO: 36 %
MCH RBC QN AUTO: 24.7 PG (ref 26–34)
MCHC RBC AUTO-ENTMCNC: 32 G/DL (ref 32–36)
MCV RBC AUTO: 77 FL (ref 80–100)
MONOCYTES # BLD AUTO: 0.55 X10*3/UL (ref 0.05–0.8)
MONOCYTES NFR BLD AUTO: 7.6 %
NEUTROPHILS # BLD AUTO: 3.94 X10*3/UL (ref 1.6–5.5)
NEUTROPHILS NFR BLD AUTO: 54.5 %
PLATELET # BLD AUTO: 189 X10*3/UL (ref 150–450)
RBC # BLD AUTO: 6 X10*6/UL (ref 4.5–5.9)
WBC # BLD AUTO: 7.2 X10*3/UL (ref 4.4–11.3)

## 2024-11-27 PROCEDURE — 36415 COLL VENOUS BLD VENIPUNCTURE: CPT

## 2024-11-27 PROCEDURE — 85025 COMPLETE CBC W/AUTO DIFF WBC: CPT

## 2024-11-27 PROCEDURE — 99195 PHLEBOTOMY: CPT

## 2024-11-27 RX ORDER — DIPHENHYDRAMINE HYDROCHLORIDE 50 MG/ML
50 INJECTION INTRAMUSCULAR; INTRAVENOUS AS NEEDED
OUTPATIENT
Start: 2024-12-08

## 2024-11-27 RX ORDER — FAMOTIDINE 10 MG/ML
20 INJECTION INTRAVENOUS ONCE AS NEEDED
Status: DISCONTINUED | OUTPATIENT
Start: 2024-11-27 | End: 2024-11-27 | Stop reason: HOSPADM

## 2024-11-27 RX ORDER — ALBUTEROL SULFATE 0.83 MG/ML
3 SOLUTION RESPIRATORY (INHALATION) AS NEEDED
Status: DISCONTINUED | OUTPATIENT
Start: 2024-11-27 | End: 2024-11-27 | Stop reason: HOSPADM

## 2024-11-27 RX ORDER — DIPHENHYDRAMINE HYDROCHLORIDE 50 MG/ML
50 INJECTION INTRAMUSCULAR; INTRAVENOUS AS NEEDED
Status: DISCONTINUED | OUTPATIENT
Start: 2024-11-27 | End: 2024-11-27 | Stop reason: HOSPADM

## 2024-11-27 RX ORDER — EPINEPHRINE 0.3 MG/.3ML
0.3 INJECTION SUBCUTANEOUS EVERY 5 MIN PRN
Status: DISCONTINUED | OUTPATIENT
Start: 2024-11-27 | End: 2024-11-27 | Stop reason: HOSPADM

## 2024-11-27 RX ORDER — EPINEPHRINE 0.3 MG/.3ML
0.3 INJECTION SUBCUTANEOUS EVERY 5 MIN PRN
OUTPATIENT
Start: 2024-12-08

## 2024-11-27 RX ORDER — FAMOTIDINE 10 MG/ML
20 INJECTION INTRAVENOUS ONCE AS NEEDED
OUTPATIENT
Start: 2024-12-08

## 2024-11-27 RX ORDER — HEPARIN SODIUM,PORCINE/PF 10 UNIT/ML
50 SYRINGE (ML) INTRAVENOUS AS NEEDED
OUTPATIENT
Start: 2024-11-27

## 2024-11-27 RX ORDER — ALBUTEROL SULFATE 0.83 MG/ML
3 SOLUTION RESPIRATORY (INHALATION) AS NEEDED
OUTPATIENT
Start: 2024-12-08

## 2024-11-27 RX ORDER — HEPARIN 100 UNIT/ML
500 SYRINGE INTRAVENOUS AS NEEDED
OUTPATIENT
Start: 2024-11-27

## 2024-11-27 ASSESSMENT — PAIN SCALES - GENERAL: PAINLEVEL_OUTOF10: 7

## 2024-11-27 NOTE — PROGRESS NOTES
Pt here for phleb at 7 weeks per pt request. Met parameters for phleb. 500cc removed. Tolerated procedure well. He is aware of plan of care, will call with further questions or concerns. Did not stay for 15 min watch period.

## 2024-12-04 ENCOUNTER — APPOINTMENT (OUTPATIENT)
Dept: HEMATOLOGY/ONCOLOGY | Facility: CLINIC | Age: 79
End: 2024-12-04
Payer: MEDICARE

## 2025-01-09 ENCOUNTER — HOSPITAL ENCOUNTER (OUTPATIENT)
Dept: CARDIOLOGY | Facility: HOSPITAL | Age: 80
Discharge: HOME | End: 2025-01-09
Payer: MEDICARE

## 2025-01-09 DIAGNOSIS — R00.1 BRADYCARDIA: ICD-10-CM

## 2025-01-09 DIAGNOSIS — I44.0 FIRST DEGREE HEART BLOCK: ICD-10-CM

## 2025-01-09 DIAGNOSIS — R00.1 BRADYCARDIA: Primary | ICD-10-CM

## 2025-01-14 ENCOUNTER — HOSPITAL ENCOUNTER (OUTPATIENT)
Dept: CARDIOLOGY | Facility: HOSPITAL | Age: 80
Discharge: HOME | End: 2025-01-14
Payer: MEDICARE

## 2025-01-14 ENCOUNTER — TELEPHONE (OUTPATIENT)
Dept: CARDIOLOGY | Facility: HOSPITAL | Age: 80
End: 2025-01-14

## 2025-01-14 ENCOUNTER — INFUSION (OUTPATIENT)
Dept: HEMATOLOGY/ONCOLOGY | Facility: CLINIC | Age: 80
End: 2025-01-14
Payer: MEDICARE

## 2025-01-14 VITALS
HEART RATE: 57 BPM | HEIGHT: 72 IN | DIASTOLIC BLOOD PRESSURE: 71 MMHG | RESPIRATION RATE: 16 BRPM | TEMPERATURE: 96.8 F | WEIGHT: 209 LBS | SYSTOLIC BLOOD PRESSURE: 144 MMHG | OXYGEN SATURATION: 99 % | BODY MASS INDEX: 28.31 KG/M2

## 2025-01-14 DIAGNOSIS — D50.9 IRON DEFICIENCY ANEMIA, UNSPECIFIED IRON DEFICIENCY ANEMIA TYPE: ICD-10-CM

## 2025-01-14 DIAGNOSIS — R00.1 BRADYCARDIA: ICD-10-CM

## 2025-01-14 DIAGNOSIS — D45 POLYCYTHEMIA VERA: Primary | ICD-10-CM

## 2025-01-14 DIAGNOSIS — D75.1 SECONDARY POLYCYTHEMIA: ICD-10-CM

## 2025-01-14 DIAGNOSIS — I44.0 FIRST DEGREE HEART BLOCK: ICD-10-CM

## 2025-01-14 DIAGNOSIS — R53.82 CHRONIC FATIGUE: ICD-10-CM

## 2025-01-14 LAB
BASOPHILS # BLD AUTO: 0.02 X10*3/UL (ref 0–0.1)
BASOPHILS NFR BLD AUTO: 0.3 %
BODY SURFACE AREA: 2.2 M2
EOSINOPHIL # BLD AUTO: 0.13 X10*3/UL (ref 0–0.4)
EOSINOPHIL NFR BLD AUTO: 2 %
ERYTHROCYTE [DISTWIDTH] IN BLOOD BY AUTOMATED COUNT: 14.8 % (ref 11.5–14.5)
HCT VFR BLD AUTO: 47 % (ref 41–52)
HGB BLD-MCNC: 14.7 G/DL (ref 13.5–17.5)
IMM GRANULOCYTES # BLD AUTO: 0.01 X10*3/UL (ref 0–0.5)
IMM GRANULOCYTES NFR BLD AUTO: 0.2 % (ref 0–0.9)
IRON SATN MFR SERPL: 6 % (ref 25–45)
IRON SERPL-MCNC: 30 UG/DL (ref 35–150)
LYMPHOCYTES # BLD AUTO: 2.01 X10*3/UL (ref 0.8–3)
LYMPHOCYTES NFR BLD AUTO: 30.5 %
MCH RBC QN AUTO: 23.9 PG (ref 26–34)
MCHC RBC AUTO-ENTMCNC: 31.3 G/DL (ref 32–36)
MCV RBC AUTO: 77 FL (ref 80–100)
MONOCYTES # BLD AUTO: 0.67 X10*3/UL (ref 0.05–0.8)
MONOCYTES NFR BLD AUTO: 10.2 %
NEUTROPHILS # BLD AUTO: 3.74 X10*3/UL (ref 1.6–5.5)
NEUTROPHILS NFR BLD AUTO: 56.8 %
PLATELET # BLD AUTO: 188 X10*3/UL (ref 150–450)
RBC # BLD AUTO: 6.14 X10*6/UL (ref 4.5–5.9)
TIBC SERPL-MCNC: 463 UG/DL (ref 240–445)
UIBC SERPL-MCNC: 433 UG/DL (ref 110–370)
WBC # BLD AUTO: 6.6 X10*3/UL (ref 4.4–11.3)

## 2025-01-14 PROCEDURE — 83540 ASSAY OF IRON: CPT

## 2025-01-14 PROCEDURE — 99195 PHLEBOTOMY: CPT

## 2025-01-14 PROCEDURE — 85025 COMPLETE CBC W/AUTO DIFF WBC: CPT

## 2025-01-14 PROCEDURE — 36415 COLL VENOUS BLD VENIPUNCTURE: CPT

## 2025-01-14 RX ORDER — FAMOTIDINE 10 MG/ML
20 INJECTION INTRAVENOUS ONCE AS NEEDED
OUTPATIENT
Start: 2025-01-21

## 2025-01-14 RX ORDER — EPINEPHRINE 0.3 MG/.3ML
0.3 INJECTION SUBCUTANEOUS EVERY 5 MIN PRN
OUTPATIENT
Start: 2025-01-21

## 2025-01-14 RX ORDER — HEPARIN SODIUM,PORCINE/PF 10 UNIT/ML
50 SYRINGE (ML) INTRAVENOUS AS NEEDED
OUTPATIENT
Start: 2025-01-14

## 2025-01-14 RX ORDER — ALBUTEROL SULFATE 0.83 MG/ML
3 SOLUTION RESPIRATORY (INHALATION) AS NEEDED
OUTPATIENT
Start: 2025-01-21

## 2025-01-14 RX ORDER — HEPARIN 100 UNIT/ML
500 SYRINGE INTRAVENOUS AS NEEDED
OUTPATIENT
Start: 2025-01-14

## 2025-01-14 RX ORDER — DIPHENHYDRAMINE HYDROCHLORIDE 50 MG/ML
50 INJECTION INTRAMUSCULAR; INTRAVENOUS AS NEEDED
OUTPATIENT
Start: 2025-01-21

## 2025-01-14 ASSESSMENT — PAIN SCALES - GENERAL: PAINLEVEL_OUTOF10: 0-NO PAIN

## 2025-01-14 NOTE — PROGRESS NOTES
pt tolerated today's therapeutic phlebotomy without difficulty. VS stable post phleb. pt to call with any questions and/or concerns.

## 2025-01-14 NOTE — TELEPHONE ENCOUNTER
----- Message from Carlos Rios sent at 1/14/2025  4:09 PM EST -----  Regarding: RE: AG-orange-recurrent NSVT-pacemaker  Marycruz had ordered an echo for him in nthe September visit. Lets get the echo done now and lets see what Corina says  ----- Message -----  From: Gisel Hanna RN  Sent: 1/14/2025   3:17 PM EST  To: Corina Yepez APRN-CNP; Carlos Rios MD  Subject: FW: AG-orange-recurrent NSVT-pacemaker             ----- Message -----  From: Greta Clarke RN  Sent: 1/14/2025   3:09 PM EST  To: Roseline Pb100 Card1 Clinical Support Pool  Subject: AG-orange-recurrent NSVT-pacemaker               Pls see below from remote- thx.     1 new VHR (1.10.25 @720pm), 2 sec @217 bpm, EGM- NSVT 9 beats, max V~238 bpm.  LMOM for return call.

## 2025-01-17 NOTE — TELEPHONE ENCOUNTER
Rn called pt at this time regarding results. No answer at this time. RN left message for patient to return call, Also sent a UNATIONt message.      ----- Message from Carlos Rios sent at 1/14/2025  4:09 PM EST -----  Regarding: RE: AG-orange-recurrent NSVT-pacemaker  Marycruz had ordered an echo for him in nthe September visit. Lets get the echo done now and lets see what Corina says  ----- Message -----  From: Gisel Hanna RN  Sent: 1/14/2025   3:17 PM EST  To: Corina Yepez, APRN-CNP; Carlos Rios MD  Subject: FW: AG-orange-recurrent NSVT-pacemaker             ----- Message -----  From: Greta Clarke RN  Sent: 1/14/2025   3:09 PM EST  To: Por Pb100 Card1 Clinical Support Pool  Subject: AG-orange-recurrent NSVT-pacemaker               Pls see below from remote- x.     1 new VHR (1.10.25 @720pm), 2 sec @217 bpm, EGM- NSVT 9 beats, max V~238 bpm.  LMOM for return call.

## 2025-01-20 ENCOUNTER — APPOINTMENT (OUTPATIENT)
Dept: AUDIOLOGY | Facility: HOSPITAL | Age: 80
End: 2025-01-20
Payer: MEDICARE

## 2025-01-20 ENCOUNTER — TELEPHONE (OUTPATIENT)
Dept: CARDIOLOGY | Facility: HOSPITAL | Age: 80
End: 2025-01-20

## 2025-01-20 DIAGNOSIS — H90.3 SENSORINEURAL HEARING LOSS, BILATERAL: Primary | ICD-10-CM

## 2025-01-20 PROCEDURE — V5299 HEARING SERVICE: HCPCS | Mod: AUDSP | Performed by: AUDIOLOGIST

## 2025-01-20 ASSESSMENT — PAIN SCALES - GENERAL: PAINLEVEL_OUTOF10: 0 - NO PAIN

## 2025-01-20 ASSESSMENT — PAIN - FUNCTIONAL ASSESSMENT: PAIN_FUNCTIONAL_ASSESSMENT: 0-10

## 2025-01-20 NOTE — PROGRESS NOTES
AUDIOLOGY HEARING AID CHECK      Name:  Domo Kelley  :  1945  Age:  79 y.o.  Date of Appointment:  2025     History:  Mr. Kelley is seen today for Hearing Aid Check  His only concern today is that he was unable to replace the retention  appropriately after cleaning the hearing aids.     For a period of time about one month ago, the left hearing aid was not working.  After he cleaned it, though, it seemed to work well.    He still reports improvement of tinnitus symptoms when wearing the hearing aids.    Current hearing aids:  Purchased: 2023  Hearing Aids:  Phonak Audeo L50R  Right # [id number]    Left # [id number]  Speakers: #1M right, #2M left   (5) with cerustop  Domes: medium vented    Repair/loss warranty ends: 2026  Last hearing evaluation: 2023    Upon initial examination, the left hearing aid was very weak and the right hearing aid sounded good    Cleaned and checked both devices.    Replaced receivers to #5 due to the old #4 being discontinued  Replaced filters bilaterally.  Gave extra supplies to patient. And demonstrated use  Replaced domes bilaterally.  Gave extra supplies to patient.     Both hearing aids sound and look good on final examination.      Otoscopic observation revealed clear ear canals bilaterally.    Per data logging, this patient wears the hearing aids 4-5 hours / day on average.  He does not wear his hearing aids when outside working with loud equipment.  He is comfortable with his usage schedule.        IMPRESSIONS:  Both hearing aids function appropriately following today's routine maintenance procedures.  The patient also reports the aids sound good.      RECOMMENDATIONS:  -Daily use of hearing aids.  -Daily maintenance to be performed at home.   -Follow-up hearing aid check in 6-12 months or as needed.  Appointment is scheduled on 2026 at 8:30am.     PATIENT EDUCATION:   Discussed above information with Mr. Kelley.  Questions were  addressed and the patient was encouraged to contact our department should concerns arise.    Time:  08:30 to 08:50      LORRI Gonzalez, CCC-A  Senior Audiologist

## 2025-01-20 NOTE — TELEPHONE ENCOUNTER
Echo was scheduled and appointment rescheduled after echo.      ----- Message from Carlos Rios sent at 1/14/2025  4:09 PM EST -----  Regarding: RE: AG-orange-recurrent NSVT-pacemaker  Marycruz had ordered an echo for him in nthe September visit. Lets get the echo done now and lets see what Corina says  ----- Message -----  From: Gisel Hanna RN  Sent: 1/14/2025   3:17 PM EST  To: Corina Yepez, APRN-CNP; Carlos Rios MD  Subject: FW: AG-orange-recurrent NSVT-pacemaker             ----- Message -----  From: Greta Clarke RN  Sent: 1/14/2025   3:09 PM EST  To: Roseline Pb100 Card1 Clinical Support Pool  Subject: AG-orange-recurrent NSVT-pacemaker               Pls see below from remote- thx.     1 new VHR (1.10.25 @720pm), 2 sec @217 bpm, EGM- NSVT 9 beats, max V~238 bpm.  LMOM for return call.

## 2025-01-21 ENCOUNTER — TELEPHONE (OUTPATIENT)
Dept: CARDIOLOGY | Facility: HOSPITAL | Age: 80
End: 2025-01-21
Payer: MEDICARE

## 2025-01-21 NOTE — TELEPHONE ENCOUNTER
Patient called in to go over his results.    Best contact confirmed.   Patient understood    Routed to RN pool.      Thank you!  Raudel GRANADO

## 2025-02-04 ENCOUNTER — APPOINTMENT (OUTPATIENT)
Dept: HEMATOLOGY/ONCOLOGY | Facility: CLINIC | Age: 80
End: 2025-02-04
Payer: MEDICARE

## 2025-02-06 ENCOUNTER — HOSPITAL ENCOUNTER (OUTPATIENT)
Dept: CARDIOLOGY | Facility: HOSPITAL | Age: 80
Discharge: HOME | End: 2025-02-06
Payer: MEDICARE

## 2025-02-06 DIAGNOSIS — I77.810 AORTIC ROOT DILATATION (CMS-HCC): ICD-10-CM

## 2025-02-06 DIAGNOSIS — I50.32 HEART FAILURE WITH IMPROVED EJECTION FRACTION (HFIMPEF): ICD-10-CM

## 2025-02-06 LAB
EJECTION FRACTION APICAL 4 CHAMBER: 54
EJECTION FRACTION: 54 %
GLOBAL LONGITUDINAL STRAIN: 15.7 %
LEFT ATRIUM VOLUME AREA LENGTH INDEX BSA: 34.5 ML/M2
LEFT VENTRICLE INTERNAL DIMENSION DIASTOLE: 5.5 CM (ref 3.5–6)
LEFT VENTRICULAR OUTFLOW TRACT DIAMETER: 2.37 CM
LV EJECTION FRACTION BIPLANE: 54 %
MITRAL VALVE E/A RATIO: 0.85
RIGHT VENTRICLE FREE WALL PEAK S': 12.55 CM/S
RIGHT VENTRICLE PEAK SYSTOLIC PRESSURE: 24.6 MMHG
TRICUSPID ANNULAR PLANE SYSTOLIC EXCURSION: 2.1 CM

## 2025-02-06 PROCEDURE — 93356 MYOCRD STRAIN IMG SPCKL TRCK: CPT

## 2025-02-06 PROCEDURE — 93356 MYOCRD STRAIN IMG SPCKL TRCK: CPT | Performed by: INTERNAL MEDICINE

## 2025-02-06 PROCEDURE — 93306 TTE W/DOPPLER COMPLETE: CPT | Performed by: INTERNAL MEDICINE

## 2025-02-13 ENCOUNTER — TELEPHONE (OUTPATIENT)
Dept: CARDIOLOGY | Facility: HOSPITAL | Age: 80
End: 2025-02-13
Payer: MEDICARE

## 2025-02-13 DIAGNOSIS — I25.10 CORONARY ARTERY DISEASE INVOLVING NATIVE CORONARY ARTERY OF NATIVE HEART, UNSPECIFIED WHETHER ANGINA PRESENT: Primary | ICD-10-CM

## 2025-02-14 NOTE — TELEPHONE ENCOUNTER
Patient informed Dr Rios ordering a  CT Angio for further evaluation of CAD progression. BMP order placed. Patient advised to have lab completed a few days prior to test.

## 2025-02-14 NOTE — TELEPHONE ENCOUNTER
Received message from Corina DENNISON to schedule visit regarding NSVT on recent device check - pt agreeable to 2/17 10am Foard.

## 2025-02-14 NOTE — TELEPHONE ENCOUNTER
----- Message -----  From: Carlos Rios MD  Sent: 2/13/2025   2:54 PM EST  To: JACKLYN Ross-CNP; *  Subject: RE: AG-orange-recurrent NSVT-pacemaker          Lets also get a CT angio of  coronaries to R/O progression of CAD  ----- Message -----  From: KALI Ross  Sent: 2/13/2025  10:31 AM EST  To: Gisel Hanna RN; Carlos Rios MD; *  Subject: RE: AG-orange-recurrent NSVT-pacemaker          Please schedule patient to see me for NSVT noted on device check  ----- Message -----  From: Gisel Hanna RN  Sent: 1/14/2025   3:17 PM EST  To: KALI Ross; Carlos Rios MD  Subject: FW: AG-orange-recurrent NSVT-pacemaker            ----- Message -----  From: Greta Clarke RN  Sent: 1/14/2025   3:09 PM EST  To: Roseline Pb100 Card1 Clinical Support Pool  Subject: AG-orange-recurrent NSVT-pacemaker              Pls see below from remote- thx.    1 new VHR (1.10.25 @720pm), 2 sec @217 bpm, EGM- NSVT 9 beats, max V~238 bpm.  LMOM for return call.

## 2025-02-14 NOTE — TELEPHONE ENCOUNTER
Called patient to review medications for upcoming visit with edward. Left message with appt reminder and to bring updated medication list

## 2025-02-17 ENCOUNTER — APPOINTMENT (OUTPATIENT)
Dept: CARDIOLOGY | Facility: HOSPITAL | Age: 80
End: 2025-02-17
Payer: MEDICARE

## 2025-02-17 NOTE — PROGRESS NOTES
Electrophysiology Follow up Visit    History of Present Illness:  Domo Kelley is a 79 y.o. year old male patient with    Aortic root dilation: Echocardiogram February 2024 showed mild dilation of ascending aorta 3.8cm and aortic root  Hypertension  JULIETA  NICM  Diabetes  Bradycardia with conduction system disease: We originally saw patient in 2019 with asymptomatic bradycardia at that time, noted on monitor which also noted sinus pause of 2.4 seconds. No episodes of AV block noted. ECG at that time was sinus with RBBB, LAFB, and prolonged MO which placed him at risk of developing high degree AV block. At that time no indication for permanent pacing. Patient referred back as he has experienced feeling of faintness, lasting 1-2 minutes. We saw patient in April 2024.  He had trifascicular block and developed symptoms including lightheadedness, fogginess and near syncope.  He was not on any rate slowing medications.         5/15/2024 successful implant of [employer] dual-chamber pacemaker for trifascicular block   7. NSVT: Noted on device check in November 2024 and January 2025.     Patient referred back for NSVT seen on device check. He notes increased fatigue over the past year. He also notes intermittent lightheadedness which occurs daily with position change, resting or with ambulation. He denies falls, syncope or near syncope. He denies SOB, edema, or palpitations.       Medical History:  He has a past medical history of Abnormal result of other cardiovascular function study (09/16/2019), Anxiety disorder, unspecified, Aortic root dilation (CMS-HCC), Bradycardia, Coronary angioplasty status (10/04/2019), Encounter for screening for malignant neoplasm of colon (10/10/2017), Hyperlipidemia, Hypertension, JULIETA (obstructive sleep apnea), Personal history of other diseases of the circulatory system, Personal history of other diseases of the circulatory system (01/06/2020), Personal history of other diseases of the  musculoskeletal system and connective tissue, Personal history of other diseases of the respiratory system, Personal history of other malignant neoplasm of skin, Personal history of other specified conditions (07/20/2019), and Shortness of breath (10/07/2019).    Surgical History:  He has a past surgical history that includes Hand surgery (10/10/2017); Other surgical history (10/10/2017); Tonsillectomy (10/10/2017); Other surgical history (07/17/2019); and Cardiac electrophysiology procedure (N/A, 5/15/2024).     Social History:  He reports that he has quit smoking. His smoking use included cigarettes. He has never used smokeless tobacco. He reports current alcohol use. He reports that he does not use drugs.         Family History   Problem Relation Name Age of Onset    Breast cancer Mother      Skin cancer Father          ROS:  A 14 point review of systems was done and is negative other than as stated in HPI    Physical Exam  Constitutional:       Appearance: Normal appearance.   Eyes:      Pupils: Pupils are equal, round, and reactive to light.   Cardiovascular:      Rate and Rhythm: Normal rate and regular rhythm.      Heart sounds: Normal heart sounds.   Pulmonary:      Effort: Pulmonary effort is normal.      Breath sounds: Normal breath sounds.   Musculoskeletal:         General: Normal range of motion.   Skin:     General: Skin is warm and dry.   Neurological:      Mental Status: He is alert and oriented to person, place, and time.       Allergies:  Tetanus vaccines and toxoid    Outpatient Medications:  Current Outpatient Medications   Medication Instructions    atorvastatin (LIPITOR) 10 mg, oral, Daily    berberine/herbal complex no.18 (BERBERINE-HERBAL COMB NO.18 ORAL) 1,000 mg, Daily    blood-glucose sensor (FreeStyle Carmela 3 Sensor) device Use 1 sensor q 14 days    chlorthalidone (HYGROTON) 25 mg, oral, Daily    cyanocobalamin (Vitamin B-12) 1,000 mcg tablet 1 tablet, Daily    losartan (COZAAR) 25 mg,  "oral, Daily    metFORMIN  mg 24 hr tablet TAKE 2 TABLETS BY MOUTH TWICE A DAY    Trulicity 0.75 mg, subcutaneous, Once Weekly    ZINC ORAL Take by mouth.         Reviewed Labs:  CBC  Lab Results   Component Value Date    WBC 6.6 01/14/2025    HGB 14.7 01/14/2025    HCT 47.0 01/14/2025    MCV 77 (L) 01/14/2025     01/14/2025        Renal Function Panel  Lab Results   Component Value Date    GLUCOSE 168 (H) 09/10/2024     09/10/2024    K 3.9 09/10/2024     09/10/2024    CO2 30 09/10/2024    ANIONGAP 10 09/10/2024    BUN 21 09/10/2024    CREATININE 0.90 09/10/2024    GFRMALE 85 07/25/2023    CALCIUM 9.2 09/10/2024        CMP  Lab Results   Component Value Date    CALCIUM 9.2 09/10/2024    PROT 6.9 09/10/2024    ALBUMIN 4.4 09/10/2024    AST 17 09/10/2024    ALT 19 09/10/2024    ALKPHOS 66 09/10/2024    BILITOT 0.7 09/10/2024        Mg   No results found for: \"MG\"     Thyroid  Lab Results   Component Value Date    TSH 2.32 01/19/2022        Visit Vitals  /74   Pulse 75   Ht 1.836 m (6' 0.28\")   Wt 94.8 kg (209 lb)   BMI 28.13 kg/m²   Smoking Status Former   BSA 2.2 m²           Cardiac Testing Reviewed Study(s):  Echo (February/2025):  CONCLUSIONS:   1. The left ventricular systolic function is normal, with a Sepulveda's biplane calculated ejection fraction of 54%.   2. No regional wall motion abnormalities.   3. Spectral Doppler shows a Grade I (impaired relaxation pattern) of left ventricular diastolic filling with normal left atrial filling pressure.   4. There is normal right ventricular global systolic function.   5. Aortic valve stenosis is not present.   6. Left Ventricular Global Longitudinal Strain - 15.7 %.  Echo (February/2024):   1. Left ventricular systolic function is normal.  2. Mild dilatation of the ascending aorta (3.68 cm)  3. Mild dilatation of the aortic root   Echo (June/2023):   1. Left ventricular systolic function is normal with a 60-65% estimated ejection " fraction.  2. There is moderate dilatation of the aortic root.  Cath (September/2019):   Mild non obstructive CAD  ECGs (reviewed and my interpretation):   4/1/2024 sinus bradycardia with rate of 54 with prolonged CO 316ms, RBBB, LAFB  5/31/2024 sinus rhythm with rate of 68, prolonged CO, RBBB, LAFB  2/19/2025 sinus rhythm with prolonged CO, RBBB, LAFB    Assessment  NSVT: ECG personally reviewed which shows sinus rhythm with no ectopy. 9 beat NSVT noted on device check last month. Echocardiogram reviewed from earlier this month which shows normal LV function and no WMA. Coronary CTA scheduled next week for ischemic evaluation. We will start beta blocker. No further treatment needed if coronary CTA is negative for significant CAD. We also check magnesium level. Device check scheduled in April. We will follow up with patient in 6 months.     Dual chamber pacemaker: Device check shows pacemaker functioning properly. Minimal RV pacing. 10 years remaining on battery life. Left chest device site well healed with no erythema or drainage. Device check scheduled in April.     Plan  Start metoprolol suc 25mg daily  Labs ordered  Coronary CTA scheduled next week  Follow up with device clinic as scheduled  Follow up 6 months       Exclusive of any other services or procedures performed, Corina PORTER, JACKLYN-CNP , spent 40 minutes in duration for this visit today.  This time consisted of chart review, obtaining history, and/or performing the exam as documented above as well as documenting the clinical information for the encounter in the electronic record, discussing treatment options, plans, and/or goals with patient, family, and/or caregiver, refilling medications, updating the electronic record, ordering medicines, lab work, imaging, referrals, and/or procedures as documented above and communicating with other [facility] professionals. I have discussed the results of laboratory, radiology, and cardiology studies with  the patient and their family/caregiver.

## 2025-02-17 NOTE — PROGRESS NOTES
Electrophysiology Follow up Visit    History of Present Illness:  Domo Kelley is a 79 y.o. year old male patient with    Aortic root dilation: Echocardiogram February 2024 showed mild dilation of ascending aorta 3.8cm and aortic root  Hypertension  JULIETA  NICM  Diabetes  Bradycardia with conduction system disease: We originally saw patient in 2019 with asymptomatic bradycardia at that time, noted on monitor which also noted sinus pause of 2.4 seconds. No episodes of AV block noted. ECG at that time was sinus with RBBB, LAFB, and prolonged MA which placed him at risk of developing high degree AV block. At that time no indication for permanent pacing. Patient referred back as he has experienced feeling of faintness, lasting 1-2 minutes. We saw patient in April 2024.  He had trifascicular block and developed symptoms including lightheadedness, fogginess and near syncope.  He was not on any rate slowing medications.         5/15/2024 successful implant of [employer] dual-chamber pacemaker for trifascicular block   7. NSVT: Noted on device check.       Medical History:  He has a past medical history of Abnormal result of other cardiovascular function study (09/16/2019), Anxiety disorder, unspecified, Aortic root dilation (CMS-HCC), Bradycardia, Coronary angioplasty status (10/04/2019), Encounter for screening for malignant neoplasm of colon (10/10/2017), Hyperlipidemia, Hypertension, JULIETA (obstructive sleep apnea), Personal history of other diseases of the circulatory system, Personal history of other diseases of the circulatory system (01/06/2020), Personal history of other diseases of the musculoskeletal system and connective tissue, Personal history of other diseases of the respiratory system, Personal history of other malignant neoplasm of skin, Personal history of other specified conditions (07/20/2019), and Shortness of breath (10/07/2019).    Surgical History:  He has a past surgical history that includes  Hand surgery (10/10/2017); Other surgical history (10/10/2017); Tonsillectomy (10/10/2017); Other surgical history (07/17/2019); and Cardiac electrophysiology procedure (N/A, 5/15/2024).     Social History:  He reports that he has quit smoking. His smoking use included cigarettes. He has never used smokeless tobacco. He reports current alcohol use. He reports that he does not use drugs.         Family History   Problem Relation Name Age of Onset    Breast cancer Mother      Skin cancer Father          ROS:  A 14 point review of systems was done and is negative other than as stated in HPI    Physical Exam    Allergies:  Tetanus vaccines and toxoid    Outpatient Medications:  Current Outpatient Medications   Medication Instructions    atorvastatin (LIPITOR) 10 mg, oral, Daily    berberine/herbal complex no.18 (BERBERINE-HERBAL COMB NO.18 ORAL) 1,000 mg, Daily    blood-glucose sensor (FreeStyle Carmela 3 Sensor) device Use 1 sensor q 14 days    chlorthalidone (HYGROTON) 25 mg, oral, Daily    cyanocobalamin (Vitamin B-12) 1,000 mcg tablet 1 tablet, Daily    losartan (COZAAR) 25 mg, oral, Daily    metFORMIN  mg 24 hr tablet TAKE 2 TABLETS BY MOUTH TWICE A DAY    Trulicity 0.75 mg, subcutaneous, Once Weekly    ZINC ORAL Take by mouth.         Reviewed Labs:  CBC  Lab Results   Component Value Date    WBC 6.6 01/14/2025    HGB 14.7 01/14/2025    HCT 47.0 01/14/2025    MCV 77 (L) 01/14/2025     01/14/2025        Renal Function Panel  Lab Results   Component Value Date    GLUCOSE 168 (H) 09/10/2024     09/10/2024    K 3.9 09/10/2024     09/10/2024    CO2 30 09/10/2024    ANIONGAP 10 09/10/2024    BUN 21 09/10/2024    CREATININE 0.90 09/10/2024    GFRMALE 85 07/25/2023    CALCIUM 9.2 09/10/2024        CMP  Lab Results   Component Value Date    CALCIUM 9.2 09/10/2024    PROT 6.9 09/10/2024    ALBUMIN 4.4 09/10/2024    AST 17 09/10/2024    ALT 19 09/10/2024    ALKPHOS 66 09/10/2024    BILITOT 0.7  "09/10/2024        Mg   No results found for: \"MG\"     Thyroid  Lab Results   Component Value Date    TSH 2.32 01/19/2022        Visit Vitals  Smoking Status Former           Cardiac Testing Reviewed Study(s):  Echo (February/2025):  CONCLUSIONS:   1. The left ventricular systolic function is normal, with a Sepulveda's biplane calculated ejection fraction of 54%.   2. No regional wall motion abnormalities.   3. Spectral Doppler shows a Grade I (impaired relaxation pattern) of left ventricular diastolic filling with normal left atrial filling pressure.   4. There is normal right ventricular global systolic function.   5. Aortic valve stenosis is not present.   6. Left Ventricular Global Longitudinal Strain - 15.7 %.  Echo (February/2024):   1. Left ventricular systolic function is normal.  2. Mild dilatation of the ascending aorta (3.68 cm)  3. Mild dilatation of the aortic root   Echo (June/2023):   1. Left ventricular systolic function is normal with a 60-65% estimated ejection fraction.  2. There is moderate dilatation of the aortic root.  Stress Test ({MONTHS OF THE YEAR:23015}/***):   ***  ECGs (reviewed and my interpretation):   4/1/2024 sinus bradycardia with rate of 54 with prolonged CA 316ms, RBBB, LAFB  5/31/2024 sinus rhythm with rate of 68, prolonged CA, RBBB, LAFB      Assessment      Plan        Exclusive of any other services or procedures performed, Corina PORTER APRN-CNP , spent 30 minutes in duration for this visit today.  This time consisted of chart review, obtaining history, and/or performing the exam as documented above as well as documenting the clinical information for the encounter in the electronic record, discussing treatment options, plans, and/or goals with patient, family, and/or caregiver, refilling medications, updating the electronic record, ordering medicines, lab work, imaging, referrals, and/or procedures as documented above and communicating with other [facility]" professionals. I have discussed the results of laboratory, radiology, and cardiology studies with the patient and their family/caregiver.      MICU

## 2025-02-19 ENCOUNTER — OFFICE VISIT (OUTPATIENT)
Dept: CARDIOLOGY | Facility: HOSPITAL | Age: 80
End: 2025-02-19
Payer: MEDICARE

## 2025-02-19 VITALS
BODY MASS INDEX: 28.31 KG/M2 | WEIGHT: 209 LBS | DIASTOLIC BLOOD PRESSURE: 74 MMHG | HEIGHT: 72 IN | SYSTOLIC BLOOD PRESSURE: 130 MMHG | HEART RATE: 75 BPM

## 2025-02-19 DIAGNOSIS — R00.1 BRADYCARDIA: ICD-10-CM

## 2025-02-19 DIAGNOSIS — I47.29 NSVT (NONSUSTAINED VENTRICULAR TACHYCARDIA) (MULTI): Primary | ICD-10-CM

## 2025-02-19 DIAGNOSIS — Z95.0 PRESENCE OF CARDIAC PACEMAKER: ICD-10-CM

## 2025-02-19 LAB
ATRIAL RATE: 75 BPM
P AXIS: 39 DEGREES
P OFFSET: 115 MS
P ONSET: 46 MS
PR INTERVAL: 316 MS
Q ONSET: 204 MS
QRS COUNT: 13 BEATS
QRS DURATION: 166 MS
QT INTERVAL: 406 MS
QTC CALCULATION(BAZETT): 453 MS
QTC FREDERICIA: 437 MS
R AXIS: -82 DEGREES
T AXIS: 18 DEGREES
T OFFSET: 407 MS
VENTRICULAR RATE: 75 BPM

## 2025-02-19 PROCEDURE — 3078F DIAST BP <80 MM HG: CPT | Performed by: NURSE PRACTITIONER

## 2025-02-19 PROCEDURE — 1159F MED LIST DOCD IN RCRD: CPT | Performed by: NURSE PRACTITIONER

## 2025-02-19 PROCEDURE — 93005 ELECTROCARDIOGRAM TRACING: CPT | Performed by: NURSE PRACTITIONER

## 2025-02-19 PROCEDURE — 1160F RVW MEDS BY RX/DR IN RCRD: CPT | Performed by: NURSE PRACTITIONER

## 2025-02-19 PROCEDURE — 1036F TOBACCO NON-USER: CPT | Performed by: NURSE PRACTITIONER

## 2025-02-19 PROCEDURE — 1123F ACP DISCUSS/DSCN MKR DOCD: CPT | Performed by: NURSE PRACTITIONER

## 2025-02-19 PROCEDURE — 93010 ELECTROCARDIOGRAM REPORT: CPT | Performed by: INTERNAL MEDICINE

## 2025-02-19 PROCEDURE — 99214 OFFICE O/P EST MOD 30 MIN: CPT | Mod: 25 | Performed by: NURSE PRACTITIONER

## 2025-02-19 PROCEDURE — 99214 OFFICE O/P EST MOD 30 MIN: CPT | Performed by: NURSE PRACTITIONER

## 2025-02-19 PROCEDURE — 3075F SYST BP GE 130 - 139MM HG: CPT | Performed by: NURSE PRACTITIONER

## 2025-02-19 RX ORDER — METOPROLOL SUCCINATE 25 MG/1
25 TABLET, EXTENDED RELEASE ORAL DAILY
Qty: 90 TABLET | Refills: 3 | Status: SHIPPED | OUTPATIENT
Start: 2025-02-19 | End: 2026-02-19

## 2025-02-19 NOTE — PATIENT INSTRUCTIONS
Start metoprolol suc 25mg daily  Labs ordered  Coronary CTA scheduled next week  Follow up with device clinic as scheduled  Follow up 6 months

## 2025-02-25 ENCOUNTER — LAB (OUTPATIENT)
Dept: LAB | Facility: HOSPITAL | Age: 80
End: 2025-02-25
Payer: MEDICARE

## 2025-02-25 ENCOUNTER — INFUSION (OUTPATIENT)
Dept: HEMATOLOGY/ONCOLOGY | Facility: CLINIC | Age: 80
End: 2025-02-25
Payer: MEDICARE

## 2025-02-25 ENCOUNTER — OFFICE VISIT (OUTPATIENT)
Dept: HEMATOLOGY/ONCOLOGY | Facility: CLINIC | Age: 80
End: 2025-02-25
Payer: MEDICARE

## 2025-02-25 VITALS — SYSTOLIC BLOOD PRESSURE: 125 MMHG | DIASTOLIC BLOOD PRESSURE: 70 MMHG | HEART RATE: 71 BPM

## 2025-02-25 VITALS
SYSTOLIC BLOOD PRESSURE: 111 MMHG | TEMPERATURE: 98.1 F | OXYGEN SATURATION: 96 % | HEART RATE: 60 BPM | WEIGHT: 211.86 LBS | BODY MASS INDEX: 28.51 KG/M2 | DIASTOLIC BLOOD PRESSURE: 69 MMHG | RESPIRATION RATE: 16 BRPM

## 2025-02-25 DIAGNOSIS — D45 POLYCYTHEMIA VERA: ICD-10-CM

## 2025-02-25 DIAGNOSIS — D45 POLYCYTHEMIA VERA: Primary | ICD-10-CM

## 2025-02-25 LAB
BASOPHILS # BLD AUTO: 0.01 X10*3/UL (ref 0–0.1)
BASOPHILS NFR BLD AUTO: 0.1 %
EOSINOPHIL # BLD AUTO: 0.14 X10*3/UL (ref 0–0.4)
EOSINOPHIL NFR BLD AUTO: 2.1 %
ERYTHROCYTE [DISTWIDTH] IN BLOOD BY AUTOMATED COUNT: 14.7 % (ref 11.5–14.5)
HCT VFR BLD AUTO: 45.6 % (ref 41–52)
HGB BLD-MCNC: 13.9 G/DL (ref 13.5–17.5)
IMM GRANULOCYTES # BLD AUTO: 0.02 X10*3/UL (ref 0–0.5)
IMM GRANULOCYTES NFR BLD AUTO: 0.3 % (ref 0–0.9)
LYMPHOCYTES # BLD AUTO: 2.2 X10*3/UL (ref 0.8–3)
LYMPHOCYTES NFR BLD AUTO: 32.5 %
MCH RBC QN AUTO: 23.4 PG (ref 26–34)
MCHC RBC AUTO-ENTMCNC: 30.5 G/DL (ref 32–36)
MCV RBC AUTO: 77 FL (ref 80–100)
MONOCYTES # BLD AUTO: 0.64 X10*3/UL (ref 0.05–0.8)
MONOCYTES NFR BLD AUTO: 9.5 %
NEUTROPHILS # BLD AUTO: 3.76 X10*3/UL (ref 1.6–5.5)
NEUTROPHILS NFR BLD AUTO: 55.5 %
NRBC BLD-RTO: 0 /100 WBCS (ref 0–0)
PLATELET # BLD AUTO: 224 X10*3/UL (ref 150–450)
RBC # BLD AUTO: 5.94 X10*6/UL (ref 4.5–5.9)
WBC # BLD AUTO: 6.8 X10*3/UL (ref 4.4–11.3)

## 2025-02-25 PROCEDURE — 85025 COMPLETE CBC W/AUTO DIFF WBC: CPT

## 2025-02-25 PROCEDURE — 99213 OFFICE O/P EST LOW 20 MIN: CPT | Performed by: INTERNAL MEDICINE

## 2025-02-25 PROCEDURE — 1123F ACP DISCUSS/DSCN MKR DOCD: CPT | Performed by: INTERNAL MEDICINE

## 2025-02-25 PROCEDURE — 1159F MED LIST DOCD IN RCRD: CPT | Performed by: INTERNAL MEDICINE

## 2025-02-25 PROCEDURE — 3074F SYST BP LT 130 MM HG: CPT | Performed by: INTERNAL MEDICINE

## 2025-02-25 PROCEDURE — 99195 PHLEBOTOMY: CPT

## 2025-02-25 PROCEDURE — 1126F AMNT PAIN NOTED NONE PRSNT: CPT | Performed by: INTERNAL MEDICINE

## 2025-02-25 PROCEDURE — 1160F RVW MEDS BY RX/DR IN RCRD: CPT | Performed by: INTERNAL MEDICINE

## 2025-02-25 PROCEDURE — 1036F TOBACCO NON-USER: CPT | Performed by: INTERNAL MEDICINE

## 2025-02-25 PROCEDURE — 3078F DIAST BP <80 MM HG: CPT | Performed by: INTERNAL MEDICINE

## 2025-02-25 RX ORDER — EPINEPHRINE 0.3 MG/.3ML
0.3 INJECTION SUBCUTANEOUS EVERY 5 MIN PRN
OUTPATIENT
Start: 2025-03-18

## 2025-02-25 RX ORDER — FAMOTIDINE 10 MG/ML
20 INJECTION, SOLUTION INTRAVENOUS ONCE AS NEEDED
OUTPATIENT
Start: 2025-03-18

## 2025-02-25 RX ORDER — DIPHENHYDRAMINE HYDROCHLORIDE 50 MG/ML
50 INJECTION INTRAMUSCULAR; INTRAVENOUS AS NEEDED
OUTPATIENT
Start: 2025-03-18

## 2025-02-25 RX ORDER — EPINEPHRINE 0.3 MG/.3ML
0.3 INJECTION SUBCUTANEOUS EVERY 5 MIN PRN
Status: DISCONTINUED | OUTPATIENT
Start: 2025-02-25 | End: 2025-02-25 | Stop reason: HOSPADM

## 2025-02-25 RX ORDER — FAMOTIDINE 10 MG/ML
20 INJECTION, SOLUTION INTRAVENOUS ONCE AS NEEDED
Status: DISCONTINUED | OUTPATIENT
Start: 2025-02-25 | End: 2025-02-25 | Stop reason: HOSPADM

## 2025-02-25 RX ORDER — ALBUTEROL SULFATE 0.83 MG/ML
3 SOLUTION RESPIRATORY (INHALATION) AS NEEDED
Status: DISCONTINUED | OUTPATIENT
Start: 2025-02-25 | End: 2025-02-25 | Stop reason: HOSPADM

## 2025-02-25 RX ORDER — DIPHENHYDRAMINE HYDROCHLORIDE 50 MG/ML
50 INJECTION INTRAMUSCULAR; INTRAVENOUS AS NEEDED
Status: DISCONTINUED | OUTPATIENT
Start: 2025-02-25 | End: 2025-02-25 | Stop reason: HOSPADM

## 2025-02-25 RX ORDER — ALBUTEROL SULFATE 0.83 MG/ML
3 SOLUTION RESPIRATORY (INHALATION) AS NEEDED
OUTPATIENT
Start: 2025-03-18

## 2025-02-25 ASSESSMENT — PAIN SCALES - GENERAL: PAINLEVEL_OUTOF10: 0-NO PAIN

## 2025-02-25 NOTE — PROGRESS NOTES
Domo Kelley  Male, 79 y.o., 1945  MRN: [id number]        Patient Visit Information:   Visit Type: Follow Up Visit   Polycythemia vera    Phlebotomy every 2 months, goal is to keep hematocrit less than 45       Chief Complaint: Polycythemia vera   Interval History:    2/25/25  Domo Kelley presents today for interval follow-up and treatment of polycythemia.     He reports he overall feels well. He has been receiving therapeutic phlebotomies every 2 months and tolerating well with good response.    Sometime patient has a headache, very  active, still working full-time, no night sweats, no fever, no chest pain, no shortness of breath, no abdominal pain, no diarrhea, no arthritis, no itching after hot showers.  Today hematocrit 45.6.     Past Medical History:  Polycythemia: Initially diagnosed May 2015 and started treatment with therapeutic phlebotomies at [facility] and transferred care closer  at [facility] in July 2021. Has  been receiving therapeutic phlebotomies to maintain goal hematocrit <45%.      Hypertension, Vitamin D deficiency, Diabetes Mellitus, Non-ischemic cardiomyopathy, JULIETA, AV block, CAD, History of squamous cell carcinoma of skin, Osteoarthritis.      Social History:  He grows wine grapes and turf seed.        Review of Systems:   Review of Systems:    Constitutional: Feeling well, no fatigue or weakness. No fever, chills, night sweats.  Head and neck: No dizziness. Intermittent headaches.    HEENT: No sore throat or sinusitis.  Hearing is normal eyesight is good.  Cardiac: No chest pain or palpitations.  Pulmonary: no cough or shortness of breath.  GI: Appetite is good and weight stable.  No constipation or diarrhea.  No abdominal pain  Genitourinary: No frequency or urgency.  No polyuria or dysuria.  Musculocutaneous: History of osteoarthritis.  Endocrine: No thyroid disease. History of diabetes.   Skin: No rash or itching.  Neuromuscular : no fainting or dizziness.  No  history of convulsions.  No tingling or numbness.           Allergies and Intolerances:       Allergies:         No Known Allergies: Drug, Unknown, Active     Outpatient Medication Profile:  * Patient Currently Takes Medications as of 25-Jul-2023 09:56 documented in Structured Notes         metFORMIN HCl - 1000 MG Oral Tablet : Last Dose Taken:  , TAKE 1 TABLET TWICE DAILY., Start Date: 02-Dec-2020         Chlorthalidone 25 MG Oral Tablet: Last Dose Taken:  , TAKE 1 TABLET DAILY.,  Start Date: 01-Aug-2019         losartan 25 mg oral tablet: Last Dose Taken:  , 1 tab(s) orally once  a day         Vitamin B Complex 100 injectable solution: Last Dose Taken:  , 1 cap(s)  orally once a day             Medical History:         Polycythemia vera: ICD-10: D45, Status: Active         Polycythemia vera: ICD-10: D45, Status: Active     Family History: No Family History items are recorded  in the problem list.      Social History:   Social Substance History:  ·  Smoking Status never smoker (1)   ·  Alcohol Use denies   ·  Drug Use denies            Vitals and Measurements:   Vitals: Temp: 35.6  HR: 50  RR: 16  BP: 156/75  SPO2%:   97   Measurements: HT(cm): 183  WT(kg): 93.7  BSA: 2.18   BMI:  27.9   Last 3 Weights & Heights: Date:                           Weight/Scale Type:                    Height:   25-Jul-2023 09:51                93.7  kg                     183  cm  30-May-2023 09:05                94.7  kg                     183  cm  28-Mar-2023 10:50                96.1  kg                     183  cm      Physical Exam:      Constitutional: awake/alert/oriented x3, no distress,   Eyes: PERRL, EOMI, clear sclera   ENMT: mucous membranes moist,   Head/Neck: Neck supple,  thyroid without mass or  tenderness, No JVD, trachea midline, no bruits   Respiratory/Thorax: Patent airways, CTAB, normal  breath sounds   Cardiovascular: Regular, rate and rhythm,   normal  S 1and S 2   Gastrointestinal: Nondistended, soft,  non-tender,   no masses palpable, no organomegaly, +BS,   Musculoskeletal: ROM intact, no joint swelling, normal  strength   Extremities: normal extremities, no cyanosis edema,   no clubbing   Neurological: alert and oriented x3,   Lymphatic: No significant lymphadenopathy   Psychological: Appropriate mood and behavior   Skin: Warm and dry, no lesions,         Lab Results:          :57 1 mo ago 3 mo ago 4 mo ago 5 mo ago 6 mo ago 8 mo ago    WBC  4.4 - 11.3 x10*3/uL 6.8 6.6 7.2 6.7 6.1 7.5 7.8   nRBC  0.0 - 0.0 /100 WBCs 0.0         RBC  4.50 - 5.90 x10*6/uL 5.94 High  6.14 High  6.00 High  5.82 5.32 5.41 5.58   Hemoglobin  13.5 - 17.5 g/dL 13.9 14.7 14.8 14.9 13.8 14.3 15.4   Hematocrit  41.0 - 52.0 % 45.6 47.0 46.2 45.9 42.7 44.3 45.3   MCV  80 - 100 fL 77 Low  77 Low  77 Low  79 Low  80 82 81   MCH  26.0 - 34.0 pg 23.4 Low  23.9 Low  24.7 Low  25.6 Low  25.9 Low  26.4 27.6   MCHC  32.0 - 36.0 g/dL 30.5 Low  31.3 Low  32.0 32.5 32.3 32.3 34.0   RDW  11.5 - 14.5 % 14.7 High  14.8 High  14.3 13.2 13.1 13.0 13.0   Platelets  150 - 450 x10*3/uL 224               ·  Results             Assessment and Plan:      Assessment and Plan:   Assessment:    1. Polycythemia  Initially diagnosed 2015 and received treatment through [facility] with therapeutic phlebotomies. Transferred care closer to home at [facility] [place]. Current treatment- therapeutic  phlebotomies every 2 months to maintain goal hematocrit <45%. Hematocrit today- 47.8%     2. Multiple medical conditions including HTN, JULIETA, DM, non-ischemic cardiomyopathy, etc.     Plan:  No phlebotomy today, HCT 45.6, continue phlebotomy every 2 months.   follow-up visit after 4 monthsMonitor CBC, CMP, iron studies.        Total time =20 minutes. 50% or more of this time was spent in counseling and/or coordination of care including reviewing medical history/radiology/labs, examining patient, formulating outlined plan  with team, and discussing plan with patient/family.   I reviewed patient's chart including but not limited to labs, imaging, surgical/procedure notes, pathology, hospital notes, doctor's notes.

## 2025-02-25 NOTE — PROGRESS NOTES
Pt here for FUV and phleb. Pt 2 weeks early, pt requesting to have phleb, dr hein approved. Pt met phleb parameters hematocrit 45.6. Pt tolerated phleb well. 500cc removed. Denied any symptoms prior to leaving office. He is aware of plan of care, will now return every 6 weeks for phleb

## 2025-02-25 NOTE — PATIENT INSTRUCTIONS
Follow up visit for history of polycythemia vera.     Therapeutic phlebotomy today. Continue phlebotomy every 8 weeks.     Follow up with Dr. Sequeira in 4 months.

## 2025-02-26 LAB
ANION GAP SERPL CALCULATED.4IONS-SCNC: 12 MMOL/L (CALC) (ref 7–17)
BUN SERPL-MCNC: 18 MG/DL (ref 7–25)
BUN/CREAT SERPL: ABNORMAL (CALC) (ref 6–22)
CALCIUM SERPL-MCNC: 9.9 MG/DL (ref 8.6–10.3)
CHLORIDE SERPL-SCNC: 95 MMOL/L (ref 98–110)
CO2 SERPL-SCNC: 31 MMOL/L (ref 20–32)
CREAT SERPL-MCNC: 0.92 MG/DL (ref 0.7–1.28)
EGFRCR SERPLBLD CKD-EPI 2021: 85 ML/MIN/1.73M2
GLUCOSE SERPL-MCNC: 267 MG/DL (ref 65–99)
MAGNESIUM SERPL-MCNC: 1.5 MG/DL (ref 1.5–2.5)
POTASSIUM SERPL-SCNC: 3.9 MMOL/L (ref 3.5–5.3)
SODIUM SERPL-SCNC: 138 MMOL/L (ref 135–146)

## 2025-02-27 ENCOUNTER — HOSPITAL ENCOUNTER (OUTPATIENT)
Dept: RADIOLOGY | Facility: HOSPITAL | Age: 80
Discharge: HOME | End: 2025-02-27
Payer: MEDICARE

## 2025-02-27 VITALS
RESPIRATION RATE: 17 BRPM | OXYGEN SATURATION: 99 % | SYSTOLIC BLOOD PRESSURE: 130 MMHG | DIASTOLIC BLOOD PRESSURE: 68 MMHG | HEART RATE: 59 BPM

## 2025-02-27 DIAGNOSIS — I25.10 CORONARY ARTERY DISEASE INVOLVING NATIVE CORONARY ARTERY OF NATIVE HEART, UNSPECIFIED WHETHER ANGINA PRESENT: ICD-10-CM

## 2025-02-27 PROCEDURE — 2550000001 HC RX 255 CONTRASTS: Performed by: INTERNAL MEDICINE

## 2025-02-27 PROCEDURE — 2500000001 HC RX 250 WO HCPCS SELF ADMINISTERED DRUGS (ALT 637 FOR MEDICARE OP): Performed by: STUDENT IN AN ORGANIZED HEALTH CARE EDUCATION/TRAINING PROGRAM

## 2025-02-27 PROCEDURE — 75574 CT ANGIO HRT W/3D IMAGE: CPT

## 2025-02-27 RX ORDER — METOPROLOL TARTRATE 1 MG/ML
5 INJECTION, SOLUTION INTRAVENOUS ONCE AS NEEDED
Status: DISCONTINUED | OUTPATIENT
Start: 2025-02-27 | End: 2025-02-28 | Stop reason: HOSPADM

## 2025-02-27 RX ORDER — NITROGLYCERIN 0.4 MG/1
0.8 TABLET SUBLINGUAL ONCE
Status: COMPLETED | OUTPATIENT
Start: 2025-02-27 | End: 2025-02-27

## 2025-02-27 RX ORDER — LORAZEPAM 2 MG/ML
0.5 INJECTION INTRAMUSCULAR EVERY 5 MIN PRN
Status: DISCONTINUED | OUTPATIENT
Start: 2025-02-27 | End: 2025-02-28 | Stop reason: HOSPADM

## 2025-02-27 RX ORDER — METOPROLOL TARTRATE 100 MG/1
100 TABLET ORAL ONCE AS NEEDED
Status: DISCONTINUED | OUTPATIENT
Start: 2025-02-27 | End: 2025-02-28 | Stop reason: HOSPADM

## 2025-02-27 RX ORDER — METOPROLOL TARTRATE 100 MG/1
100 TABLET ORAL ONCE
Status: DISCONTINUED | OUTPATIENT
Start: 2025-02-27 | End: 2025-02-28 | Stop reason: HOSPADM

## 2025-02-27 RX ORDER — METOPROLOL TARTRATE 1 MG/ML
5 INJECTION, SOLUTION INTRAVENOUS ONCE
Status: DISCONTINUED | OUTPATIENT
Start: 2025-02-27 | End: 2025-02-28 | Stop reason: HOSPADM

## 2025-02-27 RX ADMIN — NITROGLYCERIN 0.8 MG: 0.4 TABLET SUBLINGUAL at 08:20

## 2025-02-27 RX ADMIN — IOHEXOL 90 ML: 350 INJECTION, SOLUTION INTRAVENOUS at 08:36

## 2025-03-02 NOTE — PROGRESS NOTES
Counseling:  The patient was counseled regarding diagnostic results, instructions for management, risk factor reductions, prognosis, patient and family education, impressions, risks and benefits of treatment options and importance of compliance with treatment.      Chief Complaint:   The patient presents today for 6-month followup of CMP, heart failure, trifascicular block and aortic root dilatation s/p echocardiogram and CTA coronary arteries.      History Of Present Illness:    Domo Kelley is a 79 year old male patient who presents today for 6-month followup of CMP, heart failure, trifascicular block and aortic root dilatation s/p echocardiogram and CTA coronary arteries. 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 Echocardiogram performed 02/06/2025 demonstrated an EF of 54%, Grade I impaired relaxation pattern of left ventricular diastolic filling with normal left atrial filling pressure, normal RV systolic function and mild dilatation of the aortic root. 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 Over the past 6 months, the patient states that he has done well from a cardiac standpoint. He denies any CP, chest discomfort or SOB. He reports easy fatigability. BP has been stable. The patient is compliant with  "his prescribed medications.      Last Recorded Vitals:  Vitals:    03/03/25 1546   BP: 120/74   BP Location: Left arm   Pulse: 76   SpO2: 98%   Weight: 95.7 kg (211 lb)   Height: 1.835 m (6' 0.25\")       Past Surgical History:  He has a past surgical history that includes Hand surgery (10/10/2017); Other surgical history (10/10/2017); Tonsillectomy (10/10/2017); Other surgical history (07/17/2019); and Cardiac electrophysiology procedure (N/A, 5/15/2024).      Social History:  He reports that he has quit smoking. His smoking use included cigarettes. He has never used smokeless tobacco. He reports current alcohol use. He reports that he does not use drugs.    Family History:  Family History   Problem Relation Name Age of Onset    Breast cancer Mother      Skin cancer Father          Allergies:  Tetanus vaccines and toxoid    Outpatient Medications:  Current Outpatient Medications   Medication Instructions    atorvastatin (LIPITOR) 10 mg, oral, Daily    berberine/herbal complex no.18 (BERBERINE-HERBAL COMB NO.18 ORAL) 1,000 mg, Daily    blood-glucose sensor (FreeStyle Carmela 3 Sensor) device Use 1 sensor q 14 days    chlorthalidone (HYGROTON) 25 mg, oral, Daily    cyanocobalamin (Vitamin B-12) 1,000 mcg tablet 1 tablet, Daily    losartan (COZAAR) 25 mg, oral, Daily    metFORMIN  mg 24 hr tablet TAKE 2 TABLETS BY MOUTH TWICE A DAY    metoprolol succinate XL (TOPROL-XL) 25 mg, oral, Daily, Do not crush or chew.    Trulicity 0.75 mg, subcutaneous, Once Weekly    ZINC ORAL Take by mouth.     Review of Systems   Constitutional: Positive for malaise/fatigue.   All other systems reviewed and are negative.     Physical Exam:  Constitutional:       Appearance: Healthy appearance. Not in distress.   Neck:      Vascular: No JVR. JVD normal.   Pulmonary:      Effort: Pulmonary effort is normal.      Breath sounds: Normal breath sounds. No wheezing. No rhonchi. No rales.   Chest:      Chest wall: Not tender to palpatation. "   Cardiovascular:      PMI at left midclavicular line. Normal rate. Regular rhythm. Normal S1. Normal S2.       Murmurs: There is no murmur.      No gallop.  No click. No rub.   Pulses:     Intact distal pulses.   Edema:     Peripheral edema absent.   Abdominal:      General: Bowel sounds are normal.      Palpations: Abdomen is soft.      Tenderness: There is no abdominal tenderness.   Musculoskeletal: Normal range of motion.         General: No tenderness. Skin:     General: Skin is warm and dry.   Neurological:      General: No focal deficit present.      Mental Status: Alert and oriented to person, place and time.          Last Labs:  CBC -  Lab Results   Component Value Date    WBC 6.8 02/25/2025    HGB 13.9 02/25/2025    HCT 45.6 02/25/2025    MCV 77 (L) 02/25/2025     02/25/2025       CMP -  Lab Results   Component Value Date    CALCIUM 9.9 02/25/2025    PROT 6.9 09/10/2024    ALBUMIN 4.4 09/10/2024    AST 17 09/10/2024    ALT 19 09/10/2024    ALKPHOS 66 09/10/2024    BILITOT 0.7 09/10/2024       LIPID PANEL -   Lab Results   Component Value Date    CHOL 163 07/25/2023    TRIG 113 07/25/2023    HDL 43.6 07/25/2023    CHHDL 3.7 07/25/2023    LDLF 97 07/25/2023    VLDL 23 07/25/2023       RENAL FUNCTION PANEL -   Lab Results   Component Value Date    GLUCOSE 267 (H) 02/25/2025     02/25/2025    K 3.9 02/25/2025    CL 95 (L) 02/25/2025    CO2 31 02/25/2025    ANIONGAP 12 02/25/2025    BUN 18 02/25/2025    CREATININE 0.92 02/25/2025    GFRMALE 85 07/25/2023    CALCIUM 9.9 02/25/2025    ALBUMIN 4.4 09/10/2024        Lab Results   Component Value Date    HGBA1C 6.8 (A) 09/05/2024       Last Cardiology Tests:  02/27/2025 - CTA Coronary Arteries  1. Right dominant system.  2. Large dominant tortuous right coronary artery which gives off large PDA and PLV branches which supplies the inferior and lateral walls including the left AV groove territory.  3. The left circumflex is absent.  4. High-grade  eccentric soft plaque in the distal RCA with estimated luminal stenosis of at least 50%. Unfortunately heart flow was not available for current study.  5. Eccentric calcified atherosclerosis in the proximal LAD with estimated luminal stenosis of less than 30%.  6. 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  7. Minimal atherosclerosis involving the remaining coronary arterial territories.    02/06/2025 - TTE  1. The left ventricular systolic function is normal, with a Sepulveda's biplane calculated ejection fraction of 54%.  2. No regional wall motion abnormalities.  3. Spectral Doppler shows a Grade I (impaired relaxation pattern) of left ventricular diastolic filling with normal left atrial filling pressure.  4. There is normal right ventricular global systolic function.  5. Aortic valve stenosis is not present.  6. Left Ventricular Global Longitudinal Strain - 15.7 %.  7. Mild dilatation of the aortic root.     05/14/2024 - Electrophysiology Procedure  Successful implantation of a [employer] left sided Dual chamber pacemaker.    03/05/2024 to 03/19/2024 - Holter Monitor  1. Predominant underlying rhythm was sinus rhythm; min HR 32 bpm, max  bpm, avg HR 69 bpm.  2. First degree AV block was present. Bundle branch block/IVCD was present.  3. 1 run of ventricular tachycardia occurred; 4 beats, max rate 174 bpm (avg 164 bpm)  4. 1 run of supraventricular tachycardia occurred; 4 beats, max rate 94 bpm (avg 92 bpm).  5. 8 pauses occurred; longest lasting 3.6 secs (17 bpm). Pause was detected within +/- 45 seconds of symptomatic patient event(s).  6. Idioventricular rhythm was present.  7. Isolated SVEs were rare, SVE couplets were rare, and SVE triplets were rare.  8. Isolated VEs were rare, VE couplets were rare, and VE triplets were rare.  9. Ventricular bigeminy and trigeminy were present.      02/05/2024 - TTE  1. Left ventricular systolic function is normal.  2. Mild dilatation of the  ascending aorta (3.68 cm)  3. Mild dilatation of the aortic root     06/14/2023 - TTE  1. Left ventricular systolic function is normal with a 60-65% estimated ejection fraction.  2. There is moderate dilatation of the aortic root.     06/22/2021 - TTE  1. The left ventricular systolic function is mildly decreased with a 50% estimated ejection fraction.  2. There is global hypokinesis of the left ventricle with minor regional variations.     12/30/2019 - TTE  1. The left ventricular systolic function is mildly decreased with a 50% estimated ejection fraction.  2. There is moderate concentric left ventricular hypertrophy.     09/20/2019 - Cardiac Catheterization (LH)  1. Mild non-obstructive coronary artery disease.  2. 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      09/10/2019 - NM Cardiac Stress Test  1. Normal myocardial perfusion scan with no ischemia seen. There is an abnormal gated study showing a dilated left ventricle and mild generalized LV dysfunction. This is consistent with cardiomyopathy.   2. EKG portion nondiagnostic secondary to abnormal resting EKG without additional changes.      09/06/2019 - Echocardiogram   1. Mild left ventricular systolic dysfunction with regional abnormalities with an ejection fraction of 45%.  2. Akinetic mid and distal anterior wall segments.  3. Impaired relaxation filling pattern (Stage I diastolic dysfunction).     Lab review: I have personally reviewed the laboratory result(s).  Diagnostic review: I have personally reviewed the result(s) of the Echocardiogram.    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  On atorvastatin 10 mg daily, chlorthalidone 25 mg daily, losartan 25 mg daily, metoprolol succinate 25 mg daily.  Lisinopril stopped previously d/t having sensation that his throat was closing  BP stable  Advised on  Mediterranean style diet  TTE 02/06/2025 with LVEF 54%, Grade I impaired relaxation pattern of LV diastolic filling with normal left atrial filling pressure, normal RV systolic function, mild dilatation of the aortic root.  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  Denies CP, chest discomfort or SOB  Reports easy fatigability   Continue current medical Rx   Plan for CC/PTCA  Risks, benefits, alternatives of cardiac catheterization possible angioplasty and stenting including risk of death, stroke, MI, bleeding complications and renal failure were explained to patient and patient agreed to proceed.      2) Bradycardia - Trifascicular Block with Severe Conduction System Disease s/p Dual Chamber Pacemaker 05/15/2024.  On metoprolol succinate 25 mg daily  Management per EP  No further treatment unless he needs beta blockers  Holter 03/05/2024 to 03/19/2024 with min HR 32, first degree AV block, bundle branch block/IVCD, 1 run of v-tach, 1 run of SVT, 8 pauses  Now s/p implantation of Abbott/[employer] left sided dual chamber pacemaker 05/15/2024    4) Aortic Root Dilatation   TTE 02/06/2025 with mild dilatation of the aortic root - stable   BP stable       Scribe Attestation  By signing my name below, I, Jemma Desai   attest that this documentation has been prepared under the direction and in the presence of Carlos Rios MD.

## 2025-03-02 NOTE — H&P (VIEW-ONLY)
Counseling:  The patient was counseled regarding diagnostic results, instructions for management, risk factor reductions, prognosis, patient and family education, impressions, risks and benefits of treatment options and importance of compliance with treatment.      Chief Complaint:   The patient presents today for 6-month followup of CMP, heart failure, trifascicular block and aortic root dilatation s/p echocardiogram and CTA coronary arteries.      History Of Present Illness:    Domo Kelley is a 79 year old male patient who presents today for 6-month followup of CMP, heart failure, trifascicular block and aortic root dilatation s/p echocardiogram and CTA coronary arteries. 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 Echocardiogram performed 02/06/2025 demonstrated an EF of 54%, Grade I impaired relaxation pattern of left ventricular diastolic filling with normal left atrial filling pressure, normal RV systolic function and mild dilatation of the aortic root. 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 Over the past 6 months, the patient states that he has done well from a cardiac standpoint. He denies any CP, chest discomfort or SOB. He reports easy fatigability. BP has been stable. The patient is compliant with  "his prescribed medications.      Last Recorded Vitals:  Vitals:    03/03/25 1546   BP: 120/74   BP Location: Left arm   Pulse: 76   SpO2: 98%   Weight: 95.7 kg (211 lb)   Height: 1.835 m (6' 0.25\")       Past Surgical History:  He has a past surgical history that includes Hand surgery (10/10/2017); Other surgical history (10/10/2017); Tonsillectomy (10/10/2017); Other surgical history (07/17/2019); and Cardiac electrophysiology procedure (N/A, 5/15/2024).      Social History:  He reports that he has quit smoking. His smoking use included cigarettes. He has never used smokeless tobacco. He reports current alcohol use. He reports that he does not use drugs.    Family History:  Family History   Problem Relation Name Age of Onset    Breast cancer Mother      Skin cancer Father          Allergies:  Tetanus vaccines and toxoid    Outpatient Medications:  Current Outpatient Medications   Medication Instructions    atorvastatin (LIPITOR) 10 mg, oral, Daily    berberine/herbal complex no.18 (BERBERINE-HERBAL COMB NO.18 ORAL) 1,000 mg, Daily    blood-glucose sensor (FreeStyle Carmela 3 Sensor) device Use 1 sensor q 14 days    chlorthalidone (HYGROTON) 25 mg, oral, Daily    cyanocobalamin (Vitamin B-12) 1,000 mcg tablet 1 tablet, Daily    losartan (COZAAR) 25 mg, oral, Daily    metFORMIN  mg 24 hr tablet TAKE 2 TABLETS BY MOUTH TWICE A DAY    metoprolol succinate XL (TOPROL-XL) 25 mg, oral, Daily, Do not crush or chew.    Trulicity 0.75 mg, subcutaneous, Once Weekly    ZINC ORAL Take by mouth.     Review of Systems   Constitutional: Positive for malaise/fatigue.   All other systems reviewed and are negative.     Physical Exam:  Constitutional:       Appearance: Healthy appearance. Not in distress.   Neck:      Vascular: No JVR. JVD normal.   Pulmonary:      Effort: Pulmonary effort is normal.      Breath sounds: Normal breath sounds. No wheezing. No rhonchi. No rales.   Chest:      Chest wall: Not tender to palpatation. "   Cardiovascular:      PMI at left midclavicular line. Normal rate. Regular rhythm. Normal S1. Normal S2.       Murmurs: There is no murmur.      No gallop.  No click. No rub.   Pulses:     Intact distal pulses.   Edema:     Peripheral edema absent.   Abdominal:      General: Bowel sounds are normal.      Palpations: Abdomen is soft.      Tenderness: There is no abdominal tenderness.   Musculoskeletal: Normal range of motion.         General: No tenderness. Skin:     General: Skin is warm and dry.   Neurological:      General: No focal deficit present.      Mental Status: Alert and oriented to person, place and time.          Last Labs:  CBC -  Lab Results   Component Value Date    WBC 6.8 02/25/2025    HGB 13.9 02/25/2025    HCT 45.6 02/25/2025    MCV 77 (L) 02/25/2025     02/25/2025       CMP -  Lab Results   Component Value Date    CALCIUM 9.9 02/25/2025    PROT 6.9 09/10/2024    ALBUMIN 4.4 09/10/2024    AST 17 09/10/2024    ALT 19 09/10/2024    ALKPHOS 66 09/10/2024    BILITOT 0.7 09/10/2024       LIPID PANEL -   Lab Results   Component Value Date    CHOL 163 07/25/2023    TRIG 113 07/25/2023    HDL 43.6 07/25/2023    CHHDL 3.7 07/25/2023    LDLF 97 07/25/2023    VLDL 23 07/25/2023       RENAL FUNCTION PANEL -   Lab Results   Component Value Date    GLUCOSE 267 (H) 02/25/2025     02/25/2025    K 3.9 02/25/2025    CL 95 (L) 02/25/2025    CO2 31 02/25/2025    ANIONGAP 12 02/25/2025    BUN 18 02/25/2025    CREATININE 0.92 02/25/2025    GFRMALE 85 07/25/2023    CALCIUM 9.9 02/25/2025    ALBUMIN 4.4 09/10/2024        Lab Results   Component Value Date    HGBA1C 6.8 (A) 09/05/2024       Last Cardiology Tests:  02/27/2025 - CTA Coronary Arteries  1. Right dominant system.  2. Large dominant tortuous right coronary artery which gives off large PDA and PLV branches which supplies the inferior and lateral walls including the left AV groove territory.  3. The left circumflex is absent.  4. High-grade  eccentric soft plaque in the distal RCA with estimated luminal stenosis of at least 50%. Unfortunately heart flow was not available for current study.  5. Eccentric calcified atherosclerosis in the proximal LAD with estimated luminal stenosis of less than 30%.  6. 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  7. Minimal atherosclerosis involving the remaining coronary arterial territories.    02/06/2025 - TTE  1. The left ventricular systolic function is normal, with a Sepulveda's biplane calculated ejection fraction of 54%.  2. No regional wall motion abnormalities.  3. Spectral Doppler shows a Grade I (impaired relaxation pattern) of left ventricular diastolic filling with normal left atrial filling pressure.  4. There is normal right ventricular global systolic function.  5. Aortic valve stenosis is not present.  6. Left Ventricular Global Longitudinal Strain - 15.7 %.  7. Mild dilatation of the aortic root.     05/14/2024 - Electrophysiology Procedure  Successful implantation of a [employer] left sided Dual chamber pacemaker.    03/05/2024 to 03/19/2024 - Holter Monitor  1. Predominant underlying rhythm was sinus rhythm; min HR 32 bpm, max  bpm, avg HR 69 bpm.  2. First degree AV block was present. Bundle branch block/IVCD was present.  3. 1 run of ventricular tachycardia occurred; 4 beats, max rate 174 bpm (avg 164 bpm)  4. 1 run of supraventricular tachycardia occurred; 4 beats, max rate 94 bpm (avg 92 bpm).  5. 8 pauses occurred; longest lasting 3.6 secs (17 bpm). Pause was detected within +/- 45 seconds of symptomatic patient event(s).  6. Idioventricular rhythm was present.  7. Isolated SVEs were rare, SVE couplets were rare, and SVE triplets were rare.  8. Isolated VEs were rare, VE couplets were rare, and VE triplets were rare.  9. Ventricular bigeminy and trigeminy were present.      02/05/2024 - TTE  1. Left ventricular systolic function is normal.  2. Mild dilatation of the  ascending aorta (3.68 cm)  3. Mild dilatation of the aortic root     06/14/2023 - TTE  1. Left ventricular systolic function is normal with a 60-65% estimated ejection fraction.  2. There is moderate dilatation of the aortic root.     06/22/2021 - TTE  1. The left ventricular systolic function is mildly decreased with a 50% estimated ejection fraction.  2. There is global hypokinesis of the left ventricle with minor regional variations.     12/30/2019 - TTE  1. The left ventricular systolic function is mildly decreased with a 50% estimated ejection fraction.  2. There is moderate concentric left ventricular hypertrophy.     09/20/2019 - Cardiac Catheterization (LH)  1. Mild non-obstructive coronary artery disease.  2. 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      09/10/2019 - NM Cardiac Stress Test  1. Normal myocardial perfusion scan with no ischemia seen. There is an abnormal gated study showing a dilated left ventricle and mild generalized LV dysfunction. This is consistent with cardiomyopathy.   2. EKG portion nondiagnostic secondary to abnormal resting EKG without additional changes.      09/06/2019 - Echocardiogram   1. Mild left ventricular systolic dysfunction with regional abnormalities with an ejection fraction of 45%.  2. Akinetic mid and distal anterior wall segments.  3. Impaired relaxation filling pattern (Stage I diastolic dysfunction).     Lab review: I have personally reviewed the laboratory result(s).  Diagnostic review: I have personally reviewed the result(s) of the Echocardiogram.    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  On atorvastatin 10 mg daily, chlorthalidone 25 mg daily, losartan 25 mg daily, metoprolol succinate 25 mg daily.  Lisinopril stopped previously d/t having sensation that his throat was closing  BP stable  Advised on  Mediterranean style diet  TTE 02/06/2025 with LVEF 54%, Grade I impaired relaxation pattern of LV diastolic filling with normal left atrial filling pressure, normal RV systolic function, mild dilatation of the aortic root.  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  Denies CP, chest discomfort or SOB  Reports easy fatigability   Continue current medical Rx   Plan for CC/PTCA  Risks, benefits, alternatives of cardiac catheterization possible angioplasty and stenting including risk of death, stroke, MI, bleeding complications and renal failure were explained to patient and patient agreed to proceed.      2) Bradycardia - Trifascicular Block with Severe Conduction System Disease s/p Dual Chamber Pacemaker 05/15/2024.  On metoprolol succinate 25 mg daily  Management per EP  No further treatment unless he needs beta blockers  Holter 03/05/2024 to 03/19/2024 with min HR 32, first degree AV block, bundle branch block/IVCD, 1 run of v-tach, 1 run of SVT, 8 pauses  Now s/p implantation of [employer] left sided dual chamber pacemaker 05/15/2024    4) Aortic Root Dilatation   TTE 02/06/2025 with mild dilatation of the aortic root - stable   BP stable       Scribe Attestation  By signing my name below, I, Jemma Desai   attest that this documentation has been prepared under the direction and in the presence of Carlos Rios MD.

## 2025-03-03 ENCOUNTER — OFFICE VISIT (OUTPATIENT)
Dept: CARDIOLOGY | Facility: HOSPITAL | Age: 80
End: 2025-03-03
Payer: MEDICARE

## 2025-03-03 ENCOUNTER — TELEPHONE (OUTPATIENT)
Dept: CARDIOLOGY | Facility: HOSPITAL | Age: 80
End: 2025-03-03

## 2025-03-03 VITALS
OXYGEN SATURATION: 98 % | HEART RATE: 76 BPM | BODY MASS INDEX: 28.58 KG/M2 | SYSTOLIC BLOOD PRESSURE: 120 MMHG | HEIGHT: 72 IN | DIASTOLIC BLOOD PRESSURE: 74 MMHG | WEIGHT: 211 LBS

## 2025-03-03 DIAGNOSIS — I77.810 AORTIC ROOT DILATATION (CMS-HCC): ICD-10-CM

## 2025-03-03 DIAGNOSIS — I50.32 HEART FAILURE WITH IMPROVED EJECTION FRACTION (HFIMPEF): ICD-10-CM

## 2025-03-03 DIAGNOSIS — R93.89 ABNORMAL COMPUTED TOMOGRAPHY ANGIOGRAPHY (CTA): Primary | ICD-10-CM

## 2025-03-03 DIAGNOSIS — Q24.5 CORONARY ARTERY ANOMALY (HHS-HCC): ICD-10-CM

## 2025-03-03 PROCEDURE — 99213 OFFICE O/P EST LOW 20 MIN: CPT | Performed by: INTERNAL MEDICINE

## 2025-03-03 PROCEDURE — 1159F MED LIST DOCD IN RCRD: CPT | Performed by: INTERNAL MEDICINE

## 2025-03-03 PROCEDURE — 3074F SYST BP LT 130 MM HG: CPT | Performed by: INTERNAL MEDICINE

## 2025-03-03 PROCEDURE — 3078F DIAST BP <80 MM HG: CPT | Performed by: INTERNAL MEDICINE

## 2025-03-03 PROCEDURE — 1123F ACP DISCUSS/DSCN MKR DOCD: CPT | Performed by: INTERNAL MEDICINE

## 2025-03-03 PROCEDURE — 1160F RVW MEDS BY RX/DR IN RCRD: CPT | Performed by: INTERNAL MEDICINE

## 2025-03-03 RX ORDER — SODIUM CHLORIDE 9 MG/ML
100 INJECTION, SOLUTION INTRAVENOUS CONTINUOUS
OUTPATIENT
Start: 2025-03-03 | End: 2025-03-05

## 2025-03-03 NOTE — PATIENT INSTRUCTIONS
Continue all current medications as prescribed.   Dr. Rios has placed orders for a heart catheterization to evaluate for any blockages within your heart arteries. If any blockages are found, these can be fixe at the same time with stenting. Instructions: Nothing to eat or drink from midnight the night before the procedure. If you take any morning medications, these can be taken with small sips of water; no coffee or tea. HOLD METFORMIN FOR 2 DAYS PRIOR TO THE PROCEDURE. Please have someone with you to drive you home as you will receive light sedation and driving is not recommended. Please see the folder provided to you today for further information.  Followup with Dr. Rios after the above procedure.    If you have any questions or cardiac concerns, please call our office at [phone].

## 2025-03-03 NOTE — TELEPHONE ENCOUNTER
BMP and magnesium labs done 2/25/2025 were reviewed by Corina. Labs were wal except magnesium was low. Per Corina, the patient should start mag ox 400mg daily and follow up as scheduled. Left vm requesting the patient call back to discuss.

## 2025-03-04 ENCOUNTER — TELEPHONE (OUTPATIENT)
Dept: CARDIOLOGY | Facility: HOSPITAL | Age: 80
End: 2025-03-04
Payer: MEDICARE

## 2025-03-04 ENCOUNTER — APPOINTMENT (OUTPATIENT)
Dept: HEMATOLOGY/ONCOLOGY | Facility: CLINIC | Age: 80
End: 2025-03-04
Payer: MEDICARE

## 2025-03-04 PROBLEM — R93.89 ABNORMAL COMPUTED TOMOGRAPHY ANGIOGRAPHY (CTA): Status: ACTIVE | Noted: 2025-03-03

## 2025-03-04 NOTE — TELEPHONE ENCOUNTER
I called pt and scheduled his LHC.     Pt is scheduled for 3/11/25    Arrive @930am  Start @1030am    I let pt know he needed lab work done and Cristina DALAL would reach out with instructions.    Thank you!  Gennaro SAMUEL   Patient's wife states the patient has a Medical Directive and will bring a copy to put in the patient's chart.   Health Maintenance Due   Topic Date Due    DTaP/Tdap/Td series (1 - Tdap) 03/30/1949    Pneumococcal 65+ Low/Medium Risk (2 of 2 - PPSV23) 08/15/2017    MEDICARE YEARLY EXAM  03/14/2018

## 2025-03-05 ENCOUNTER — APPOINTMENT (OUTPATIENT)
Dept: PRIMARY CARE | Facility: CLINIC | Age: 80
End: 2025-03-05
Payer: MEDICARE

## 2025-03-05 VITALS
HEIGHT: 72 IN | OXYGEN SATURATION: 98 % | BODY MASS INDEX: 28.31 KG/M2 | HEART RATE: 69 BPM | WEIGHT: 209 LBS | DIASTOLIC BLOOD PRESSURE: 64 MMHG | SYSTOLIC BLOOD PRESSURE: 118 MMHG | TEMPERATURE: 97.8 F

## 2025-03-05 DIAGNOSIS — E55.9 VITAMIN D DEFICIENCY: ICD-10-CM

## 2025-03-05 DIAGNOSIS — I50.22 CHRONIC SYSTOLIC HEART FAILURE: ICD-10-CM

## 2025-03-05 DIAGNOSIS — I45.10 RIGHT BUNDLE BRANCH BLOCK (RBBB) DETERMINED BY ELECTROCARDIOGRAPHY: ICD-10-CM

## 2025-03-05 DIAGNOSIS — D45 POLYCYTHEMIA VERA: ICD-10-CM

## 2025-03-05 DIAGNOSIS — I77.810 AORTIC ROOT DILATATION (CMS-HCC): ICD-10-CM

## 2025-03-05 DIAGNOSIS — I42.8 NONISCHEMIC CARDIOMYOPATHY (MULTI): ICD-10-CM

## 2025-03-05 DIAGNOSIS — I10 BENIGN ESSENTIAL HYPERTENSION: ICD-10-CM

## 2025-03-05 DIAGNOSIS — I45.2 BIFASCICULAR BLOCK: ICD-10-CM

## 2025-03-05 DIAGNOSIS — G47.33 OBSTRUCTIVE SLEEP APNEA SYNDROME: ICD-10-CM

## 2025-03-05 DIAGNOSIS — E11.9 CONTROLLED TYPE 2 DIABETES MELLITUS WITHOUT COMPLICATION, WITHOUT LONG-TERM CURRENT USE OF INSULIN (MULTI): ICD-10-CM

## 2025-03-05 DIAGNOSIS — Z00.00 MEDICARE ANNUAL WELLNESS VISIT, SUBSEQUENT: Primary | ICD-10-CM

## 2025-03-05 LAB — POC HEMOGLOBIN A1C: 7.9 % (ref 4.2–6.5)

## 2025-03-05 PROCEDURE — 1159F MED LIST DOCD IN RCRD: CPT | Performed by: FAMILY MEDICINE

## 2025-03-05 PROCEDURE — 1160F RVW MEDS BY RX/DR IN RCRD: CPT | Performed by: FAMILY MEDICINE

## 2025-03-05 PROCEDURE — G0439 PPPS, SUBSEQ VISIT: HCPCS | Performed by: FAMILY MEDICINE

## 2025-03-05 PROCEDURE — 3074F SYST BP LT 130 MM HG: CPT | Performed by: FAMILY MEDICINE

## 2025-03-05 PROCEDURE — 1123F ACP DISCUSS/DSCN MKR DOCD: CPT | Performed by: FAMILY MEDICINE

## 2025-03-05 PROCEDURE — 1170F FXNL STATUS ASSESSED: CPT | Performed by: FAMILY MEDICINE

## 2025-03-05 PROCEDURE — 3078F DIAST BP <80 MM HG: CPT | Performed by: FAMILY MEDICINE

## 2025-03-05 PROCEDURE — 99214 OFFICE O/P EST MOD 30 MIN: CPT | Performed by: FAMILY MEDICINE

## 2025-03-05 PROCEDURE — 83036 HEMOGLOBIN GLYCOSYLATED A1C: CPT | Performed by: FAMILY MEDICINE

## 2025-03-05 PROCEDURE — 1036F TOBACCO NON-USER: CPT | Performed by: FAMILY MEDICINE

## 2025-03-05 RX ORDER — DULAGLUTIDE 0.75 MG/.5ML
0.75 INJECTION, SOLUTION SUBCUTANEOUS
Qty: 2 ML | Refills: 11 | Status: CANCELLED | OUTPATIENT
Start: 2025-03-09

## 2025-03-05 RX ORDER — CHOLECALCIFEROL (VITAMIN D3) 25 MCG
1000 TABLET ORAL DAILY
COMMUNITY

## 2025-03-05 RX ORDER — ATORVASTATIN CALCIUM 10 MG/1
10 TABLET, FILM COATED ORAL DAILY
Qty: 100 TABLET | Refills: 3 | Status: SHIPPED | OUTPATIENT
Start: 2025-03-05 | End: 2026-04-09

## 2025-03-05 ASSESSMENT — PATIENT HEALTH QUESTIONNAIRE - PHQ9
2. FEELING DOWN, DEPRESSED OR HOPELESS: NOT AT ALL
1. LITTLE INTEREST OR PLEASURE IN DOING THINGS: NOT AT ALL
SUM OF ALL RESPONSES TO PHQ9 QUESTIONS 1 AND 2: 0

## 2025-03-05 NOTE — PROGRESS NOTES
"Subjective   Reason for Visit: Domo Kelley is an 79 y.o. male here for follow-up on chronic medical conditions and Medicare exam  Hemoglobin A1c     HPI  Reviewed Chronic illnesses  Concerns today:     In general the patient states that his health is:good    Regular dental visits: Regular.  No issues  Vision problems: no changes in the vision  Hearing loss: Hearing aides    Diet: Well balanced  Exercise: acitve  Weight concerns: no  Sleep: Patient does not need CPAP anymore.  Recent test showed no sleep apnea.  Gets good sleep.  Uses everynight.    Caffeine: regualr  Water: good intake    Urinary difficulties has urinary frequency and nocturia    Colon cancer screening:  Colonoscopy    Patient Care Team:  Chris Kelsey DO as PCP - General  Chris Kelsey DO as PCP - MSSP ACO Attributed Provider  Carlos Rios MD as Consulting Physician (Cardiology)  Dominga Sorenson MD (Dermatology)  Rasta Sequeira MD as Consulting Physician (Hematology and Oncology)     Review of Systems    Objective   Vitals:  /64 (BP Location: Left arm, Patient Position: Sitting, BP Cuff Size: Adult)   Pulse 69   Temp 36.6 °C (97.8 °F)   Ht 1.835 m (6' 0.25\")   Wt 94.8 kg (209 lb)   SpO2 98%   BMI 28.15 kg/m²       Physical Exam  General: Patient is alert and oriented ×3 and appears in no acute distress. No respiratory distress.    Head: Atraumatic normocephalic.    Eyes: EOMI, PERRLA      Ears: Canals patent without any irritation, tympanic membranes without inflammation, no swelling, normal light reflex.    Nose: Nares patent. Turbinates are swollen. No discharge.    Mouth: Normal mucosa. Moist. No erythema, exudates, tonsillar enlargement.    Neck: Normal range of motion, no masses.  Thyroid is palpable and normal in size without any nodules. No anterior cervical or posterior cervical adenopathy.    Heart: Regular rate and rhythm, no murmurs clicks or gallops    Lungs: Clear to auscultation bilaterally without any rhonchi rales " or wheezing, lung sounds heard throughout all lung fields    Abdomen: Soft, nontender, no rigidity, rebound, guarding or organomegaly. Bowel sounds ×4 quadrants.    Musculoskeletal: Normal range of motion, strength is grossly intact in the proximal distal muscles of the upper and lower extremities bilaterally, deep tendon reflexes +2 out of 4 and symmetric bilaterally at the patella, Achilles, biceps, triceps, sensation intact.    Nerves: Cranial nerves II through XII appear grossly intact and without deficit    Skin: Intact, dry, no rashes or erythema    Psych: Normal affect.  Assessment/Plan   Problem List Items Addressed This Visit       Controlled type 2 diabetes mellitus without complication, without long-term current use of insulin (Multi)    Nonischemic cardiomyopathy (Multi)     1. Polycythemia   Initially diagnosed 2015 and received treatment through [facility]   with therapeutic phlebotomies. Transferred [place] closer [place] at [facility] [place].   Current treatment- therapeutic phlebotomies every 2 months to maintain goal   hematocrit <45%. Hematocrit today- 47.8%      diabetes type 2  - Controlled with HBA1c 7.9 3/5/25  -metformin ER 1000 mg twice daily.   - Trulicity .75 mg weekly  -[place] [facility] [place] [facility] [place] and told to get his Eye exam  -Mediteranian diet suggested  - Microalbumin done 10/30/2023 normal  - exercise at least 15 to 20 minutes a day 4 days a week  - Atorvastatin 10 mg daily   - Freestyle Carmela  -Repeat labs in 3 months    Essential hypertension  -Controlled at 118/64    Obstructive sleep apnea  -Resolved    Vitamin D deficiency    Polycythemia vera  -Receiving phlebotomy every 2 months. Dr. Moctezuma hematologist. DR CHRISTOPHER following    Noniscemic Cardiomyopathy/aortic root dilation/chronic systolic heart failure  Pace maker  Aortic root dilation  Heart Cath 3/11/25  02/27/2025 - CTA Coronary Arteries  1. Right dominant system.  2. Large dominant  tortuous right coronary artery which gives off large PDA and PLV branches which supplies the inferior and lateral walls including the left AV groove territory.  3. The left circumflex is absent.  4. High-grade eccentric soft plaque in the distal RCA with estimated luminal stenosis of at least 50%. Unfortunately heart flow was not available for current study.  5. Eccentric calcified atherosclerosis in the proximal LAD with estimated luminal stenosis of less than 30%.  6. 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  7. Minimal atherosclerosis involving the remaining coronary arterial territories.     02/06/2025 - TTE  1. The left ventricular systolic function is normal, with a Sepulveda's biplane calculated ejection fraction of 54%.  2. No regional wall motion abnormalities.  3. Spectral Doppler shows a Grade I (impaired relaxation pattern) of left ventricular diastolic filling with normal left atrial filling pressure.  4. There is normal right ventricular global systolic function.  5. Aortic valve stenosis is not present.  6. Left Ventricular Global Longitudinal Strain - 15.7 %.  7. Mild dilatation of the aortic root.        Squamous cell carcinoma- Followed by Dr Sorenson Dermatology    Allergic rhinitis  - Instructed to use Flonase 1 spray per nostril after using salt water nasal rinses    Follow up in 6 months

## 2025-03-05 NOTE — TELEPHONE ENCOUNTER
Calling instructions for upcoming procedure: left heart catheterization    Pt is scheduled for 3/11/25     Arrive @930am  Start @1030am    02/25/2025: Creat- 0.92 and GFR-85    Please make sure Cath lab NP or preforming provider have been notified of any patient with a single kidney or transplanted kidney.  Anti rejection medications need to be carefully considered to give/hold.    Does patient require dye prep?  Do not need dye prep for shellfish allergy, only for contrast allergy. Patient does not require dye prep.    Check for medications that need to be held or adjusted:    Hold Chlorthalidone day of procedure.     Diabetic medications:          Metformin :Hold 2 days before, day of and 2 days after the cath for a total of 5 days.    Per Cath lab, no need to hold Trulicity on 03/09/2025    Continuous Glucose monitors- remove prior to coming into cath lab. If you are not due to change the CGM, patient can elect to keep it on but we are not responsible if sensor is damaged and we ask that you verify your CGM with a glucometer over the next 24 to 48 hours. If you are due to change the CGM, its best to remove and then reapply when you go home.      Further instructions:  Nothing to eat or drink after midnight except Losartan, Metoprolol with a sip of water the morning of the cath.      You will need a responsible adult .    Bring your photo ID, insurance cards an accurate list of the medications you are currently taking.      Wear loose, comfortable clothing.      There is a possibility of staying over night for observation so make arrangements and pack a bag with necessities for that just incase.      Patient correctly repeated instructions, verbalized understanding and is agreeable to the plan.

## 2025-03-06 ENCOUNTER — LAB (OUTPATIENT)
Dept: LAB | Facility: HOSPITAL | Age: 80
End: 2025-03-06
Payer: MEDICARE

## 2025-03-06 DIAGNOSIS — I50.32 CHRONIC DIASTOLIC (CONGESTIVE) HEART FAILURE: ICD-10-CM

## 2025-03-06 DIAGNOSIS — I77.810 THORACIC AORTIC ECTASIA (CMS-HCC): ICD-10-CM

## 2025-03-06 DIAGNOSIS — Q24.5 MALFORMATION OF CORONARY VESSELS: ICD-10-CM

## 2025-03-06 DIAGNOSIS — R93.89 ABNORMAL FINDINGS ON DIAGNOSTIC IMAGING OF OTHER SPECIFIED BODY STRUCTURES: Primary | ICD-10-CM

## 2025-03-06 LAB
ANION GAP SERPL CALC-SCNC: 14 MMOL/L (ref 10–20)
BUN SERPL-MCNC: 23 MG/DL (ref 6–23)
CALCIUM SERPL-MCNC: 9.9 MG/DL (ref 8.6–10.3)
CHLORIDE SERPL-SCNC: 95 MMOL/L (ref 98–107)
CO2 SERPL-SCNC: 32 MMOL/L (ref 21–32)
CREAT SERPL-MCNC: 0.99 MG/DL (ref 0.5–1.3)
EGFRCR SERPLBLD CKD-EPI 2021: 78 ML/MIN/1.73M*2
ERYTHROCYTE [DISTWIDTH] IN BLOOD BY AUTOMATED COUNT: 15.2 % (ref 11.5–14.5)
GLUCOSE SERPL-MCNC: 171 MG/DL (ref 74–99)
HCT VFR BLD AUTO: 43.8 % (ref 41–52)
HGB BLD-MCNC: 13.6 G/DL (ref 13.5–17.5)
MCH RBC QN AUTO: 23.5 PG (ref 26–34)
MCHC RBC AUTO-ENTMCNC: 31.1 G/DL (ref 32–36)
MCV RBC AUTO: 76 FL (ref 80–100)
NRBC BLD-RTO: 0 /100 WBCS (ref 0–0)
PLATELET # BLD AUTO: 248 X10*3/UL (ref 150–450)
POTASSIUM SERPL-SCNC: 4.3 MMOL/L (ref 3.5–5.3)
RBC # BLD AUTO: 5.79 X10*6/UL (ref 4.5–5.9)
SODIUM SERPL-SCNC: 137 MMOL/L (ref 136–145)
WBC # BLD AUTO: 7.7 X10*3/UL (ref 4.4–11.3)

## 2025-03-06 PROCEDURE — 80048 BASIC METABOLIC PNL TOTAL CA: CPT

## 2025-03-06 PROCEDURE — 85027 COMPLETE CBC AUTOMATED: CPT

## 2025-03-07 ENCOUNTER — APPOINTMENT (OUTPATIENT)
Dept: CARDIOLOGY | Facility: HOSPITAL | Age: 80
End: 2025-03-07
Payer: MEDICARE

## 2025-03-11 ENCOUNTER — HOSPITAL ENCOUNTER (OUTPATIENT)
Facility: HOSPITAL | Age: 80
Setting detail: OUTPATIENT SURGERY
Discharge: HOME | End: 2025-03-11
Attending: INTERNAL MEDICINE | Admitting: INTERNAL MEDICINE
Payer: MEDICARE

## 2025-03-11 VITALS
SYSTOLIC BLOOD PRESSURE: 167 MMHG | RESPIRATION RATE: 20 BRPM | OXYGEN SATURATION: 99 % | DIASTOLIC BLOOD PRESSURE: 91 MMHG | HEART RATE: 60 BPM | TEMPERATURE: 97 F

## 2025-03-11 DIAGNOSIS — R93.89 ABNORMAL COMPUTED TOMOGRAPHY ANGIOGRAPHY (CTA): Primary | ICD-10-CM

## 2025-03-11 DIAGNOSIS — R93.1 ABNORMAL FINDINGS ON DIAGNOSTIC IMAGING OF HEART AND CORONARY CIRCULATION: ICD-10-CM

## 2025-03-11 DIAGNOSIS — Q24.5 CORONARY ARTERY ANOMALY (HHS-HCC): ICD-10-CM

## 2025-03-11 DIAGNOSIS — I50.32 HEART FAILURE WITH IMPROVED EJECTION FRACTION (HFIMPEF): ICD-10-CM

## 2025-03-11 PROCEDURE — 99152 MOD SED SAME PHYS/QHP 5/>YRS: CPT | Performed by: INTERNAL MEDICINE

## 2025-03-11 PROCEDURE — 2720000007 HC OR 272 NO HCPCS: Performed by: INTERNAL MEDICINE

## 2025-03-11 PROCEDURE — 2550000001 HC RX 255 CONTRASTS: Performed by: INTERNAL MEDICINE

## 2025-03-11 PROCEDURE — C1887 CATHETER, GUIDING: HCPCS | Performed by: INTERNAL MEDICINE

## 2025-03-11 PROCEDURE — C1760 CLOSURE DEV, VASC: HCPCS | Performed by: INTERNAL MEDICINE

## 2025-03-11 PROCEDURE — 96373 THER/PROPH/DIAG INJ IA: CPT | Mod: 59 | Performed by: INTERNAL MEDICINE

## 2025-03-11 PROCEDURE — 2500000004 HC RX 250 GENERAL PHARMACY W/ HCPCS (ALT 636 FOR OP/ED): Performed by: INTERNAL MEDICINE

## 2025-03-11 PROCEDURE — 99153 MOD SED SAME PHYS/QHP EA: CPT | Performed by: INTERNAL MEDICINE

## 2025-03-11 PROCEDURE — C1894 INTRO/SHEATH, NON-LASER: HCPCS | Performed by: INTERNAL MEDICINE

## 2025-03-11 PROCEDURE — 93458 L HRT ARTERY/VENTRICLE ANGIO: CPT | Performed by: INTERNAL MEDICINE

## 2025-03-11 PROCEDURE — 7100000009 HC PHASE TWO TIME - INITIAL BASE CHARGE: Performed by: INTERNAL MEDICINE

## 2025-03-11 PROCEDURE — 7100000010 HC PHASE TWO TIME - EACH INCREMENTAL 1 MINUTE: Performed by: INTERNAL MEDICINE

## 2025-03-11 PROCEDURE — C1769 GUIDE WIRE: HCPCS | Performed by: INTERNAL MEDICINE

## 2025-03-11 RX ORDER — MORPHINE SULFATE 2 MG/ML
2 INJECTION, SOLUTION INTRAMUSCULAR; INTRAVENOUS EVERY 6 HOURS PRN
Status: DISCONTINUED | OUTPATIENT
Start: 2025-03-11 | End: 2025-03-11 | Stop reason: HOSPADM

## 2025-03-11 RX ORDER — ACETAMINOPHEN 650 MG/1
650 SUPPOSITORY RECTAL EVERY 6 HOURS PRN
Status: DISCONTINUED | OUTPATIENT
Start: 2025-03-11 | End: 2025-03-11 | Stop reason: HOSPADM

## 2025-03-11 RX ORDER — SODIUM CHLORIDE 9 MG/ML
100 INJECTION, SOLUTION INTRAVENOUS CONTINUOUS
Status: DISCONTINUED | OUTPATIENT
Start: 2025-03-11 | End: 2025-03-11

## 2025-03-11 RX ORDER — SODIUM CHLORIDE 9 MG/ML
100 INJECTION, SOLUTION INTRAVENOUS CONTINUOUS
Status: ACTIVE | OUTPATIENT
Start: 2025-03-11 | End: 2025-03-11

## 2025-03-11 RX ORDER — ACETAMINOPHEN 160 MG/5ML
650 SOLUTION ORAL EVERY 6 HOURS PRN
Status: DISCONTINUED | OUTPATIENT
Start: 2025-03-11 | End: 2025-03-11 | Stop reason: HOSPADM

## 2025-03-11 RX ORDER — HEPARIN SODIUM 1000 [USP'U]/ML
INJECTION, SOLUTION INTRAVENOUS; SUBCUTANEOUS AS NEEDED
Status: DISCONTINUED | OUTPATIENT
Start: 2025-03-11 | End: 2025-03-11 | Stop reason: HOSPADM

## 2025-03-11 RX ORDER — MIDAZOLAM HYDROCHLORIDE 1 MG/ML
INJECTION, SOLUTION INTRAMUSCULAR; INTRAVENOUS AS NEEDED
Status: DISCONTINUED | OUTPATIENT
Start: 2025-03-11 | End: 2025-03-11 | Stop reason: HOSPADM

## 2025-03-11 RX ORDER — FENTANYL CITRATE 50 UG/ML
INJECTION, SOLUTION INTRAMUSCULAR; INTRAVENOUS AS NEEDED
Status: DISCONTINUED | OUTPATIENT
Start: 2025-03-11 | End: 2025-03-11 | Stop reason: HOSPADM

## 2025-03-11 RX ORDER — SODIUM CHLORIDE 9 MG/ML
1000 INJECTION, SOLUTION INTRAVENOUS ONCE AS NEEDED
Status: DISCONTINUED | OUTPATIENT
Start: 2025-03-11 | End: 2025-03-11 | Stop reason: HOSPADM

## 2025-03-11 RX ORDER — ACETAMINOPHEN 325 MG/1
650 TABLET ORAL EVERY 6 HOURS PRN
Status: DISCONTINUED | OUTPATIENT
Start: 2025-03-11 | End: 2025-03-11 | Stop reason: HOSPADM

## 2025-03-11 RX ORDER — LIDOCAINE HYDROCHLORIDE 20 MG/ML
INJECTION, SOLUTION INFILTRATION; PERINEURAL AS NEEDED
Status: DISCONTINUED | OUTPATIENT
Start: 2025-03-11 | End: 2025-03-11 | Stop reason: HOSPADM

## 2025-03-11 RX ADMIN — SODIUM CHLORIDE 100 ML/HR: 9 INJECTION, SOLUTION INTRAVENOUS at 08:10

## 2025-03-11 ASSESSMENT — PAIN SCALES - GENERAL

## 2025-03-11 ASSESSMENT — COLUMBIA-SUICIDE SEVERITY RATING SCALE - C-SSRS
1. IN THE PAST MONTH, HAVE YOU WISHED YOU WERE DEAD OR WISHED YOU COULD GO TO SLEEP AND NOT WAKE UP?: NO
6. HAVE YOU EVER DONE ANYTHING, STARTED TO DO ANYTHING, OR PREPARED TO DO ANYTHING TO END YOUR LIFE?: NO
2. HAVE YOU ACTUALLY HAD ANY THOUGHTS OF KILLING YOURSELF?: NO

## 2025-03-11 ASSESSMENT — PAIN - FUNCTIONAL ASSESSMENT
PAIN_FUNCTIONAL_ASSESSMENT: 0-10

## 2025-03-11 NOTE — POST-PROCEDURE NOTE
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            Anticoagulation/Antiplatelet Plan:       Estimated Blood Loss:   * No values recorded between 3/11/2025  8:22 AM and 3/11/2025  9:01 AM *    Anesthesia: Moderate Sedation Anesthesia Staff: No anesthesia staff entered.    Any Specimen(s) Removed:   No specimens collected during this procedure.    Disposition:   Dc home later      Electronically signed by: Aníbal Disla MD, 3/11/2025 9:01 AM

## 2025-03-11 NOTE — Clinical Note
Closure device placed in the right radial artery. Site closed by radial compression system. 14cc air

## 2025-03-11 NOTE — PRE-SEDATION DOCUMENTATION
Cardiology Preprocedure Note    Domo Kelley   Indication for procedure: The primary encounter diagnosis was Abnormal computed tomography angiography (CTA). Diagnoses of Heart failure with improved ejection fraction (HFimpEF) and Coronary artery anomaly (HHS-HCC) were also pertinent to this visit. Patient here for [facility] r/o obstructive CAD.        BP (!) 167/91   Pulse 60   Temp 36.1 °C (97 °F)   Resp 20   SpO2 99%    Relevant Labs:   Lab Results   Component Value Date    CREATININE 0.99 03/06/2025    EGFR 78 03/06/2025    INR 1.0 05/16/2024    PROTIME 11.7 05/16/2024       Planned Sedation/Anesthesia: Moderate    Airway assessment: normal    Directed physical examination: General: Alert and Oriented, No distress, cooperative. Lungs: Clear to auscultation bilaterally, no wheezes, rhonci, or rales. respirations unlabored Heart: regular rate and rhythm, S1 and S2, no murmur, right radial pulse 2+     Mallampati: II (hard and soft palate, upper portion of tonsils and uvula visible)    ASA Score: ASA 2 - Patient with mild systemic disease with no functional limitations    Benefits, risks and alternatives of procedure and planned sedation have been discussed with the patient and/or their representative. All questions answered and they agree to proceed.     JACKLYN Farias-CNP

## 2025-03-31 DIAGNOSIS — E11.9 CONTROLLED TYPE 2 DIABETES MELLITUS WITHOUT COMPLICATION, WITHOUT LONG-TERM CURRENT USE OF INSULIN: ICD-10-CM

## 2025-03-31 RX ORDER — BLOOD-GLUCOSE SENSOR
EACH MISCELLANEOUS
Qty: 3 EACH | Refills: 11 | Status: SHIPPED | OUTPATIENT
Start: 2025-03-31

## 2025-04-10 ENCOUNTER — HOSPITAL ENCOUNTER (OUTPATIENT)
Dept: CARDIOLOGY | Facility: HOSPITAL | Age: 80
Discharge: HOME | End: 2025-04-10
Payer: MEDICARE

## 2025-04-10 DIAGNOSIS — R00.1 BRADYCARDIA: ICD-10-CM

## 2025-04-10 DIAGNOSIS — R00.1 BRADYCARDIA: Primary | ICD-10-CM

## 2025-04-10 DIAGNOSIS — I44.0 FIRST DEGREE HEART BLOCK: ICD-10-CM

## 2025-04-10 PROCEDURE — 93296 REM INTERROG EVL PM/IDS: CPT

## 2025-04-15 ENCOUNTER — INFUSION (OUTPATIENT)
Dept: HEMATOLOGY/ONCOLOGY | Facility: CLINIC | Age: 80
End: 2025-04-15
Payer: MEDICARE

## 2025-04-15 DIAGNOSIS — D45 POLYCYTHEMIA VERA: ICD-10-CM

## 2025-04-15 LAB
BASOPHILS # BLD AUTO: 0.01 X10*3/UL (ref 0–0.1)
BASOPHILS NFR BLD AUTO: 0.1 %
EOSINOPHIL # BLD AUTO: 0.18 X10*3/UL (ref 0–0.4)
EOSINOPHIL NFR BLD AUTO: 2.7 %
ERYTHROCYTE [DISTWIDTH] IN BLOOD BY AUTOMATED COUNT: 15.5 % (ref 11.5–14.5)
HCT VFR BLD AUTO: 42.5 % (ref 41–52)
HGB BLD-MCNC: 13.3 G/DL (ref 13.5–17.5)
IMM GRANULOCYTES # BLD AUTO: 0.01 X10*3/UL (ref 0–0.5)
IMM GRANULOCYTES NFR BLD AUTO: 0.1 % (ref 0–0.9)
LYMPHOCYTES # BLD AUTO: 2.35 X10*3/UL (ref 0.8–3)
LYMPHOCYTES NFR BLD AUTO: 35 %
MCH RBC QN AUTO: 23.4 PG (ref 26–34)
MCHC RBC AUTO-ENTMCNC: 31.3 G/DL (ref 32–36)
MCV RBC AUTO: 75 FL (ref 80–100)
MONOCYTES # BLD AUTO: 0.72 X10*3/UL (ref 0.05–0.8)
MONOCYTES NFR BLD AUTO: 10.7 %
NEUTROPHILS # BLD AUTO: 3.45 X10*3/UL (ref 1.6–5.5)
NEUTROPHILS NFR BLD AUTO: 51.4 %
PLATELET # BLD AUTO: 183 X10*3/UL (ref 150–450)
RBC # BLD AUTO: 5.69 X10*6/UL (ref 4.5–5.9)
WBC # BLD AUTO: 6.7 X10*3/UL (ref 4.4–11.3)

## 2025-04-15 PROCEDURE — 85025 COMPLETE CBC W/AUTO DIFF WBC: CPT

## 2025-04-15 PROCEDURE — 36415 COLL VENOUS BLD VENIPUNCTURE: CPT

## 2025-06-03 ENCOUNTER — INFUSION (OUTPATIENT)
Dept: HEMATOLOGY/ONCOLOGY | Facility: CLINIC | Age: 80
End: 2025-06-03
Payer: MEDICARE

## 2025-06-03 ENCOUNTER — OFFICE VISIT (OUTPATIENT)
Dept: HEMATOLOGY/ONCOLOGY | Facility: CLINIC | Age: 80
End: 2025-06-03
Payer: MEDICARE

## 2025-06-03 VITALS
TEMPERATURE: 97.9 F | DIASTOLIC BLOOD PRESSURE: 81 MMHG | RESPIRATION RATE: 16 BRPM | HEART RATE: 75 BPM | SYSTOLIC BLOOD PRESSURE: 145 MMHG | WEIGHT: 203.93 LBS | BODY MASS INDEX: 27.47 KG/M2 | OXYGEN SATURATION: 95 %

## 2025-06-03 DIAGNOSIS — D45 POLYCYTHEMIA VERA: ICD-10-CM

## 2025-06-03 LAB
ALBUMIN SERPL BCP-MCNC: 4.5 G/DL (ref 3.4–5)
ALP SERPL-CCNC: 55 U/L (ref 33–136)
ALT SERPL W P-5'-P-CCNC: 17 U/L (ref 10–52)
ANION GAP SERPL CALC-SCNC: 12 MMOL/L (ref 10–20)
AST SERPL W P-5'-P-CCNC: 17 U/L (ref 9–39)
BASOPHILS # BLD AUTO: 0.01 X10*3/UL (ref 0–0.1)
BASOPHILS NFR BLD AUTO: 0.1 %
BILIRUB SERPL-MCNC: 0.9 MG/DL (ref 0–1.2)
BUN SERPL-MCNC: 23 MG/DL (ref 6–23)
CALCIUM SERPL-MCNC: 9.7 MG/DL (ref 8.6–10.3)
CHLORIDE SERPL-SCNC: 99 MMOL/L (ref 98–107)
CO2 SERPL-SCNC: 31 MMOL/L (ref 21–32)
CREAT SERPL-MCNC: 1.07 MG/DL (ref 0.5–1.3)
EGFRCR SERPLBLD CKD-EPI 2021: 71 ML/MIN/1.73M*2
EOSINOPHIL # BLD AUTO: 0.08 X10*3/UL (ref 0–0.4)
EOSINOPHIL NFR BLD AUTO: 1.1 %
ERYTHROCYTE [DISTWIDTH] IN BLOOD BY AUTOMATED COUNT: 16.4 % (ref 11.5–14.5)
EST. AVERAGE GLUCOSE BLD GHB EST-MCNC: 169 MG/DL
GLUCOSE SERPL-MCNC: 156 MG/DL (ref 74–99)
HBA1C MFR BLD: 7.5 % (ref ?–5.7)
HCT VFR BLD AUTO: 43.7 % (ref 41–52)
HGB BLD-MCNC: 14 G/DL (ref 13.5–17.5)
IMM GRANULOCYTES # BLD AUTO: 0.01 X10*3/UL (ref 0–0.5)
IMM GRANULOCYTES NFR BLD AUTO: 0.1 % (ref 0–0.9)
LYMPHOCYTES # BLD AUTO: 2.55 X10*3/UL (ref 0.8–3)
LYMPHOCYTES NFR BLD AUTO: 34.7 %
MCH RBC QN AUTO: 24.6 PG (ref 26–34)
MCHC RBC AUTO-ENTMCNC: 32 G/DL (ref 32–36)
MCV RBC AUTO: 77 FL (ref 80–100)
MONOCYTES # BLD AUTO: 0.68 X10*3/UL (ref 0.05–0.8)
MONOCYTES NFR BLD AUTO: 9.3 %
NEUTROPHILS # BLD AUTO: 4.02 X10*3/UL (ref 1.6–5.5)
NEUTROPHILS NFR BLD AUTO: 54.7 %
PLATELET # BLD AUTO: 185 X10*3/UL (ref 150–450)
POTASSIUM SERPL-SCNC: 3.6 MMOL/L (ref 3.5–5.3)
PROT SERPL-MCNC: 7.1 G/DL (ref 6.4–8.2)
RBC # BLD AUTO: 5.69 X10*6/UL (ref 4.5–5.9)
SODIUM SERPL-SCNC: 138 MMOL/L (ref 136–145)
WBC # BLD AUTO: 7.4 X10*3/UL (ref 4.4–11.3)

## 2025-06-03 PROCEDURE — 82040 ASSAY OF SERUM ALBUMIN: CPT | Performed by: INTERNAL MEDICINE

## 2025-06-03 PROCEDURE — 99213 OFFICE O/P EST LOW 20 MIN: CPT | Performed by: INTERNAL MEDICINE

## 2025-06-03 PROCEDURE — 36415 COLL VENOUS BLD VENIPUNCTURE: CPT | Performed by: INTERNAL MEDICINE

## 2025-06-03 PROCEDURE — 1159F MED LIST DOCD IN RCRD: CPT | Performed by: INTERNAL MEDICINE

## 2025-06-03 PROCEDURE — 1160F RVW MEDS BY RX/DR IN RCRD: CPT | Performed by: INTERNAL MEDICINE

## 2025-06-03 PROCEDURE — 1126F AMNT PAIN NOTED NONE PRSNT: CPT | Performed by: INTERNAL MEDICINE

## 2025-06-03 PROCEDURE — 3077F SYST BP >= 140 MM HG: CPT | Performed by: INTERNAL MEDICINE

## 2025-06-03 PROCEDURE — 85025 COMPLETE CBC W/AUTO DIFF WBC: CPT | Performed by: INTERNAL MEDICINE

## 2025-06-03 PROCEDURE — 3079F DIAST BP 80-89 MM HG: CPT | Performed by: INTERNAL MEDICINE

## 2025-06-03 ASSESSMENT — PAIN SCALES - GENERAL: PAINLEVEL_OUTOF10: 0-NO PAIN

## 2025-06-03 NOTE — PROGRESS NOTES
Doom Kelley  Male, 79 y.o., 1945  MRN: [id number]        Patient Visit Information:   Visit Type: Follow Up Visit   Polycythemia vera    Phlebotomy every 2 months, goal is to keep hematocrit less than 45       Chief Complaint: Polycythemia vera   Interval History:    6/3/25  Domo Kelley presents today for interval follow-up and treatment of polycythemia.     He reports he overall feels well. He has been receiving therapeutic phlebotomies every 2 months and tolerating well with good response.    Sometime patient has a headache, very  active, still working full-time, no night sweats, no fever, no chest pain, no shortness of breath, no abdominal pain, no diarrhea, no arthritis, no itching after hot showers.  Today hematocrit 43.7.     Past Medical History:  Polycythemia: Initially diagnosed May 2015 and started treatment with therapeutic phlebotomies at [facility] and transferred care closer  at [facility] [place]. Has  been receiving therapeutic phlebotomies to maintain goal hematocrit <45%.      Hypertension, Vitamin D deficiency, Diabetes Mellitus, Non-ischemic cardiomyopathy, JULIETA, AV block, CAD, History of squamous cell carcinoma of skin, Osteoarthritis.      Social History:  He grows wine grapes and turf seed.        Review of Systems:   Review of Systems:    Constitutional: Feeling well, no fatigue or weakness. No fever, chills, night sweats.  Head and neck: No dizziness. Intermittent headaches.    HEENT: No sore throat or sinusitis.  Hearing is normal eyesight is good.  Cardiac: No chest pain or palpitations.  Pulmonary: no cough or shortness of breath.  GI: Appetite is good and weight stable.  No constipation or diarrhea.  No abdominal pain  Genitourinary: No frequency or urgency.  No polyuria or dysuria.  Musculocutaneous: History of osteoarthritis.  Endocrine: No thyroid disease. History of diabetes.   Skin: No rash or itching.  Neuromuscular : no fainting or dizziness.  No  history of convulsions.  No tingling or numbness.           Allergies and Intolerances:       Allergies:         No Known Allergies: Drug, Unknown, Active     Outpatient Medication Profile:  * Patient Currently Takes Medications as of 25-Jul-2023 09:56 documented in Structured Notes         metFORMIN HCl - 1000 MG Oral Tablet : Last Dose Taken:  , TAKE 1 TABLET TWICE DAILY., Start Date: 02-Dec-2020         Chlorthalidone 25 MG Oral Tablet: Last Dose Taken:  , TAKE 1 TABLET DAILY.,  Start Date: 01-Aug-2019         losartan 25 mg oral tablet: Last Dose Taken:  , 1 tab(s) orally once  a day         Vitamin B Complex 100 injectable solution: Last Dose Taken:  , 1 cap(s)  orally once a day             Medical History:         Polycythemia vera: ICD-10: D45, Status: Active         Polycythemia vera: ICD-10: D45, Status: Active     Family History: No Family History items are recorded  in the problem list.      Social History:   Social Substance History:  ·  Smoking Status never smoker (1)   ·  Alcohol Use denies   ·  Drug Use denies            Vitals and Measurements:   Vitals: Temp: 35.6  HR: 50  RR: 16  BP: 156/75  SPO2%:   97   Measurements: HT(cm): 183  WT(kg): 93.7  BSA: 2.18   BMI:  27.9   Last 3 Weights & Heights: Date:                           Weight/Scale Type:                    Height:   25-Jul-2023 09:51                93.7  kg                     183  cm  30-May-2023 09:05                94.7  kg                     183  cm  28-Mar-2023 10:50                96.1  kg                     183  cm      Physical Exam:      Constitutional: awake/alert/oriented x3, no distress,   Eyes: PERRL, EOMI, clear sclera   ENMT: mucous membranes moist,   Head/Neck: Neck supple,  thyroid without mass or  tenderness, No JVD, trachea midline, no bruits   Respiratory/Thorax: Patent airways, CTAB, normal  breath sounds   Cardiovascular: Regular, rate and rhythm,   normal  S 1and S 2   Gastrointestinal: Nondistended, soft,  non-tender,   no masses palpable, no organomegaly, +BS,   Musculoskeletal: ROM intact, no joint swelling, normal  strength   Extremities: normal extremities, no cyanosis edema,   no clubbing   Neurological: alert and oriented x3,   Lymphatic: No significant lymphadenopathy   Psychological: Appropriate mood and behavior   Skin: Warm and dry, no lesions,         Lab Results:        4.4 - 11.3 x10*3/uL 7.4 6.7 7.7 6.8 6.6 7.2 6.7   RBC  4.50 - 5.90 x10*6/uL 5.69 5.69 5.79 5.94 High  6.14 High  6.00 High  5.82   Hemoglobin  13.5 - 17.5 g/dL 14.0 13.3 Low  13.6 13.9 14.7 14.8 14.9   Hematocrit  41.0 - 52.0 % 43.7 42.5 43.8 45.6 47.0 46.2 45.9   MCV  80 - 100 fL 77 Low  75 Low  76 Low  77 Low  77 Low  77 Low  79 Low    MCH  26.0 - 34.0 pg 24.6 Low  23.4 Low  23.5 Low  23.4 Low  23.9 Low  24.7 Low  25.6 Low    MCHC  32.0 - 36.0 g/dL 32.0 31.3 Low  31.1 Low  30.5 Low  31.3 Low  32.0 32.5   RDW  11.5 - 14.5 % 16.4 High  15.5 High  15.2 High  14.7 High  14.8 High  14.3 13.2   Platelets  150 - 450 x10*3/uL 185                      ·  Results             Assessment and Plan:      Assessment and Plan:   Assessment:    1. Polycythemia  Initially diagnosed 2015 and received treatment through [facility] with therapeutic phlebotomies. Transferred [place] closer [place] home at [facility] [place]. Current treatment- therapeutic  phlebotomies every 2 months to maintain goal hematocrit <45%. Hematocrit today- 43.7%     2. Multiple medical conditions including HTN, JULIETA, DM, non-ischemic cardiomyopathy, etc.     Plan:  No phlebotomy today, HCT 43.7, continue phlebotomy every 2 months.   follow-up visit after 2 monthsMonitor CBC, CMP, iron studies.        Total time =20 minutes. 50% or more of this time was spent in counseling and/or coordination of care including reviewing medical history/radiology/labs, examining patient, formulating outlined plan  with team, and discussing plan with patient/family.  I reviewed patient's chart including  but not limited to labs, imaging, surgical/procedure notes, pathology, hospital notes, doctor's notes.

## 2025-06-03 NOTE — PATIENT INSTRUCTIONS
Follow up visit with Dr. Sequeira for history of polycythemia.    Hematocrit 43.7 today, no phlebotomy needed.     Return in 2 months for labs, follow up and possible phlebotomy.     Call the office at [phone] with questions or needs.  You may also contact your nurse or doctor with non-urgent issues by sending a [email or web address] message.  Reminders  Medicine refills: call or send a [email or web address] message to request medicine refills at least 5 to 7 days before you run out.  Labs: You will need labs drawn at your follow-up doctor visit

## 2025-06-05 DIAGNOSIS — E11.9 CONTROLLED TYPE 2 DIABETES MELLITUS WITHOUT COMPLICATION, WITHOUT LONG-TERM CURRENT USE OF INSULIN: ICD-10-CM

## 2025-06-08 NOTE — PROGRESS NOTES
Subjective   Reason for Visit: Domo Kelley is an 79 y.o. male here for follow-up on chronic medical conditions and Medicare exam  Hemoglobin A1c     HPI  Reviewed Chronic illnesses  History of Present Illness  The patient presents for evaluation of diabetes mellitus and tick bite.    He has been monitoring his blood glucose levels using the Carmela system, which remained within the normal range for the past 3 to 4 weeks. However, he discontinued the use of Carmela due to financial constraints. He experienced a significant spike in his blood glucose levels following the consumption of chocolate chip cookies, but prior to this incident, his levels were well-controlled. He reports no issues with wound healing or symptoms such as burning, numbness, or tingling in his extremities. He also reports no chest pain, palpitations, or shortness of breath. He is currently on Trulicity, which he tolerates well, although he occasionally experiences mild stomach discomfort in the mornings, lasting approximately an hour. His bowel movements are regular, and he maintains a high-fiber diet, avoiding fried foods. He consumes fish once a week and is considering the addition of fish oil supplements to his regimen.    He recently sustained a tick bite, which has resulted in a megan on his skin. The tick was removed on 06/05/2025, but he is uncertain about the duration of its attachment. He reports no fever or upper respiratory symptoms but does report body aches, which he does not perceive as being more severe than usual.    SOCIAL HISTORY  He does not use tobacco.       Results  Labs   - Blood Sugar: 7.5     Orders Only on 06/05/2025   Component Date Value    Hemoglobin A1C 06/03/2025 7.5 (H)     Estimated Average Glucose 06/03/2025 169    Office Visit on 06/03/2025   Component Date Value    WBC 06/03/2025 7.4     RBC 06/03/2025 5.69     Hemoglobin 06/03/2025 14.0     Hematocrit 06/03/2025 43.7     MCV 06/03/2025 77 (L)     MCH  "06/03/2025 24.6 (L)     MCHC 06/03/2025 32.0     RDW 06/03/2025 16.4 (H)     Platelets 06/03/2025 185     Neutrophils % 06/03/2025 54.7     Immature Granulocytes %,* 06/03/2025 0.1     Lymphocytes % 06/03/2025 34.7     Monocytes % 06/03/2025 9.3     Eosinophils % 06/03/2025 1.1     Basophils % 06/03/2025 0.1     Neutrophils Absolute 06/03/2025 4.02     Immature Granulocytes Ab* 06/03/2025 0.01     Lymphocytes Absolute 06/03/2025 2.55     Monocytes Absolute 06/03/2025 0.68     Eosinophils Absolute 06/03/2025 0.08     Basophils Absolute 06/03/2025 0.01     Glucose 06/03/2025 156 (H)     Sodium 06/03/2025 138     Potassium 06/03/2025 3.6     Chloride 06/03/2025 99     Bicarbonate 06/03/2025 31     Anion Gap 06/03/2025 12     Urea Nitrogen 06/03/2025 23     Creatinine 06/03/2025 1.07     eGFR 06/03/2025 71     Calcium 06/03/2025 9.7     Albumin 06/03/2025 4.5     Alkaline Phosphatase 06/03/2025 55     Total Protein 06/03/2025 7.1     AST 06/03/2025 17     Bilirubin, Total 06/03/2025 0.9     ALT 06/03/2025 17       Patient Care Team:  Chris Kelsey DO as PCP - General  Chris Kelsey DO as PCP - [facility] Attributed Provider  Carlos Rios MD as Consulting Physician (Cardiology)  Dominga Sorenson MD (Dermatology)  Rasta Sequeira MD as Consulting Physician (Hematology and Oncology)     Review of Systems    Objective   Vitals:  /64 (BP Location: Left arm, Patient Position: Sitting, BP Cuff Size: Adult)   Pulse 79   Temp 36.7 °C (98 °F)   Ht 1.835 m (6' 0.25\")   Wt 90.7 kg (200 lb)   SpO2 99%   BMI 26.94 kg/m²       Physical Exam  General: Patient is alert and oriented ×3 and appears in no acute distress. No respiratory distress.    Head: Atraumatic normocephalic.    Eyes: EOMI, PERRLA      Ears: Canals patent without any irritation, tympanic membranes without inflammation, no swelling, normal light reflex.    Nose: Nares patent. Turbinates are swollen. No discharge.    Mouth: Normal mucosa. Moist. No erythema, " exudates, tonsillar enlargement.    Neck: Normal range of motion, no masses.  Thyroid is palpable and normal in size without any nodules. No anterior cervical or posterior cervical adenopathy.    Heart: Regular rate and rhythm, no murmurs clicks or gallops    Lungs: Clear to auscultation bilaterally without any rhonchi rales or wheezing, lung sounds heard throughout all lung fields    Abdomen: Soft, nontender, no rigidity, rebound, guarding or organomegaly. Bowel sounds ×4 quadrants.    Musculoskeletal: Normal range of motion, strength is grossly intact in the proximal distal muscles of the upper and lower extremities bilaterally, deep tendon reflexes +2 out of 4 and symmetric bilaterally at the patella, Achilles, biceps, triceps, sensation intact.    Nerves: Cranial nerves II through XII appear grossly intact and without deficit    Skin: Right abdomen there is a bull's-eye rash where he had pulled off his tick.    Psych: Normal affect.  Assessment/Plan   Problem List Items Addressed This Visit       Controlled type 2 diabetes mellitus without complication, without long-term current use of insulin - Primary    Relevant Orders    Albumin-Creatinine Ratio, Urine Random    CBC and Auto Differential    Comprehensive Metabolic Panel    Vitamin B12     Other Visit Diagnoses         Tick bite of abdomen, initial encounter        Relevant Medications    doxycycline (Vibramycin) 100 mg capsule    Other Relevant Orders    LYME (B. BURGDORFERI) AB MODIFIED 2-TITER TESTING, WITH REFLEX TO IGM AND IGG BY AI      Type 2 diabetes mellitus without complication, without long-term current use of insulin        Relevant Orders    Hemoglobin A1C          Assessment & Plan  1. Diabetes Mellitus.  - Blood glucose levels have shown improvement, with a decrease from 7.8 to 7.5, although not yet within the desired range.  - Currently on Trulicity and tolerating it well, with occasional stomach uneasiness that is manageable.  - Advised to  continue with current medication regimen and dietary modifications, including smaller, more frequent meals and avoiding fried and fatty foods. Teena tea recommended to help with stomach uneasiness.  - Laboratory tests ordered to be completed prior to the next visit.    2. Tick Bite.  - Presented with a bull's eye rash, indicative of a tick bite.  - Doxycycline prescribed for a duration of 14 to 21 days.  - Informed about the potential side effect of photosensitivity associated with this medication and advised to wear long sleeves and a hat when outdoors.  - Lyme disease test to be conducted in approximately 6 weeks.    Follow-up  The patient will follow up in 6 months.       1. Polycythemia   Initially diagnosed 2015 and received treatment through [facility]   with therapeutic phlebotomies. [place] closer [place] at [facility] [place].   Current treatment- therapeutic phlebotomies every 2 months to maintain goal   hematocrit <45%. Hematocrit today- 47.8%      diabetes type 2  - Controlled with HBA1c 7.5 6/3/25  -metformin ER 1000 mg twice daily.   - Trulicity 3 mg weekly  -Patient followed up with [facility] December 2023 and told to get his Eye exam  -Mediteranian diet suggested  - Microalbumin done 10/30/2023 normal  - exercise at least 15 to 20 minutes a day 4 days a week  - Atorvastatin 10 mg daily   - Freestyle Carmela  -Repeat labs in 3 months    Essential hypertension  -Controlled at 118/64    Obstructive sleep apnea  -Resolved    Vitamin D deficiency    Polycythemia vera  -Receiving phlebotomy every 2 months. Dr. Moctezuma hematologist. DR CHRISTOPHER following    Noniscemic Cardiomyopathy/aortic root dilation/chronic systolic heart failure  Pace maker  Aortic root dilation  Heart Cath 3/11/25  02/27/2025 - CTA Coronary Arteries  1. Right dominant system.  2. Large dominant tortuous right coronary artery which gives off large PDA and PLV branches which supplies the inferior and lateral walls  including the left AV groove territory.  3. The left circumflex is absent.  4. High-grade eccentric soft plaque in the distal RCA with estimated luminal stenosis of at least 50%. Unfortunately heart flow was not available for current study.  5. Eccentric calcified atherosclerosis in the proximal LAD with estimated luminal stenosis of less than 30%.  6. 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  7. Minimal atherosclerosis involving the remaining coronary arterial territories.     02/06/2025 - TTE  1. The left ventricular systolic function is normal, with a Sepulveda's biplane calculated ejection fraction of 54%.  2. No regional wall motion abnormalities.  3. Spectral Doppler shows a Grade I (impaired relaxation pattern) of left ventricular diastolic filling with normal left atrial filling pressure.  4. There is normal right ventricular global systolic function.  5. Aortic valve stenosis is not present.  6. Left Ventricular Global Longitudinal Strain - 15.7 %.  7. Mild dilatation of the aortic root.        Squamous cell carcinoma- Followed by Dr Sorenson Dermatology    Allergic rhinitis  - Flonase 1 spray per nostril after using salt water nasal rinses

## 2025-06-09 ENCOUNTER — APPOINTMENT (OUTPATIENT)
Dept: PRIMARY CARE | Facility: CLINIC | Age: 80
End: 2025-06-09
Payer: MEDICARE

## 2025-06-09 VITALS
DIASTOLIC BLOOD PRESSURE: 64 MMHG | HEART RATE: 79 BPM | HEIGHT: 72 IN | WEIGHT: 200 LBS | SYSTOLIC BLOOD PRESSURE: 126 MMHG | BODY MASS INDEX: 27.09 KG/M2 | OXYGEN SATURATION: 99 % | TEMPERATURE: 98 F

## 2025-06-09 DIAGNOSIS — E11.9 CONTROLLED TYPE 2 DIABETES MELLITUS WITHOUT COMPLICATION, WITHOUT LONG-TERM CURRENT USE OF INSULIN: Primary | ICD-10-CM

## 2025-06-09 DIAGNOSIS — S30.861A TICK BITE OF ABDOMEN, INITIAL ENCOUNTER: ICD-10-CM

## 2025-06-09 DIAGNOSIS — E11.9 TYPE 2 DIABETES MELLITUS WITHOUT COMPLICATION, WITHOUT LONG-TERM CURRENT USE OF INSULIN: ICD-10-CM
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
## 2025-06-09 PROCEDURE — G2211 COMPLEX E/M VISIT ADD ON: HCPCS | Performed by: FAMILY MEDICINE

## 2025-06-09 PROCEDURE — 1160F RVW MEDS BY RX/DR IN RCRD: CPT | Performed by: FAMILY MEDICINE

## 2025-06-09 PROCEDURE — 3074F SYST BP LT 130 MM HG: CPT | Performed by: FAMILY MEDICINE

## 2025-06-09 PROCEDURE — 1159F MED LIST DOCD IN RCRD: CPT | Performed by: FAMILY MEDICINE

## 2025-06-09 PROCEDURE — 3078F DIAST BP <80 MM HG: CPT | Performed by: FAMILY MEDICINE

## 2025-06-09 PROCEDURE — 99214 OFFICE O/P EST MOD 30 MIN: CPT | Performed by: FAMILY MEDICINE

## 2025-06-09 PROCEDURE — 1036F TOBACCO NON-USER: CPT | Performed by: FAMILY MEDICINE

## 2025-06-09 RX ORDER — DOXYCYCLINE 100 MG/1
100 CAPSULE ORAL 2 TIMES DAILY
Qty: 28 CAPSULE | Refills: 0 | Status: SHIPPED | OUTPATIENT
Start: 2025-06-09 | End: 2025-06-23

## 2025-06-24 ENCOUNTER — APPOINTMENT (OUTPATIENT)
Dept: HEMATOLOGY/ONCOLOGY | Facility: CLINIC | Age: 80
End: 2025-06-24
Payer: MEDICARE

## 2025-07-08 ENCOUNTER — HOSPITAL ENCOUNTER (OUTPATIENT)
Dept: CARDIOLOGY | Facility: HOSPITAL | Age: 80
Discharge: HOME | End: 2025-07-08
Payer: MEDICARE

## 2025-07-08 DIAGNOSIS — I44.0 FIRST DEGREE HEART BLOCK: ICD-10-CM

## 2025-07-08 DIAGNOSIS — R00.1 BRADYCARDIA: ICD-10-CM

## 2025-07-09 ENCOUNTER — HOSPITAL ENCOUNTER (OUTPATIENT)
Dept: CARDIOLOGY | Facility: HOSPITAL | Age: 80
Discharge: HOME | End: 2025-07-09
Payer: MEDICARE

## 2025-07-09 DIAGNOSIS — S30.861A TICK BITE OF ABDOMEN, INITIAL ENCOUNTER: ICD-10-CM

## 2025-07-09 DIAGNOSIS — I44.0 FIRST DEGREE HEART BLOCK: ICD-10-CM
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
## 2025-07-09 DIAGNOSIS — R00.1 BRADYCARDIA: ICD-10-CM

## 2025-07-09 PROCEDURE — 93280 PM DEVICE PROGR EVAL DUAL: CPT

## 2025-07-14 LAB — B BURGDOR IGG+IGM SER QL IA: <=0.9 INDEX

## 2025-07-22 ENCOUNTER — APPOINTMENT (OUTPATIENT)
Dept: HEMATOLOGY/ONCOLOGY | Facility: CLINIC | Age: 80
End: 2025-07-22
Payer: MEDICARE

## 2025-08-05 ENCOUNTER — OFFICE VISIT (OUTPATIENT)
Dept: HEMATOLOGY/ONCOLOGY | Facility: CLINIC | Age: 80
End: 2025-08-05
Payer: MEDICARE

## 2025-08-05 ENCOUNTER — APPOINTMENT (OUTPATIENT)
Dept: HEMATOLOGY/ONCOLOGY | Facility: CLINIC | Age: 80
End: 2025-08-05
Payer: MEDICARE

## 2025-08-05 VITALS
HEIGHT: 72 IN | HEART RATE: 66 BPM | WEIGHT: 203.37 LBS | BODY MASS INDEX: 27.55 KG/M2 | RESPIRATION RATE: 16 BRPM | SYSTOLIC BLOOD PRESSURE: 117 MMHG | DIASTOLIC BLOOD PRESSURE: 69 MMHG | TEMPERATURE: 97.9 F

## 2025-08-05 DIAGNOSIS — D45 POLYCYTHEMIA VERA: Primary | ICD-10-CM

## 2025-08-05 LAB
BASOPHILS # BLD AUTO: 0.01 X10*3/UL (ref 0–0.1)
BASOPHILS NFR BLD AUTO: 0.1 %
EOSINOPHIL # BLD AUTO: 0.16 X10*3/UL (ref 0–0.4)
EOSINOPHIL NFR BLD AUTO: 2.1 %
ERYTHROCYTE [DISTWIDTH] IN BLOOD BY AUTOMATED COUNT: 14.9 % (ref 11.5–14.5)
HCT VFR BLD AUTO: 42.9 % (ref 41–52)
HGB BLD-MCNC: 14.3 G/DL (ref 13.5–17.5)
IMM GRANULOCYTES # BLD AUTO: 0.01 X10*3/UL (ref 0–0.5)
IMM GRANULOCYTES NFR BLD AUTO: 0.1 % (ref 0–0.9)
LYMPHOCYTES # BLD AUTO: 2.49 X10*3/UL (ref 0.8–3)
LYMPHOCYTES NFR BLD AUTO: 32.2 %
MCH RBC QN AUTO: 26.3 PG (ref 26–34)
MCHC RBC AUTO-ENTMCNC: 33.3 G/DL (ref 32–36)
MCV RBC AUTO: 79 FL (ref 80–100)
MONOCYTES # BLD AUTO: 0.76 X10*3/UL (ref 0.05–0.8)
MONOCYTES NFR BLD AUTO: 9.8 %
NEUTROPHILS # BLD AUTO: 4.31 X10*3/UL (ref 1.6–5.5)
NEUTROPHILS NFR BLD AUTO: 55.7 %
NRBC BLD-RTO: ABNORMAL /100{WBCS}
PLATELET # BLD AUTO: 182 X10*3/UL (ref 150–450)
RBC # BLD AUTO: 5.44 X10*6/UL (ref 4.5–5.9)
WBC # BLD AUTO: 7.7 X10*3/UL (ref 4.4–11.3)

## 2025-08-05 PROCEDURE — 3074F SYST BP LT 130 MM HG: CPT | Performed by: INTERNAL MEDICINE

## 2025-08-05 PROCEDURE — 99214 OFFICE O/P EST MOD 30 MIN: CPT | Performed by: INTERNAL MEDICINE

## 2025-08-05 PROCEDURE — 1126F AMNT PAIN NOTED NONE PRSNT: CPT | Performed by: INTERNAL MEDICINE

## 2025-08-05 PROCEDURE — 3078F DIAST BP <80 MM HG: CPT | Performed by: INTERNAL MEDICINE

## 2025-08-05 PROCEDURE — 36415 COLL VENOUS BLD VENIPUNCTURE: CPT | Performed by: INTERNAL MEDICINE

## 2025-08-05 PROCEDURE — 1160F RVW MEDS BY RX/DR IN RCRD: CPT | Performed by: INTERNAL MEDICINE

## 2025-08-05 PROCEDURE — 1159F MED LIST DOCD IN RCRD: CPT | Performed by: INTERNAL MEDICINE

## 2025-08-05 PROCEDURE — 85025 COMPLETE CBC W/AUTO DIFF WBC: CPT | Performed by: INTERNAL MEDICINE

## 2025-08-05 ASSESSMENT — PAIN SCALES - GENERAL: PAINLEVEL_OUTOF10: 0-NO PAIN

## 2025-08-05 NOTE — PROGRESS NOTES
Domo Kelley  Male, 79 y.o., 1945  MRN: [id number]        Patient Visit Information:   Visit Type: Follow Up Visit   Polycythemia vera    Phlebotomy every 2 months, goal is to keep hematocrit less than 45       Chief Complaint: Polycythemia vera   Interval History:    8/5/25  Domo Kelley presents today for interval follow-up and treatment of polycythemia.     He reports he overall feels well. He has been receiving therapeutic phlebotomies every 2 months and tolerating well with good response.    Sometime patient has a headache, very  active, still working full-time, no night sweats, no fever, no chest pain, no shortness of breath, no abdominal pain, no diarrhea, no arthritis, no itching after hot showers.  Today hematocrit 42.9, hemoglobin 14.3  Patient is feeling some weakness and fatigue and joint pain.  No chest pain no shortness of breath no headache  Past Medical History:  Polycythemia: Initially diagnosed May 2015 and started treatment with therapeutic phlebotomies at [facility] and [place] [facility] [place]. Has  been receiving therapeutic phlebotomies to maintain goal hematocrit <45%.    5/11/25 , bone marrow biopsy did show mildly hypercellular bone marrow with 88 anemia hematopoiesis  JAK2 mutation negative, FISH for BCR-ABL negative        Hypertension, Vitamin D deficiency, Diabetes Mellitus, Non-ischemic cardiomyopathy, JULIETA, AV block, CAD, History of squamous cell carcinoma of skin, Osteoarthritis.      Social History:  He grows wine grapes and turf seed.        Review of Systems:   Review of Systems:    Constitutional: Feeling well, no fatigue or weakness. No fever, chills, night sweats.  Head and neck: No dizziness. Intermittent headaches.    HEENT: No sore throat or sinusitis.  Hearing is normal eyesight is good.  Cardiac: No chest pain or palpitations.  Pulmonary: no cough or shortness of breath.  GI: Appetite is good and weight stable.  No  constipation or diarrhea.  No abdominal pain  Genitourinary: No frequency or urgency.  No polyuria or dysuria.  Musculocutaneous: History of osteoarthritis.  Endocrine: No thyroid disease. History of diabetes.   Skin: No rash or itching.  Neuromuscular : no fainting or dizziness.  No history of convulsions.  No tingling or numbness.           Allergies and Intolerances:       Allergies:         No Known Allergies: Drug, Unknown, Active     Outpatient Medication Profile:  * Patient Currently Takes Medications as of 25-Jul-2023 09:56 documented in Structured Notes         metFORMIN HCl - 1000 MG Oral Tablet : Last Dose Taken:  , TAKE 1 TABLET TWICE DAILY., Start Date: 02-Dec-2020         Chlorthalidone 25 MG Oral Tablet: Last Dose Taken:  , TAKE 1 TABLET DAILY.,  Start Date: 01-Aug-2019         losartan 25 mg oral tablet: Last Dose Taken:  , 1 tab(s) orally once  a day         Vitamin B Complex 100 injectable solution: Last Dose Taken:  , 1 cap(s)  orally once a day             Medical History:         Polycythemia vera: ICD-10: D45, Status: Active         Polycythemia vera: ICD-10: D45, Status: Active     Family History: No Family History items are recorded  in the problem list.      Social History:   Social Substance History:  ·  Smoking Status never smoker (1)   ·  Alcohol Use denies   ·  Drug Use denies            Vitals and Measurements:   Vitals: Temp: 35.6  HR: 50  RR: 16  BP: 156/75  SPO2%:   97   Measurements: HT(cm): 183  WT(kg): 93.7  BSA: 2.18   BMI:  27.9   Last 3 Weights & Heights: Date:                           Weight/Scale Type:                    Height:   25-Jul-2023 09:51                93.7  kg                     183  cm  30-May-2023 09:05                94.7  kg                     183  cm  28-Mar-2023 10:50                96.1  kg                     183  cm      Physical Exam:      Constitutional: awake/alert/oriented x3, no distress,   Eyes: PERRL, EOMI, clear sclera   ENMT: mucous membranes  moist,   Head/Neck: Neck supple,  thyroid without mass or  tenderness, No JVD, trachea midline, no bruits   Respiratory/Thorax: Patent airways, CTAB, normal  breath sounds   Cardiovascular: Regular, rate and rhythm,   normal  S 1and S 2   Gastrointestinal: Nondistended, soft, non-tender,   no masses palpable, no organomegaly, +BS,   Musculoskeletal: ROM intact, no joint swelling, normal  strength   Extremities: normal extremities, no cyanosis edema,   no clubbing   Neurological: alert and oriented x3,   Lymphatic: No significant lymphadenopathy   Psychological: Appropriate mood and behavior   Skin: Warm and dry, no lesions,         Lab Results:      /25) 2 mo ago  (6/3/25) 3 mo ago  (4/15/25) 5 mo ago  (3/6/25) 5 mo ago  (2/25/25) 6 mo ago  (1/14/25) 8 mo ago  (11/27/24)    WBC  4.4 - 11.3 x10*3/uL 7.7 7.4 6.7 7.7 6.8 6.6 7.2   nRBC    0.0 R 0.0 R     Comment: Not Measured   RBC  4.50 - 5.90 x10*6/uL 5.44 5.69 5.69 5.79 5.94 High  6.14 High  6.00 High    Hemoglobin  13.5 - 17.5 g/dL 14.3 14.0 13.3 Low  13.6 13.9 14.7 14.8   Hematocrit  41.0 - 52.0 % 42.9 43.7 42.5 43.8 45.6 47.0 46.2   MCV  80 - 100 fL 79 Low  77 Low  75 Low  76 Low  77 Low  77 Low  77 Low    MCH  26.0 - 34.0 pg 26.3 24.6 Low  23.4 Low  23.5 Low  23.4 Low  23.9 Low  24.7 Low    MCHC  32.0 - 36.0 g/dL 33.3 32.0 31.3 Low  31.1 Low  30.5 Low  31.3 Low  32.0   RDW  11.5 - 14.5 % 14.9 High  16.4 High  15.5 High  15.2 High  14.7 High  14.8 High  14.3   Platelets  150 - 450 x10*3/uL 182                      ·  Results             Assessment and Plan:      Assessment and Plan:   Assessment:    1. Polycythemia  Initially diagnosed 2015 and received treatment through [facility] with therapeutic phlebotomies. [place] [facility] [place] treatment- therapeutic  phlebotomies every 2 months to maintain goal hematocrit <45%. Hematocrit today- 43.7%     2. Multiple medical conditions including HTN, JULIETA, DM, non-ischemic  cardiomyopathy, etc.     Plan:  No phlebotomy today, HCT 42.9, continue phlebotomy every 2 months.   follow-up visit after 2 months .Monitor CBC, CMP, iron studies.  Will check cytogenetic FANG  exon 2        Total time =20 minutes. 50% or more of this time was spent in counseling and/or coordination of care including reviewing medical history/radiology/labs, examining patient, formulating outlined plan  with team, and discussing plan with patient/family.  I reviewed patient's chart including but not limited to labs, imaging, surgical/procedure notes, pathology, hospital notes, doctor's notes.

## 2025-08-05 NOTE — PATIENT INSTRUCTIONS
Follow up visit for history of polycythemia.     Hematocrit 42.9     No phlebotomy today.     Return in 8 weeks for labs, follow up with Dr. Sequeira and possible phlebotomy.     Call the office at [phone] with questions or needs.  You may also contact your nurse or doctor with non-urgent issues by sending a [email or web address] message.  Reminders  Medicine refills: call or send a [email or web address] message to request medicine refills at least 5 to 7 days before you run out.  Labs: You will need labs drawn at your follow-up doctor visit

## 2025-08-22 ENCOUNTER — TELEPHONE (OUTPATIENT)
Dept: PRIMARY CARE | Facility: CLINIC | Age: 80
End: 2025-08-22
Payer: MEDICARE

## 2025-08-22 DIAGNOSIS — E11.9 TYPE 2 DIABETES MELLITUS WITHOUT COMPLICATIONS: ICD-10-CM

## 2025-08-22 RX ORDER — METFORMIN HYDROCHLORIDE 500 MG/1
1000 TABLET, EXTENDED RELEASE ORAL 2 TIMES DAILY
Qty: 360 TABLET | Refills: 3 | Status: SHIPPED | OUTPATIENT
Start: 2025-08-22

## 2025-08-28 DIAGNOSIS — I10 ESSENTIAL (PRIMARY) HYPERTENSION: ICD-10-CM

## 2025-08-28 RX ORDER — LOSARTAN POTASSIUM 25 MG/1
25 TABLET ORAL DAILY
Qty: 90 TABLET | Refills: 3 | Status: SHIPPED | OUTPATIENT
Start: 2025-08-28

## 2025-08-28 RX ORDER — CHLORTHALIDONE 25 MG/1
25 TABLET ORAL DAILY
Qty: 90 TABLET | Refills: 3 | Status: SHIPPED | OUTPATIENT
Start: 2025-08-28

## 2025-12-09 ENCOUNTER — APPOINTMENT (OUTPATIENT)
Dept: PRIMARY CARE | Facility: CLINIC | Age: 80
End: 2025-12-09
Payer: MEDICARE

## 2026-01-19 ENCOUNTER — APPOINTMENT (OUTPATIENT)
Dept: AUDIOLOGY | Facility: HOSPITAL | Age: 81
End: 2026-01-19

## (undated) DEVICE — CATHETER, DIAGNOSTIC, DXTERITY, 6FR 100CM, JL35

## (undated) DEVICE — INTRODUCER SYSTEM, PRELUDE SNAP, SPLITTABLE, HEMOSTATIC, 7FR

## (undated) DEVICE — GUIDEWIRE, HI-TORQUE, VERSACORE, 145CM, MODIFIED J

## (undated) DEVICE — TR BAND, RADIAL COMPRESSION, STANDARD, 24CM

## (undated) DEVICE — SHEATH, GLIDESHEATH, SLENDER, 6FR 10CM

## (undated) DEVICE — CATHETER, OPTITORQUE, 6FR, JACKY, BL3.5/2H/100CM

## (undated) DEVICE — GUIDEWIRE, INQUIRE, J TIP, .035 X 210CM, FIXED CORE, DIAGNOSTIC

## (undated) DEVICE — ACCESS KIT, S-MAK MINI, 4FR 10CM 0.018IN 40CM, NT/PT, ECHO ENHANCE NEEDLE

## (undated) DEVICE — CATHETER, DIAGNOSTIC, DXTERITY, 6FR JR 4.0, 100CM